# Patient Record
Sex: MALE | URBAN - METROPOLITAN AREA
[De-identification: names, ages, dates, MRNs, and addresses within clinical notes are randomized per-mention and may not be internally consistent; named-entity substitution may affect disease eponyms.]

---

## 2020-06-21 ENCOUNTER — APPOINTMENT (OUTPATIENT)
Dept: GENERAL RADIOLOGY | Age: 60
DRG: 871 | End: 2020-06-21
Payer: MEDICARE

## 2020-06-21 ENCOUNTER — HOSPITAL ENCOUNTER (INPATIENT)
Age: 60
LOS: 10 days | Discharge: NURSING, REHAB OR LONG-TERM CARE | DRG: 871 | End: 2020-07-01
Attending: EMERGENCY MEDICINE | Admitting: INTERNAL MEDICINE
Payer: MEDICARE

## 2020-06-21 PROBLEM — R65.20 SEVERE SEPSIS (HCC): Status: ACTIVE | Noted: 2020-06-21

## 2020-06-21 PROBLEM — A41.9 SEVERE SEPSIS (HCC): Status: ACTIVE | Noted: 2020-06-21

## 2020-06-21 LAB
A/G RATIO: 1.5 (ref 1.1–2.2)
ALBUMIN SERPL-MCNC: 3.5 G/DL (ref 3.4–5)
ALP BLD-CCNC: 73 U/L (ref 40–129)
ALT SERPL-CCNC: 44 U/L (ref 10–40)
AMORPHOUS: ABNORMAL /HPF
AMORPHOUS: ABNORMAL /HPF
ANION GAP SERPL CALCULATED.3IONS-SCNC: 12 MMOL/L (ref 3–16)
ANISOCYTOSIS: ABNORMAL
APTT: 41.4 SEC (ref 24.2–36.2)
AST SERPL-CCNC: 72 U/L (ref 15–37)
BACTERIA: ABNORMAL /HPF
BACTERIA: ABNORMAL /HPF
BANDED NEUTROPHILS RELATIVE PERCENT: 7 % (ref 0–7)
BASE EXCESS ARTERIAL: 0.2 MMOL/L (ref -3–3)
BASE EXCESS VENOUS: 2.2 MMOL/L (ref -2–3)
BASOPHILS ABSOLUTE: 0 K/UL (ref 0–0.2)
BASOPHILS RELATIVE PERCENT: 0 %
BILIRUB SERPL-MCNC: 1.7 MG/DL (ref 0–1)
BILIRUBIN URINE: ABNORMAL
BILIRUBIN URINE: NEGATIVE
BLOOD, URINE: ABNORMAL
BLOOD, URINE: ABNORMAL
BUN BLDV-MCNC: 32 MG/DL (ref 7–20)
CALCIUM SERPL-MCNC: 9.1 MG/DL (ref 8.3–10.6)
CARBOXYHEMOGLOBIN ARTERIAL: 4.1 % (ref 0–1.5)
CARBOXYHEMOGLOBIN: 4.9 % (ref 0–1.5)
CHLORIDE BLD-SCNC: 100 MMOL/L (ref 99–110)
CLARITY: ABNORMAL
CLARITY: CLEAR
CO2: 24 MMOL/L (ref 21–32)
COLOR: YELLOW
COLOR: YELLOW
CREAT SERPL-MCNC: 1.3 MG/DL (ref 0.9–1.3)
D DIMER: <200 NG/ML DDU (ref 0–229)
EOSINOPHILS ABSOLUTE: 0 K/UL (ref 0–0.6)
EOSINOPHILS RELATIVE PERCENT: 0 %
EPITHELIAL CELLS, UA: ABNORMAL /HPF (ref 0–5)
EPITHELIAL CELLS, UA: ABNORMAL /HPF (ref 0–5)
GFR AFRICAN AMERICAN: >60
GFR NON-AFRICAN AMERICAN: 57
GLOBULIN: 2.4 G/DL
GLUCOSE BLD-MCNC: 149 MG/DL (ref 70–99)
GLUCOSE URINE: NEGATIVE MG/DL
GLUCOSE URINE: NEGATIVE MG/DL
HCO3 ARTERIAL: 24 MMOL/L (ref 21–29)
HCO3 VENOUS: 25.6 MMOL/L (ref 24–28)
HCT VFR BLD CALC: 29.2 % (ref 40.5–52.5)
HEMOGLOBIN, ART, EXTENDED: 7.6 G/DL
HEMOGLOBIN, VEN, REDUCED: 4.8 %
HEMOGLOBIN: 10 G/DL (ref 13.5–17.5)
INR BLD: 3.44 (ref 0.86–1.14)
KETONES, URINE: NEGATIVE MG/DL
KETONES, URINE: NEGATIVE MG/DL
LACTIC ACID: 2.7 MMOL/L (ref 0.4–2)
LACTIC ACID: 4.3 MMOL/L (ref 0.4–2)
LEUKOCYTE ESTERASE, URINE: ABNORMAL
LEUKOCYTE ESTERASE, URINE: NEGATIVE
LYMPHOCYTES ABSOLUTE: 0.3 K/UL (ref 1–5.1)
LYMPHOCYTES RELATIVE PERCENT: 3 %
MCH RBC QN AUTO: 32.1 PG (ref 26–34)
MCHC RBC AUTO-ENTMCNC: 34.3 G/DL (ref 31–36)
MCV RBC AUTO: 93.4 FL (ref 80–100)
METAMYELOCYTES RELATIVE PERCENT: 6 %
METHEMOGLOBIN ARTERIAL: 0.5 % (ref 0–1.4)
METHEMOGLOBIN VENOUS: 0.2 % (ref 0–1.5)
MICROCYTES: ABNORMAL
MICROSCOPIC EXAMINATION: YES
MICROSCOPIC EXAMINATION: YES
MONOCYTES ABSOLUTE: 0.2 K/UL (ref 0–1.3)
MONOCYTES RELATIVE PERCENT: 2 %
MUCUS: ABNORMAL /LPF
NEUTROPHILS ABSOLUTE: 8.6 K/UL (ref 1.7–7.7)
NEUTROPHILS RELATIVE PERCENT: 82 %
NITRITE, URINE: NEGATIVE
NITRITE, URINE: NEGATIVE
O2 SAT, ARTERIAL: 95 % (ref 93–100)
O2 SAT, VEN: 95 %
PCO2 ARTERIAL: 35.5 MMHG (ref 35–45)
PCO2, VEN: 37.1 MMHG (ref 41–51)
PDW BLD-RTO: 21.7 % (ref 12.4–15.4)
PH ARTERIAL: 7.44 (ref 7.35–7.45)
PH UA: 6 (ref 5–8)
PH UA: 7 (ref 5–8)
PH VENOUS: 7.45 (ref 7.35–7.45)
PLATELET # BLD: 263 K/UL (ref 135–450)
PMV BLD AUTO: 7.7 FL (ref 5–10.5)
PO2 ARTERIAL: 69.6 MMHG (ref 75–108)
PO2, VEN: 68.7 MMHG (ref 25–40)
POLYCHROMASIA: ABNORMAL
POTASSIUM REFLEX MAGNESIUM: 4.1 MMOL/L (ref 3.5–5.1)
PRO-BNP: 4973 PG/ML (ref 0–124)
PROCALCITONIN: 35.11 NG/ML (ref 0–0.15)
PROTEIN UA: 100 MG/DL
PROTEIN UA: 30 MG/DL
PROTHROMBIN TIME: 40.4 SEC (ref 10–13.2)
RBC # BLD: 3.12 M/UL (ref 4.2–5.9)
RBC UA: ABNORMAL /HPF (ref 0–4)
RBC UA: ABNORMAL /HPF (ref 0–4)
SARS-COV-2, NAAT: NOT DETECTED
SODIUM BLD-SCNC: 136 MMOL/L (ref 136–145)
SPECIFIC GRAVITY UA: 1.01 (ref 1–1.03)
SPECIFIC GRAVITY UA: 1.02 (ref 1–1.03)
TCO2 ARTERIAL: 25 MMOL/L
TCO2 CALC VENOUS: 27 MMOL/L
TOTAL PROTEIN: 5.9 G/DL (ref 6.4–8.2)
TROPONIN: 0.05 NG/ML
TROPONIN: 0.17 NG/ML
URINE TYPE: ABNORMAL
URINE TYPE: ABNORMAL
UROBILINOGEN, URINE: 0.2 E.U./DL
UROBILINOGEN, URINE: 1 E.U./DL
WBC # BLD: 9.1 K/UL (ref 4–11)
WBC UA: ABNORMAL /HPF (ref 0–5)
WBC UA: ABNORMAL /HPF (ref 0–5)

## 2020-06-21 PROCEDURE — 80053 COMPREHEN METABOLIC PANEL: CPT

## 2020-06-21 PROCEDURE — 84443 ASSAY THYROID STIM HORMONE: CPT

## 2020-06-21 PROCEDURE — 99284 EMERGENCY DEPT VISIT MOD MDM: CPT

## 2020-06-21 PROCEDURE — 87040 BLOOD CULTURE FOR BACTERIA: CPT

## 2020-06-21 PROCEDURE — 93005 ELECTROCARDIOGRAM TRACING: CPT | Performed by: EMERGENCY MEDICINE

## 2020-06-21 PROCEDURE — 85730 THROMBOPLASTIN TIME PARTIAL: CPT

## 2020-06-21 PROCEDURE — 71045 X-RAY EXAM CHEST 1 VIEW: CPT

## 2020-06-21 PROCEDURE — U0003 INFECTIOUS AGENT DETECTION BY NUCLEIC ACID (DNA OR RNA); SEVERE ACUTE RESPIRATORY SYNDROME CORONAVIRUS 2 (SARS-COV-2) (CORONAVIRUS DISEASE [COVID-19]), AMPLIFIED PROBE TECHNIQUE, MAKING USE OF HIGH THROUGHPUT TECHNOLOGIES AS DESCRIBED BY CMS-2020-01-R: HCPCS

## 2020-06-21 PROCEDURE — 36415 COLL VENOUS BLD VENIPUNCTURE: CPT

## 2020-06-21 PROCEDURE — 84145 PROCALCITONIN (PCT): CPT

## 2020-06-21 PROCEDURE — 6370000000 HC RX 637 (ALT 250 FOR IP): Performed by: STUDENT IN AN ORGANIZED HEALTH CARE EDUCATION/TRAINING PROGRAM

## 2020-06-21 PROCEDURE — 96367 TX/PROPH/DG ADDL SEQ IV INF: CPT

## 2020-06-21 PROCEDURE — 02HV33Z INSERTION OF INFUSION DEVICE INTO SUPERIOR VENA CAVA, PERCUTANEOUS APPROACH: ICD-10-PCS | Performed by: RADIOLOGY

## 2020-06-21 PROCEDURE — 87086 URINE CULTURE/COLONY COUNT: CPT

## 2020-06-21 PROCEDURE — 2500000003 HC RX 250 WO HCPCS: Performed by: STUDENT IN AN ORGANIZED HEALTH CARE EDUCATION/TRAINING PROGRAM

## 2020-06-21 PROCEDURE — 2580000003 HC RX 258: Performed by: STUDENT IN AN ORGANIZED HEALTH CARE EDUCATION/TRAINING PROGRAM

## 2020-06-21 PROCEDURE — 85025 COMPLETE CBC W/AUTO DIFF WBC: CPT

## 2020-06-21 PROCEDURE — 82728 ASSAY OF FERRITIN: CPT

## 2020-06-21 PROCEDURE — 84484 ASSAY OF TROPONIN QUANT: CPT

## 2020-06-21 PROCEDURE — 87186 SC STD MICRODIL/AGAR DIL: CPT

## 2020-06-21 PROCEDURE — 85610 PROTHROMBIN TIME: CPT

## 2020-06-21 PROCEDURE — 87150 DNA/RNA AMPLIFIED PROBE: CPT

## 2020-06-21 PROCEDURE — 82803 BLOOD GASES ANY COMBINATION: CPT

## 2020-06-21 PROCEDURE — 83880 ASSAY OF NATRIURETIC PEPTIDE: CPT

## 2020-06-21 PROCEDURE — 6370000000 HC RX 637 (ALT 250 FOR IP): Performed by: EMERGENCY MEDICINE

## 2020-06-21 PROCEDURE — 6360000002 HC RX W HCPCS: Performed by: EMERGENCY MEDICINE

## 2020-06-21 PROCEDURE — 96375 TX/PRO/DX INJ NEW DRUG ADDON: CPT

## 2020-06-21 PROCEDURE — 94761 N-INVAS EAR/PLS OXIMETRY MLT: CPT

## 2020-06-21 PROCEDURE — 81001 URINALYSIS AUTO W/SCOPE: CPT

## 2020-06-21 PROCEDURE — 85379 FIBRIN DEGRADATION QUANT: CPT

## 2020-06-21 PROCEDURE — 2500000003 HC RX 250 WO HCPCS: Performed by: EMERGENCY MEDICINE

## 2020-06-21 PROCEDURE — 86140 C-REACTIVE PROTEIN: CPT

## 2020-06-21 PROCEDURE — 96365 THER/PROPH/DIAG IV INF INIT: CPT

## 2020-06-21 PROCEDURE — 2580000003 HC RX 258: Performed by: EMERGENCY MEDICINE

## 2020-06-21 PROCEDURE — 83615 LACTATE (LD) (LDH) ENZYME: CPT

## 2020-06-21 PROCEDURE — U0002 COVID-19 LAB TEST NON-CDC: HCPCS

## 2020-06-21 PROCEDURE — 2700000000 HC OXYGEN THERAPY PER DAY

## 2020-06-21 PROCEDURE — 83605 ASSAY OF LACTIC ACID: CPT

## 2020-06-21 PROCEDURE — 1200000000 HC SEMI PRIVATE

## 2020-06-21 RX ORDER — TAMSULOSIN HYDROCHLORIDE 0.4 MG/1
CAPSULE ORAL
COMMUNITY
Start: 2020-04-22

## 2020-06-21 RX ORDER — POLYETHYLENE GLYCOL 3350 17 G/17G
17 POWDER, FOR SOLUTION ORAL DAILY
Status: DISCONTINUED | OUTPATIENT
Start: 2020-06-22 | End: 2020-07-01 | Stop reason: HOSPADM

## 2020-06-21 RX ORDER — 0.9 % SODIUM CHLORIDE 0.9 %
500 INTRAVENOUS SOLUTION INTRAVENOUS ONCE
Status: COMPLETED | OUTPATIENT
Start: 2020-06-21 | End: 2020-06-22

## 2020-06-21 RX ORDER — ATORVASTATIN CALCIUM 40 MG/1
40 TABLET, FILM COATED ORAL DAILY
COMMUNITY
Start: 2019-02-14

## 2020-06-21 RX ORDER — SODIUM CHLORIDE 0.9 % (FLUSH) 0.9 %
10 SYRINGE (ML) INJECTION EVERY 12 HOURS SCHEDULED
Status: DISCONTINUED | OUTPATIENT
Start: 2020-06-21 | End: 2020-06-26 | Stop reason: SDUPTHER

## 2020-06-21 RX ORDER — TRAMADOL HYDROCHLORIDE 50 MG/1
50 TABLET ORAL EVERY 6 HOURS PRN
Status: ON HOLD | COMMUNITY
End: 2020-07-01 | Stop reason: HOSPADM

## 2020-06-21 RX ORDER — SODIUM CHLORIDE, SODIUM LACTATE, POTASSIUM CHLORIDE, CALCIUM CHLORIDE 600; 310; 30; 20 MG/100ML; MG/100ML; MG/100ML; MG/100ML
1000 INJECTION, SOLUTION INTRAVENOUS ONCE
Status: COMPLETED | OUTPATIENT
Start: 2020-06-21 | End: 2020-06-21

## 2020-06-21 RX ORDER — ONDANSETRON HYDROCHLORIDE 4 MG/5ML
8 SOLUTION ORAL EVERY 8 HOURS PRN
COMMUNITY
Start: 2020-06-03 | End: 2020-09-01

## 2020-06-21 RX ORDER — ASPIRIN 81 MG/1
81 TABLET ORAL DAILY
Status: DISCONTINUED | OUTPATIENT
Start: 2020-06-22 | End: 2020-07-01 | Stop reason: HOSPADM

## 2020-06-21 RX ORDER — ATORVASTATIN CALCIUM 40 MG/1
40 TABLET, FILM COATED ORAL DAILY
Status: DISCONTINUED | OUTPATIENT
Start: 2020-06-22 | End: 2020-07-01 | Stop reason: HOSPADM

## 2020-06-21 RX ORDER — TRAMADOL HYDROCHLORIDE 50 MG/1
50 TABLET ORAL EVERY 6 HOURS PRN
Status: DISCONTINUED | OUTPATIENT
Start: 2020-06-21 | End: 2020-07-01 | Stop reason: HOSPADM

## 2020-06-21 RX ORDER — 0.9 % SODIUM CHLORIDE 0.9 %
1000 INTRAVENOUS SOLUTION INTRAVENOUS ONCE
Status: COMPLETED | OUTPATIENT
Start: 2020-06-21 | End: 2020-06-21

## 2020-06-21 RX ORDER — ATENOLOL 25 MG/1
TABLET ORAL
COMMUNITY
Start: 2016-12-26

## 2020-06-21 RX ORDER — SENNA AND DOCUSATE SODIUM 50; 8.6 MG/1; MG/1
1 TABLET, FILM COATED ORAL 2 TIMES DAILY
Status: DISCONTINUED | OUTPATIENT
Start: 2020-06-21 | End: 2020-07-01 | Stop reason: HOSPADM

## 2020-06-21 RX ORDER — SODIUM CHLORIDE 0.9 % (FLUSH) 0.9 %
10 SYRINGE (ML) INJECTION PRN
Status: DISCONTINUED | OUTPATIENT
Start: 2020-06-21 | End: 2020-06-26 | Stop reason: SDUPTHER

## 2020-06-21 RX ORDER — POTASSIUM CHLORIDE 7.45 MG/ML
10 INJECTION INTRAVENOUS PRN
Status: DISCONTINUED | OUTPATIENT
Start: 2020-06-21 | End: 2020-06-24

## 2020-06-21 RX ORDER — WARFARIN SODIUM 1 MG/1
11 TABLET ORAL DAILY
COMMUNITY

## 2020-06-21 RX ORDER — ONDANSETRON 2 MG/ML
4 INJECTION INTRAMUSCULAR; INTRAVENOUS EVERY 6 HOURS PRN
Status: DISCONTINUED | OUTPATIENT
Start: 2020-06-21 | End: 2020-07-01 | Stop reason: HOSPADM

## 2020-06-21 RX ORDER — OXYBUTYNIN CHLORIDE 5 MG/1
10 TABLET, EXTENDED RELEASE ORAL DAILY
Status: DISCONTINUED | OUTPATIENT
Start: 2020-06-22 | End: 2020-06-23

## 2020-06-21 RX ORDER — SODIUM CHLORIDE 9 MG/ML
INJECTION, SOLUTION INTRAVENOUS CONTINUOUS
Status: DISCONTINUED | OUTPATIENT
Start: 2020-06-21 | End: 2020-06-25

## 2020-06-21 RX ORDER — ACETAMINOPHEN 325 MG/1
650 TABLET ORAL EVERY 6 HOURS PRN
Status: DISCONTINUED | OUTPATIENT
Start: 2020-06-21 | End: 2020-07-01 | Stop reason: HOSPADM

## 2020-06-21 RX ORDER — SERTRALINE HYDROCHLORIDE 100 MG/1
100 TABLET, FILM COATED ORAL DAILY
Status: DISCONTINUED | OUTPATIENT
Start: 2020-06-22 | End: 2020-07-01 | Stop reason: HOSPADM

## 2020-06-21 RX ORDER — MAGNESIUM SULFATE IN WATER 40 MG/ML
2 INJECTION, SOLUTION INTRAVENOUS PRN
Status: DISCONTINUED | OUTPATIENT
Start: 2020-06-21 | End: 2020-07-01 | Stop reason: HOSPADM

## 2020-06-21 RX ORDER — OXYBUTYNIN CHLORIDE 10 MG/1
10 TABLET, EXTENDED RELEASE ORAL DAILY
COMMUNITY
Start: 2019-03-06

## 2020-06-21 RX ORDER — LANOLIN ALCOHOL/MO/W.PET/CERES
3 CREAM (GRAM) TOPICAL NIGHTLY PRN
COMMUNITY

## 2020-06-21 RX ORDER — ACETAMINOPHEN 160 MG/5ML
650 SOLUTION ORAL ONCE
Status: DISCONTINUED | OUTPATIENT
Start: 2020-06-21 | End: 2020-06-21

## 2020-06-21 RX ORDER — PANTOPRAZOLE SODIUM 40 MG/1
40 TABLET, DELAYED RELEASE ORAL DAILY
COMMUNITY
Start: 2020-05-30

## 2020-06-21 RX ORDER — ACETAMINOPHEN 650 MG/1
650 SUPPOSITORY RECTAL EVERY 6 HOURS PRN
Status: DISCONTINUED | OUTPATIENT
Start: 2020-06-21 | End: 2020-07-01 | Stop reason: HOSPADM

## 2020-06-21 RX ORDER — WARFARIN SODIUM 5 MG/1
TABLET ORAL
Status: ON HOLD | COMMUNITY
Start: 2020-05-29 | End: 2020-06-22 | Stop reason: CLARIF

## 2020-06-21 RX ORDER — TAMSULOSIN HYDROCHLORIDE 0.4 MG/1
0.4 CAPSULE ORAL DAILY
Status: DISCONTINUED | OUTPATIENT
Start: 2020-06-22 | End: 2020-07-01 | Stop reason: HOSPADM

## 2020-06-21 RX ORDER — SERTRALINE HYDROCHLORIDE 100 MG/1
TABLET, FILM COATED ORAL
COMMUNITY
Start: 2019-02-08

## 2020-06-21 RX ORDER — ACETAMINOPHEN 160 MG/5ML
650 SOLUTION ORAL ONCE
Status: COMPLETED | OUTPATIENT
Start: 2020-06-21 | End: 2020-06-21

## 2020-06-21 RX ADMIN — SODIUM CHLORIDE 1000 ML: 9 INJECTION, SOLUTION INTRAVENOUS at 20:23

## 2020-06-21 RX ADMIN — SODIUM CHLORIDE, POTASSIUM CHLORIDE, SODIUM LACTATE AND CALCIUM CHLORIDE 1000 ML: 600; 310; 30; 20 INJECTION, SOLUTION INTRAVENOUS at 15:27

## 2020-06-21 RX ADMIN — Medication 10 ML: at 23:16

## 2020-06-21 RX ADMIN — ACETAMINOPHEN 650 MG: 325 TABLET ORAL at 23:17

## 2020-06-21 RX ADMIN — NOREPINEPHRINE BITARTRATE 5 MCG/MIN: 1 INJECTION, SOLUTION, CONCENTRATE INTRAVENOUS at 17:45

## 2020-06-21 RX ADMIN — DOCUSATE SODIUM 50 MG AND SENNOSIDES 8.6 MG 1 TABLET: 8.6; 5 TABLET, FILM COATED ORAL at 23:16

## 2020-06-21 RX ADMIN — ACETAMINOPHEN 650 MG: 650 SOLUTION ORAL at 15:27

## 2020-06-21 RX ADMIN — SODIUM CHLORIDE 500 ML: 9 INJECTION, SOLUTION INTRAVENOUS at 23:15

## 2020-06-21 RX ADMIN — SODIUM CHLORIDE, SODIUM LACTATE, POTASSIUM CHLORIDE, AND CALCIUM CHLORIDE 1000 ML: .6; .31; .03; .02 INJECTION, SOLUTION INTRAVENOUS at 17:16

## 2020-06-21 RX ADMIN — Medication 20 MG: at 23:16

## 2020-06-21 RX ADMIN — VANCOMYCIN HYDROCHLORIDE 1500 MG: 10 INJECTION, POWDER, LYOPHILIZED, FOR SOLUTION INTRAVENOUS at 17:17

## 2020-06-21 RX ADMIN — CEFEPIME 2 G: 2 INJECTION, POWDER, FOR SOLUTION INTRAMUSCULAR; INTRAVENOUS at 16:30

## 2020-06-21 ASSESSMENT — PAIN SCALES - GENERAL
PAINLEVEL_OUTOF10: 0
PAINLEVEL_OUTOF10: 0

## 2020-06-21 NOTE — ED NOTES
Bed: 1TR-01  Expected date:   Expected time:   Means of arrival:   Comments:  Sarah Vargas RN  06/21/20 [id number]

## 2020-06-21 NOTE — ED NOTES
Pt temp down to 102.9 Core. C/o of feeling cold. Ice bags removed, and dry linens placed.       Milton Linder RN  06/21/20 7144

## 2020-06-21 NOTE — ED NOTES
Pt brought to ED by EMS from [facility] for AMS and fever of 104. On arrival to ED, pt 88% on 5L NC. Placed on 10 L NC high flow and increased to 96%. Pt opening eyes, with no verbal response. Rectal temp 106. Pt placed on ice packs and stripped to gown only. Temp Alejandro placed. On cardiac monitor, EKG completed.       Vinny Laboy RN  06/21/20 7742

## 2020-06-21 NOTE — H&P
Internal Medicine HISTORY AND PHYSICAL       Hospital Day: 1  ICU Day:  1                                                        Code: FULL CODE  Admit Date: 6/21/2020  PCP: No primary care provider on file. CC: Hypotension, Fever    HISTORY OF PRESENT ILLNESS:    Mr. 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 He presents from his SNF ([facility]) with fever to 106, hypotension to MAP 50-55, mild hypoxia on room air. He is minimally responsive to sepsis fluid bolus. He is very hard of hearing, but can indicate that he feels his normal self and is unaware of any acute issues. He denies all problems except for 7 days of constipation. He prefers to be FULL CODE. In the ED, his CXR shows BL diffuse airspace disease. There is concern for COVID, however initial rapid test was negative. Given our high suspicion we will retest. He will be admitted to the  for pressors. Denies any fevers, chills, chest pain, palpitations, leg swelling, cough, shortness of breath, difficulty breathing, wheezing, abdominal pain, nausea, vomiting, diarrhea.     PAST HISTORY:     Past Medical History:   Diagnosis Date    Adult failure to thrive     Dysphagia, unspecified     Encounter for attention to gastrostomy ([facility] [address]     GERD without esophagitis     Hyperlipidemia     Hypertension     Legal blindness, as defined in [address] term (current) use of anticoagulants     Malignant neoplasm of oropharynx, unspecified (HCC)     Muscle weakness (generalized)     Overactive bladder     Presence of prosthetic heart valve     Secondary and unspecified malignant neoplasm of lymph nodes of head, face and neck (HCC)     Unspecified severe protein-calorie malnutrition (HCC)     Unsteadiness on feet No past surgical history on file. SocialHistory:   The patient lives at in a nursing home [address]. Alcohol:  has no history on file for alcohol. Illicit drugs: none. Tobacco:   has no history on file for tobacco.    Family History:  No family history on file. MEDICATIONS:   Prior To Admission:  Not in a hospital admission. No current facility-administered medications on file prior to encounter. Current Outpatient Medications on File Prior to Encounter   Medication Sig Dispense Refill    Aspirin Buf,CaCarb-MgCarb-MgO, 81 MG TABS Take 81 mg by mouth daily      atenolol (TENORMIN) 25 MG tablet TAKE 1 TABLET EVERY DAY      atorvastatin (LIPITOR) 40 MG tablet Take 40 mg by mouth daily      ondansetron (ZOFRAN) 4 MG/5ML solution Take 8 mg by mouth every 8 hours as needed      oxybutynin (DITROPAN-XL) 10 MG extended release tablet Take 10 mg by mouth daily      pantoprazole (PROTONIX) 40 MG tablet Take 40 mg by mouth daily      sertraline (ZOLOFT) 100 MG tablet TAKE 1 TABLET EVERY DAY      tamsulosin (FLOMAX) 0.4 MG capsule TAKE 1 CAPSULE BY MOUTH NIGHTLY AT BEDTIME.  traMADol (ULTRAM) 50 MG tablet Crush and place through PEG tube one tab every 6 hours as needed for pain.  warfarin (COUMADIN) 5 MG tablet Take 11mg daily (6mg+5mg tabs)      warfarin (COUMADIN) 6 MG tablet Take with 5mg to make 11mg daily           Scheduled Meds:   sodium chloride  1,000 mL Intravenous Once    [START ON 6/22/2020] cefepime  2 g Intravenous Q12H    [START ON 6/22/2020] vancomycin  1,000 mg Intravenous Q18H      Continuous Infusions:   norepinephrine 10 mcg/min (06/21/20 1806)     PRN Meds:    Allergies: No Known Allergies    REVIEW OF SYSTEMS:   History obtained from chart review and the patient    ROS  A 10-component Review of Systems was completed and negative except that which is stated in the Interval History.     PHYSICAL EXAM:   Vitals: BP (!) 86/45   Pulse 98   Temp 102 °F (38.9 °C) (Core)   Resp 22   Ht 5' 10\" (1.778 m)   Wt 138 lb (62.6 kg)   SpO2 99%   BMI 19.80 kg/m²     I/O:  No intake or output data in the 24 hours ending 06/21/20 1940  No intake/output data recorded. No intake/output data recorded. Physical Examination:   Physical Exam  Vitals signs and nursing note reviewed. Constitutional:       General: He is not in acute distress. Appearance: He is ill-appearing. Comments: Cachectic, emaciated, pale   HENT:      Head: Normocephalic. Ears:      Comments: Hearing aids BL     Mouth/Throat:      Mouth: Mucous membranes are dry. Comments: Xerostomia, halitosis, dentition caked with old debris. Scabbed cutaneous lesions about laryngeal skin from radiation. Eyes:      General: No scleral icterus. Pupils: Pupils are equal, round, and reactive to light. Comments: Unable to fully assess visual fields    Neck:      Musculoskeletal: Normal range of motion and neck supple. Comments: No JVD  Cardiovascular:      Rate and Rhythm: Regular rhythm. Tachycardia present. Pulses: Normal pulses. Heart sounds: Murmur present. Pulmonary:      Effort: Pulmonary effort is normal. No respiratory distress. Breath sounds: Wheezing present. Abdominal:      General: Abdomen is flat. There is no distension. Palpations: Abdomen is soft. Tenderness: There is no abdominal tenderness. There is no guarding. Comments: Scaphoid, PEG tube in place, nominal   Musculoskeletal:         General: No swelling. Right lower leg: No edema. Left lower leg: No edema. Skin:     General: Skin is warm and dry. Capillary Refill: Capillary refill takes 2 to 3 seconds. Coloration: Skin is pale. Findings: Bruising (belly around injection sites) present. No rash. Neurological:      Mental Status: He is alert and oriented to person, place, and time. Sensory: No sensory deficit. Motor: Weakness (left hemiparesis) present. Psychiatric:         Mood and Affect: Mood normal.       Access:   -Peripheral Access Day#:1  -Central Access Day #: PICC 2 lumen R Basilic PoA                                 -Arterial line Day#:                                  Alejandro Day#:1  NGT Day#:   PEG tube PoA                              ETT Day#:  Vent Settings:    / / /     No results for input(s): PHART, AQU4YIW, PO2ART in the last 72 hours. DATA:       Labs:  CBC:   Recent Labs     06/21/20  1530   WBC 9.1   HGB 10.0*   HCT 29.2*          BMP:   Recent Labs     06/21/20  1530      K 4.1      CO2 24   BUN 32*   CREATININE 1.3   GLUCOSE 149*     LFT's:   Recent Labs     06/21/20  1530   AST 72*   ALT 44*   BILITOT 1.7*   ALKPHOS 73     Troponin:   Recent Labs     06/21/20  1530   TROPONINI 0.05*     BNP:No results for input(s): BNP in the last 72 hours. ABGs: No results for input(s): PHART, CGE1YMR, PO2ART in the last 72 hours. INR:   Recent Labs     06/21/20  1529   INR 3.44*       U/A:  Recent Labs     06/21/20  1537   COLORU Yellow   PHUR 7.0   WBCUA 3-5   RBCUA 3-4   MUCUS 1+*   BACTERIA 4+*   CLARITYU Clear   SPECGRAV 1.015   LEUKOCYTESUR Negative   UROBILINOGEN 1.0   BILIRUBINUR SMALL*   BLOODU SMALL*   GLUCOSEU Negative   AMORPHOUS 1+       XR CHEST PORTABLE   Final Result      Bilateral airspace disease concerning for pneumonia; Viral pneumonia should be considered. EKG: pending    Echo: 1/2019  Left ventricle: The cavity size was normal. There was mild concentric hypertrophy. Systolic function was normal. The calculated ejection fraction was in the range of 60% to 65%. Wall motion was normal; there were no regional wall motion abnormalities. Doppler parameters are consistent with abnormal left ventricular relaxation (grade 1 diastolic dysfunction). Stroke volume: 104ml. Stroke volume/bsa: 56ml/m^2. - Aortic valve: A mechanical prosthesis was present. There was moderate regurgitation.  Mean gradient (S): 62mm PEG tube feeds  PPX:  Therapeutic heparin gtt  DISPO: ICU    This patient will be staffed and discussed with Stefani Stokes MD.   -----------------------------  Berlinda Homans, PGY-1  6/21/2020  7:40 PM          General Internal Medicine Attending    Chart and data reviewed. Patient seen and examined, case discussed with medical resident. Physical exam repeated. Labs and imaging studies reviewed. Agree with documentation, assessment and plan as outlined above. Severe sepsis with septic shock  Pneumonia, with overall picture highly suspicious for COVID  Acute hypoxic respiratory failure  Immunosuppression sec to recent chemo  Head and neck cancer: stage 4 SCC tongue  S/P mechanical AVR  CAD s/p stent  Acute on chronic CHF with preserved EF        Pxt is very ill with high risk for deterioration including need for mechanical ventilation. Palliative Care consult     Critical care consult. Repeat COVID testing. Rest as per resident's note.         Stefani Stokes MD

## 2020-06-21 NOTE — ED PROVIDER NOTES
[address] ENCOUNTER          ATTENDING PHYSICIAN NOTE       Date of evaluation: 6/21/2020    Chief Complaint     Fever      History of Present Illness     Josy Em is a 64 y.o. male who presents with fever and tachycardia. Patient coming from nursing facility with history of head and neck cancer, currently on chemo and radiation. Per nursing facility, was noted to have high fever and tachycardia today. EMS found patient to be hypoxic to 70s, placed on supplemental oxygen and transferred ported here. Patient's [facility] all through [facility], but he was transported closest hospital due to being unstable. Arrival to ED, patient tachycardic to 140s, hypoxic to mid 80s, tachypneic. Initially not speaking but alert and nodding yes/no to limited questions. Denied chest pain or other areas of pain. Per family, pt also has hx of aortic valve replacement on coumadin. Has PEG tube and R side PICC line place recently. Review of Systems     Review of Systems   Unable to perform ROS: Acuity of condition   Cardiovascular: Negative for chest pain. Past Medical, Surgical, Family, and Social History     He has a past medical history of Adult failure to thrive, Dysphagia, unspecified, Encounter for attention to gastrostomy [facility]), GERD without esophagitis, Hyperlipidemia, Hypertension, Legal blindness, as defined in [place], Long term (current) use of anticoagulants, Malignant neoplasm of oropharynx, unspecified ([address] Muscle weakness (generalized), Overactive bladder, Presence of prosthetic heart valve, Secondary and unspecified malignant neoplasm of lymph nodes of head, face and neck ([address] Unspecified severe protein-calorie malnutrition ([address] and Unsteadiness on feet. He has no past surgical history on file. His family history is not on file.   He     Medications     Previous Medications    ASPIRIN BUF,CACARB-MGCARB-MGO, 81 MG TABS    Take 81 mg by mouth daily    ATENOLOL (TENORMIN) 25 MG TABLET    TAKE 1 TABLET EVERY DAY    ATORVASTATIN (LIPITOR) 40 MG TABLET    Take 40 mg by mouth daily    ONDANSETRON (ZOFRAN) 4 MG/5ML SOLUTION    Take 8 mg by mouth every 8 hours as needed    OXYBUTYNIN (DITROPAN-XL) 10 MG EXTENDED RELEASE TABLET    Take 10 mg by mouth daily    PANTOPRAZOLE (PROTONIX) 40 MG TABLET    Take 40 mg by mouth daily    SERTRALINE (ZOLOFT) 100 MG TABLET    TAKE 1 TABLET EVERY DAY    TAMSULOSIN (FLOMAX) 0.4 MG CAPSULE    TAKE 1 CAPSULE BY MOUTH NIGHTLY AT BEDTIME. TRAMADOL (ULTRAM) 50 MG TABLET    Crush and place through PEG tube one tab every 6 hours as needed for pain. WARFARIN (COUMADIN) 5 MG TABLET    Take 11mg daily (6mg+5mg tabs)    WARFARIN (COUMADIN) 6 MG TABLET    Take with 5mg to make 11mg daily       Allergies     He has No Known Allergies. Physical Exam     INITIAL VITALS: BP: (!) 166/118, Temp: (!) 106 °F (41.1 °C), Pulse: 129, Resp: (!) 47, SpO2: 94 %   Physical Exam  Vitals signs and nursing note reviewed. Constitutional:       Appearance: He is well-developed. He is ill-appearing and diaphoretic. Comments: Thin, malnourished   HENT:      Head: Normocephalic and atraumatic. Right Ear: External ear normal.      Left Ear: External ear normal.      Nose: Nose normal.   Eyes:      Conjunctiva/sclera: Conjunctivae normal.      Pupils: Pupils are equal, round, and reactive to light. Neck:      Musculoskeletal: Normal range of motion and neck supple. Cardiovascular:      Rate and Rhythm: Regular rhythm. Tachycardia present. Heart sounds: Normal heart sounds. No murmur. No friction rub. No gallop. Pulmonary:      Effort: Pulmonary effort is normal. No respiratory distress. Breath sounds: Normal breath sounds. No stridor. No wheezing or rales. Comments: tachypneic  Chest:      Chest wall: No tenderness. Abdominal:      General: There is no distension. Palpations: Abdomen is soft. Tenderness:  There is no abdominal tenderness. There is no guarding. Musculoskeletal: Normal range of motion. General: No tenderness. Arms:    Skin:     General: Skin is warm. Capillary Refill: Capillary refill takes less than 2 seconds. Findings: No erythema or rash. Neurological:      Mental Status: He is alert. Comments: L arm contracture. Moving R arm normally. No new gross focal deficits. Diagnostic Results     EKG   Sinus tach, normal intervals, no ST or T changes to suggest ischemia or infarction      RADIOLOGY:  XR CHEST PORTABLE   Final Result      Bilateral airspace disease concerning for pneumonia; Viral pneumonia should be considered.           LABS:   Results for orders placed or performed during the hospital encounter of 06/21/20   CBC Auto Differential   Result Value Ref Range    WBC 9.1 4.0 - 11.0 K/uL    RBC 3.12 (L) 4.20 - 5.90 M/uL    Hemoglobin 10.0 (L) 13.5 - 17.5 g/dL    Hematocrit 29.2 (L) 40.5 - 52.5 %    MCV 93.4 80.0 - 100.0 fL    MCH 32.1 26.0 - 34.0 pg    MCHC 34.3 31.0 - 36.0 g/dL    RDW 21.7 (H) 12.4 - 15.4 %    Platelets 736 214 - 795 K/uL    MPV 7.7 5.0 - 10.5 fL    Neutrophils % 82.0 %    Lymphocytes % 3.0 %    Monocytes % 2.0 %    Eosinophils % 0.0 %    Basophils % 0.0 %    Neutrophils Absolute 8.6 (H) 1.7 - 7.7 K/uL    Lymphocytes Absolute 0.3 (L) 1.0 - 5.1 K/uL    Monocytes Absolute 0.2 0.0 - 1.3 K/uL    Eosinophils Absolute 0.0 0.0 - 0.6 K/uL    Basophils Absolute 0.0 0.0 - 0.2 K/uL    Bands Relative 7 0 - 7 %    Metamyelocytes Relative 6 (A) %    Anisocytosis 1+ (A)     Microcytes 1+ (A)     Polychromasia 1+ (A)    Comprehensive Metabolic Panel w/ Reflex to MG   Result Value Ref Range    Sodium 136 136 - 145 mmol/L    Potassium reflex Magnesium 4.1 3.5 - 5.1 mmol/L    Chloride 100 99 - 110 mmol/L    CO2 24 21 - 32 mmol/L    Anion Gap 12 3 - 16    Glucose 149 (H) 70 - 99 mg/dL    BUN 32 (H) 7 - 20 mg/dL    CREATININE 1.3 0.9 - 1.3 mg/dL    GFR Non-African American 57 (A) >60    GFR African American >60 >60    Calcium 9.1 8.3 - 10.6 mg/dL    Total Protein 5.9 (L) 6.4 - 8.2 g/dL    Alb 3.5 3.4 - 5.0 g/dL    Albumin/Globulin Ratio 1.5 1.1 - 2.2    Total Bilirubin 1.7 (H) 0.0 - 1.0 mg/dL    Alkaline Phosphatase 73 40 - 129 U/L    ALT 44 (H) 10 - 40 U/L    AST 72 (H) 15 - 37 U/L    Globulin 2.4 g/dL   Troponin   Result Value Ref Range    Troponin 0.05 (H) <0.01 ng/mL   Brain Natriuretic Peptide   Result Value Ref Range    Pro-BNP 4,973 (H) 0 - 124 pg/mL   Lactic Acid, Plasma   Result Value Ref Range    Lactic Acid 2.7 (H) 0.4 - 2.0 mmol/L   Urinalysis, reflex to microscopic   Result Value Ref Range    Color, UA Yellow Straw/Yellow    Clarity, UA Clear Clear    Glucose, Ur Negative Negative mg/dL    Bilirubin Urine SMALL (A) Negative    Ketones, Urine Negative Negative mg/dL    Specific Gravity, UA 1.015 1.005 - 1.030    Blood, Urine SMALL (A) Negative    pH, UA 7.0 5.0 - 8.0    Protein, UA 30 (A) Negative mg/dL    Urobilinogen, Urine 1.0 <2.0 E.U./dL    Nitrite, Urine Negative Negative    Leukocyte Esterase, Urine Negative Negative    Microscopic Examination YES     Urine Type Other    Blood Gas, Venous   Result Value Ref Range    pH, Jack 7.454 (H) 7.350 - 7.450    pCO2, Jack 37.1 (L) 41.0 - 51.0 mmHg    pO2, Jack 68.7 (H) 25.0 - 40.0 mmHg    HCO3, Venous 25.6 24.0 - 28.0 mmol/L    Base Excess, Jack 2.2 -2.0 - 3.0 mmol/L    O2 Sat, Jack 95 Not established %    Carboxyhemoglobin 4.9 (H) 0.0 - 1.5 %    MetHgb, Jack 0.2 0.0 - 1.5 %    TC02 (Calc), Jack 27 mmol/L    Hemoglobin, Jack, Reduced 4.80 %   COVID-19   Result Value Ref Range    SARS-CoV-2, NAAT Not Detected Not Detected   Microscopic Urinalysis   Result Value Ref Range    Mucus, UA 1+ (A) None Seen /LPF    WBC, UA 3-5 0 - 5 /HPF    RBC, UA 3-4 0 - 4 /HPF    Epithelial Cells, UA 0-1 0 - 5 /HPF    Bacteria, UA 4+ (A) None Seen /HPF    Amorphous, UA 1+ /HPF   APTT   Result Value Ref Range    aPTT 41.4 (H) 24.2 - 36.2 sec   Protime-INR Result Value Ref Range    Protime 40.4 (H) 10.0 - 13.2 sec    INR 3.44 (H) 0.86 - 1.14   Lactate, plasma   Result Value Ref Range    Lactic Acid 4.3 (HH) 0.4 - 2.0 mmol/L       ED BEDSIDE ULTRASOUND:  none    RECENT VITALS:  BP: (!) 89/55,Temp: 102 °F (38.9 °C), Pulse: 99, Resp: 30, SpO2: 93 %     Procedures     none    ED Course     Nursing Notes, Past Medical Hx, Past Surgical Hx, Social Hx,Allergies, and Family Hx were reviewed. patient was given the following medications:  Orders Placed This Encounter   Medications    lactated ringers infusion 1,000 mL    DISCONTD: acetaminophen (TYLENOL) 160 MG/5ML solution 650 mg    acetaminophen (TYLENOL) 160 MG/5ML solution 650 mg    cefepime (MAXIPIME) 2 g IVPB minibag    vancomycin (VANCOCIN) 1,500 mg in dextrose 5 % 250 mL IVPB    lactated ringers infusion 1,000 mL    norepinephrine (LEVOPHED) 16 mg in dextrose 5 % 250 mL infusion    DISCONTD: cefepime (MAXIPIME) 2 g IVPB minibag    0.9 % sodium chloride bolus    cefepime (MAXIPIME) 2 g IVPB minibag    vancomycin (VANCOCIN) 1,000 mg in dextrose 5 % 250 mL IVPB       CONSULTS:  IP CONSULT TO CRITICAL CARE  IP CONSULT TO HOSPITALIST  IP CONSULT TO PHARMACY  IP CONSULT TO DIETITIAN  IP CONSULT TO CRITICAL CARE  IP CONSULT TO CRITICAL CARE  IP CONSULT TO PALLIATIVE CARE  PHARMACY TO DOSE WARFARIN    MEDICAL DECISIONMAKING / ASSESSMENT / Arturo Bullock is a 64 y.o. male from NH with hypotension, fever, hypoxia. Hypotensive in ED, transiently improved with IVF. Tachycardic and tachypneic, both improved after IVF and antipyretics. Pt given IVF, tylenol via PEG tube, and ice packs placed. Rectal temp 106. Sats in normal range on nasal cannula. CXR with diffuse hazy opacities, consistent with possible covid. Covered with broad spectrum abx for HCAP as well. Initial rapid covid negative but difficult to obtain oral sample per RN, so was likely inadequate. Will repeat covid with nasopharyngeal swab.  Labs with slightly elevated lactate, trop and bnp, all likely due to sepsis. He does not appear to have fluid overload. After 2L IVF, still episodes of hypotension so levophed started through PICC line. Spoke with pt's daughter Laura Hudson via phone, who is listed POA, as well as pt's sister, Isaiah Love ([phone]) in person. Both say that pt is full code. They were informed of pt's status and admission to ICU. Critical Care:  Due to the immediate potential for life-threatening deterioration due to septic shock, I spent 34 minutes providing critical care. This time excludes time spent performing procedures but includes time spent on direct patient care, history retrieval, review of the chart, and discussions with patient, family, and consultant(s). Clinical Impression     1. Sepsis, due to unspecified organism, unspecified whether acute organ dysfunction present ([address]    2. Pneumonia due to organism        Disposition     PATIENT REFERRED TO:  No follow-up provider specified.     DISCHARGE MEDICATIONS:  New Prescriptions    No medications on file       DISPOSITION Admitted 06/21/2020 06:00:44 PM       Johan Rodarte MD  06/21/20 2027

## 2020-06-21 NOTE — CONSULTS
Clinical Pharmacy Consult Note    Admit date: 6/21/2020    Subjective/Objective:  64 yom with PMHx significant for h/o oropharynx cancer, prosthetic heart valve on warfarin presented from [facility] with fever (Tmax 102.9F in ED), tachycardia and hypotension. Admitted for treatment of sepsis with IV antibiotics. Pharmacy is consulted to dose Vancomycin per Dr. Hernesto Rivera    Pertinent Medications:  Cefepime 2g IV q12h -- day # 1  Vancomycin, pharmacy to dose -- day # 1   1.5g IV x1 (6/21)   1g IV q18h (to begin 6/22)    Recent Labs     06/21/20  1530      K 4.1      CO2 24   BUN 32*   CREATININE 1.3       Estimated Creatinine Clearance: 55 mL/min (based on SCr of 1.3 mg/dL). Recent Labs     06/21/20  1530   WBC 9.1   HGB 10.0*   HCT 29.2*   MCV 93.4          Height:  5' 10\" (177.8 cm)  Weight: 138 lb (62.6 kg)    Micro:  Date Site Micro Susceptibility   6/21 Urine sent    6/21 Blood x2 sent              Assessment/Plan:  1. Sepsis  :  Cefepime + vancomycin day #1  Vancomycin - pharmacy to dose  · Patient received 1.5g IV x1 in ED today. Will start 1g IV q18h to begin ~18h after ED dose administration. Provides ~ 15 mg/kg and is based on a half-life elimination of 14 hours. · Will order trough when appropriate. Target 15-20 mcg/mL. · Renal function will be monitored closely and dosing will be adjusted as appropriate. Cefepime  · 2g IV q24h ordered. Adjusted to 2g IV q12h based on indication/renal function. · Renal function will be monitored and dose adjusted as appropriate. Please call with any questions.   Lauren King, [place], BCPS  Main pharmacy: [id number]  6/21/2020 6:51 PM

## 2020-06-21 NOTE — CONSULTS
ICU HISTORY AND PHYSICAL       Hospital Day: 1  ICU Day:  1                                                        Code: FULL CODE  Admit Date: 6/21/2020  PCP: No primary care provider on file. CC: Hypotension, FEver    HISTORY OF PRESENT ILLNESS:   Mr. 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 He presents from his SNF ([facility]) with fever to 106, hypotension to MAP 50-55, mild hypoxia on room air. He is minimally responsive to sepsis fluid bolus. He is very hard of hearing, but can indicate that he feels his normal self and is unaware of any acute issues. He denies all problems except for 7 days of constipation. He prefers to be FULL CODE. In the ED, his CXR shows BL diffuse airspace disease. There is concern for COVID, however initial rapid test was negative. Given our high suspicion we will retest. He will be admitted to the  for pressors.      Denies any fevers, chills, chest pain, palpitations, leg swelling, cough, shortness of breath, difficulty breathing, wheezing, abdominal pain, nausea, vomiting, diarrhea.     PAST HISTORY:     Past Medical History:   Diagnosis Date    Adult failure to thrive     Dysphagia, unspecified     Encounter for attention to gastrostomy ([facility] [address]     GERD without esophagitis     Hyperlipidemia     Hypertension     Legal blindness, as defined in [address] term (current) use of anticoagulants     Malignant neoplasm of oropharynx, unspecified (HCC)     Muscle weakness (generalized)     Overactive bladder     Presence of prosthetic heart valve     Secondary and unspecified malignant neoplasm of lymph nodes of head, face and neck (HCC)     Unspecified severe protein-calorie malnutrition (HCC)     Unsteadiness on feet        No past surgical history on file. SocialHistory:   [facility]    Alcohol:  has no history on file for alcohol. Illicit drugs: denies  Tobacco:   has no history on file for tobacco.    Family History:  No family history on file. MEDICATIONS:   Prior To Admission:  Not in a hospital admission. No current facility-administered medications on file prior to encounter. Current Outpatient Medications on File Prior to Encounter   Medication Sig Dispense Refill    Aspirin Buf,CaCarb-MgCarb-MgO, 81 MG TABS Take 81 mg by mouth daily      atenolol (TENORMIN) 25 MG tablet TAKE 1 TABLET EVERY DAY      atorvastatin (LIPITOR) 40 MG tablet Take 40 mg by mouth daily      ondansetron (ZOFRAN) 4 MG/5ML solution Take 8 mg by mouth every 8 hours as needed      oxybutynin (DITROPAN-XL) 10 MG extended release tablet Take 10 mg by mouth daily      pantoprazole (PROTONIX) 40 MG tablet Take 40 mg by mouth daily      sertraline (ZOLOFT) 100 MG tablet TAKE 1 TABLET EVERY DAY      tamsulosin (FLOMAX) 0.4 MG capsule TAKE 1 CAPSULE BY MOUTH NIGHTLY AT BEDTIME.  traMADol (ULTRAM) 50 MG tablet Crush and place through PEG tube one tab every 6 hours as needed for pain.  warfarin (COUMADIN) 5 MG tablet Take 11mg daily (6mg+5mg tabs)      warfarin (COUMADIN) 6 MG tablet Take with 5mg to make 11mg daily           Scheduled Meds:   sodium chloride  1,000 mL Intravenous Once    [START ON 6/22/2020] cefepime  2 g Intravenous Q12H    [START ON 6/22/2020] vancomycin  1,000 mg Intravenous Q18H      Continuous Infusions:   norepinephrine 10 mcg/min (06/21/20 1806)     PRN Meds:    Allergies: No Known Allergies    REVIEW OF SYSTEMS:   History obtained from chart review and the patient    ROS  A 10-component Review of Systems was completed and negative except that which is stated in the Interval History.     PHYSICAL EXAM:   Vitals: BP (!) 86/45   Pulse 98   Temp 102 °F (38.9 °C) (Core)   Resp 22   Ht 5' 10\" (1.778 m)   Wt 138 lb (62.6 kg)   SpO2 99%   BMI 19.80 kg/m²     I/O:  No intake or output data in the 24 hours ending 06/21/20 1949  No intake/output data recorded. No intake/output data recorded. Physical Examination:   Physical Exam  Vitals signs and nursing note reviewed. Constitutional:       General: He is not in acute distress. Appearance: He is ill-appearing. Comments: Cachectic, emaciated, pale   HENT:      Head: Normocephalic. Ears:      Comments: Hearing aids BL     Mouth/Throat:      Mouth: Mucous membranes are dry. Comments: Xerostomia, halitosis, dentition caked with old debris. Scabbed cutaneous lesions about laryngeal skin from radiation. Eyes:      General: No scleral icterus. Pupils: Pupils are equal, round, and reactive to light. Comments: Unable to fully assess visual fields    Neck:      Musculoskeletal: Normal range of motion and neck supple. Comments: No JVD  Cardiovascular:      Rate and Rhythm: Regular rhythm. Tachycardia present. Pulses: Normal pulses. Heart sounds: Murmur present. Pulmonary:      Effort: Pulmonary effort is normal. No respiratory distress. Breath sounds: Wheezing present. Abdominal:      General: Abdomen is flat. There is no distension. Palpations: Abdomen is soft. Tenderness: There is no abdominal tenderness. There is no guarding. Comments: Scaphoid, PEG tube in place, nominal   Musculoskeletal:         General: No swelling. Right lower leg: No edema. Left lower leg: No edema. Skin:     General: Skin is warm and dry. Capillary Refill: Capillary refill takes 2 to 3 seconds. Coloration: Skin is pale. Findings: Bruising (belly around injection sites) present. No rash. Neurological:      Mental Status: He is alert and oriented to person, place, and time. Sensory: No sensory deficit. Motor: Weakness (left hemiparesis) present. Psychiatric:         Mood and Affect: Mood normal.         Access:   -Peripheral Access Day#:1  -Central Access Day #: PICC 2 lumen R Basilic PoA                                 -Arterial line Day#:                                  Alejandro Day#:1  NGT Day#:   PEG tube PoA                              ETT Day#:  Vent Settings:    / / /   No results for input(s): PHART, XWT6WTH, PO2ART in the last 72 hours. DATA:       Labs:  CBC:   Recent Labs     06/21/20  1530   WBC 9.1   HGB 10.0*   HCT 29.2*          BMP:   Recent Labs     06/21/20  1530      K 4.1      CO2 24   BUN 32*   CREATININE 1.3   GLUCOSE 149*     LFT's:   Recent Labs     06/21/20  1530   AST 72*   ALT 44*   BILITOT 1.7*   ALKPHOS 73     Troponin:   Recent Labs     06/21/20  1530   TROPONINI 0.05*     BNP:No results for input(s): BNP in the last 72 hours. ABGs: No results for input(s): PHART, CWK3CQA, PO2ART in the last 72 hours. INR:   Recent Labs     06/21/20  1529   INR 3.44*       U/A:  Recent Labs     06/21/20  1537   COLORU Yellow   PHUR 7.0   WBCUA 3-5   RBCUA 3-4   MUCUS 1+*   BACTERIA 4+*   CLARITYU Clear   SPECGRAV 1.015   LEUKOCYTESUR Negative   UROBILINOGEN 1.0   BILIRUBINUR SMALL*   BLOODU SMALL*   GLUCOSEU Negative   AMORPHOUS 1+       XR CHEST PORTABLE   Final Result      Bilateral airspace disease concerning for pneumonia; Viral pneumonia should be considered. EKG: pending     Echo: 1/2019  Left ventricle: The cavity size was normal. There was mild concentric hypertrophy. Systolic function was normal. The calculated ejection fraction was in the range of 60% to 65%. Wall motion was normal; there were no regional wall motion abnormalities. Doppler parameters are consistent with abnormal left ventricular relaxation (grade 1 diastolic dysfunction). Stroke volume: 104ml. Stroke volume/bsa: 56ml/m^2. - Aortic valve: A mechanical prosthesis was present. There was moderate regurgitation.  Mean gradient (S): 62mm consulted for PEG tube feeds  PPX:  Therapeutic heparin gtt  DISPO: ICU    This patient will be staffed and discussed with Fady Tomas MD.   -----------------------------  Janna Epperson, PGY-1  6/21/2020  7:49 PM    Patient seen, examined and discussed with the resident and I agree with the assessment and plan. Briefly, this is a 64 y.o. male with head and neck cancer, history of CVA with left sided weakness, presented with septic shock and fever. On levo for pressors  Cefepime and vancomycin. Pneumonia is most likely source of infection, but will culture the PICC he had in place from another hospital.  Drop in h/h today but without overt bleeding. Repeat COVID test is pending. If levo dose continues to come down we can start feeds. Critical care time spent reviewing labs/films, examining patient, collaborating with other physicians but excluding procedures for life threatening organ failure is 33 minutes.       Rober Hernadez MD

## 2020-06-22 ENCOUNTER — APPOINTMENT (OUTPATIENT)
Dept: CT IMAGING | Age: 60
DRG: 871 | End: 2020-06-22
Payer: MEDICARE

## 2020-06-22 LAB
ABO/RH: NORMAL
ALBUMIN SERPL-MCNC: 2.6 G/DL (ref 3.4–5)
ALP BLD-CCNC: 48 U/L (ref 40–129)
ALT SERPL-CCNC: 35 U/L (ref 10–40)
ANION GAP SERPL CALCULATED.3IONS-SCNC: 10 MMOL/L (ref 3–16)
ANTIBODY SCREEN: NORMAL
AST SERPL-CCNC: 52 U/L (ref 15–37)
BASOPHILS ABSOLUTE: 0 K/UL (ref 0–0.2)
BASOPHILS ABSOLUTE: 0 K/UL (ref 0–0.2)
BASOPHILS RELATIVE PERCENT: 0.2 %
BASOPHILS RELATIVE PERCENT: 0.4 %
BILIRUB SERPL-MCNC: 1.2 MG/DL (ref 0–1)
BILIRUBIN DIRECT: 0.4 MG/DL (ref 0–0.3)
BILIRUBIN, INDIRECT: 0.8 MG/DL (ref 0–1)
BLOOD BANK DISPENSE STATUS: NORMAL
BLOOD BANK PRODUCT CODE: NORMAL
BLOOD SMEAR REVIEW: NORMAL
BPU ID: NORMAL
BUN BLDV-MCNC: 35 MG/DL (ref 7–20)
C-REACTIVE PROTEIN: 231.1 MG/L (ref 0–5.1)
CALCIUM SERPL-MCNC: 7.8 MG/DL (ref 8.3–10.6)
CHLORIDE BLD-SCNC: 104 MMOL/L (ref 99–110)
CO2: 23 MMOL/L (ref 21–32)
CREAT SERPL-MCNC: 0.8 MG/DL (ref 0.9–1.3)
DESCRIPTION BLOOD BANK: NORMAL
EKG ATRIAL RATE: 127 BPM
EKG DIAGNOSIS: NORMAL
EKG P AXIS: -16 DEGREES
EKG P-R INTERVAL: 124 MS
EKG Q-T INTERVAL: 302 MS
EKG QRS DURATION: 92 MS
EKG QTC CALCULATION (BAZETT): 438 MS
EKG R AXIS: -71 DEGREES
EKG T AXIS: 62 DEGREES
EKG VENTRICULAR RATE: 127 BPM
EOSINOPHILS ABSOLUTE: 0 K/UL (ref 0–0.6)
EOSINOPHILS ABSOLUTE: 0 K/UL (ref 0–0.6)
EOSINOPHILS RELATIVE PERCENT: 0.1 %
EOSINOPHILS RELATIVE PERCENT: 0.1 %
FERRITIN: 482.3 NG/ML (ref 30–400)
FIBRINOGEN: 534 MG/DL (ref 200–397)
GFR AFRICAN AMERICAN: >60
GFR NON-AFRICAN AMERICAN: >60
GLUCOSE BLD-MCNC: 193 MG/DL (ref 70–99)
HAPTOGLOBIN: <10 MG/DL (ref 30–200)
HCT VFR BLD CALC: 18.9 % (ref 40.5–52.5)
HCT VFR BLD CALC: 19.5 % (ref 40.5–52.5)
HCT VFR BLD CALC: 20.1 % (ref 40.5–52.5)
HCT VFR BLD CALC: 20.1 % (ref 40.5–52.5)
HCT VFR BLD CALC: 22.5 % (ref 40.5–52.5)
HCT VFR BLD CALC: 22.6 % (ref 40.5–52.5)
HEMOGLOBIN: 6.5 G/DL (ref 13.5–17.5)
HEMOGLOBIN: 6.8 G/DL (ref 13.5–17.5)
HEMOGLOBIN: 6.9 G/DL (ref 13.5–17.5)
HEMOGLOBIN: 6.9 G/DL (ref 13.5–17.5)
HEMOGLOBIN: 7.7 G/DL (ref 13.5–17.5)
HEMOGLOBIN: 7.8 G/DL (ref 13.5–17.5)
INR BLD: 3.73 (ref 0.86–1.14)
LACTATE DEHYDROGENASE: 1153 U/L (ref 100–190)
LACTIC ACID: 0.8 MMOL/L (ref 0.4–2)
LACTIC ACID: 1 MMOL/L (ref 0.4–2)
LACTIC ACID: 2.1 MMOL/L (ref 0.4–2)
LYMPHOCYTES ABSOLUTE: 0 K/UL (ref 1–5.1)
LYMPHOCYTES ABSOLUTE: 0 K/UL (ref 1–5.1)
LYMPHOCYTES RELATIVE PERCENT: 0.4 %
LYMPHOCYTES RELATIVE PERCENT: 0.5 %
MCH RBC QN AUTO: 31.8 PG (ref 26–34)
MCH RBC QN AUTO: 32.4 PG (ref 26–34)
MCHC RBC AUTO-ENTMCNC: 34.2 G/DL (ref 31–36)
MCHC RBC AUTO-ENTMCNC: 35.2 G/DL (ref 31–36)
MCV RBC AUTO: 92 FL (ref 80–100)
MCV RBC AUTO: 93.1 FL (ref 80–100)
MONOCYTES ABSOLUTE: 0.2 K/UL (ref 0–1.3)
MONOCYTES ABSOLUTE: 0.4 K/UL (ref 0–1.3)
MONOCYTES RELATIVE PERCENT: 3.9 %
MONOCYTES RELATIVE PERCENT: 4.9 %
NEUTROPHILS ABSOLUTE: 6 K/UL (ref 1.7–7.7)
NEUTROPHILS ABSOLUTE: 7.9 K/UL (ref 1.7–7.7)
NEUTROPHILS RELATIVE PERCENT: 94.3 %
NEUTROPHILS RELATIVE PERCENT: 95.2 %
OCCULT BLOOD DIAGNOSTIC: ABNORMAL
PDW BLD-RTO: 21.1 % (ref 12.4–15.4)
PDW BLD-RTO: 21.5 % (ref 12.4–15.4)
PLATELET # BLD: 134 K/UL (ref 135–450)
PLATELET # BLD: 159 K/UL (ref 135–450)
PMV BLD AUTO: 7.3 FL (ref 5–10.5)
PMV BLD AUTO: 7.5 FL (ref 5–10.5)
POTASSIUM REFLEX MAGNESIUM: 4.2 MMOL/L (ref 3.5–5.1)
PROTHROMBIN TIME: 43.9 SEC (ref 10–13.2)
RBC # BLD: 2.03 M/UL (ref 4.2–5.9)
RBC # BLD: 2.12 M/UL (ref 4.2–5.9)
REPORT: NORMAL
SARS-COV-2, PCR: NOT DETECTED
SARS-COV-2, PCR: NOT DETECTED
SODIUM BLD-SCNC: 137 MMOL/L (ref 136–145)
TOTAL PROTEIN: 4.7 G/DL (ref 6.4–8.2)
TROPONIN: 0.22 NG/ML
TROPONIN: 0.23 NG/ML
TROPONIN: 0.24 NG/ML
TROPONIN: 0.25 NG/ML
TSH REFLEX: 1.78 UIU/ML (ref 0.27–4.2)
URINE CULTURE, ROUTINE: NORMAL
WBC # BLD: 6.4 K/UL (ref 4–11)
WBC # BLD: 8.4 K/UL (ref 4–11)

## 2020-06-22 PROCEDURE — 6360000002 HC RX W HCPCS: Performed by: STUDENT IN AN ORGANIZED HEALTH CARE EDUCATION/TRAINING PROGRAM

## 2020-06-22 PROCEDURE — 86901 BLOOD TYPING SEROLOGIC RH(D): CPT

## 2020-06-22 PROCEDURE — 80048 BASIC METABOLIC PNL TOTAL CA: CPT

## 2020-06-22 PROCEDURE — 86900 BLOOD TYPING SEROLOGIC ABO: CPT

## 2020-06-22 PROCEDURE — 83605 ASSAY OF LACTIC ACID: CPT

## 2020-06-22 PROCEDURE — 36620 INSERTION CATHETER ARTERY: CPT

## 2020-06-22 PROCEDURE — 86923 COMPATIBILITY TEST ELECTRIC: CPT

## 2020-06-22 PROCEDURE — 85014 HEMATOCRIT: CPT

## 2020-06-22 PROCEDURE — 85610 PROTHROMBIN TIME: CPT

## 2020-06-22 PROCEDURE — 86850 RBC ANTIBODY SCREEN: CPT

## 2020-06-22 PROCEDURE — 85025 COMPLETE CBC W/AUTO DIFF WBC: CPT

## 2020-06-22 PROCEDURE — 85018 HEMOGLOBIN: CPT

## 2020-06-22 PROCEDURE — 2580000003 HC RX 258: Performed by: STUDENT IN AN ORGANIZED HEALTH CARE EDUCATION/TRAINING PROGRAM

## 2020-06-22 PROCEDURE — 2500000003 HC RX 250 WO HCPCS: Performed by: STUDENT IN AN ORGANIZED HEALTH CARE EDUCATION/TRAINING PROGRAM

## 2020-06-22 PROCEDURE — 99222 1ST HOSP IP/OBS MODERATE 55: CPT | Performed by: NURSE PRACTITIONER

## 2020-06-22 PROCEDURE — 87040 BLOOD CULTURE FOR BACTERIA: CPT

## 2020-06-22 PROCEDURE — 36620 INSERTION CATHETER ARTERY: CPT | Performed by: INTERNAL MEDICINE

## 2020-06-22 PROCEDURE — 85384 FIBRINOGEN ACTIVITY: CPT

## 2020-06-22 PROCEDURE — 94761 N-INVAS EAR/PLS OXIMETRY MLT: CPT

## 2020-06-22 PROCEDURE — 6370000000 HC RX 637 (ALT 250 FOR IP): Performed by: STUDENT IN AN ORGANIZED HEALTH CARE EDUCATION/TRAINING PROGRAM

## 2020-06-22 PROCEDURE — 2700000000 HC OXYGEN THERAPY PER DAY

## 2020-06-22 PROCEDURE — 84484 ASSAY OF TROPONIN QUANT: CPT

## 2020-06-22 PROCEDURE — 36592 COLLECT BLOOD FROM PICC: CPT

## 2020-06-22 PROCEDURE — 36430 TRANSFUSION BLD/BLD COMPNT: CPT

## 2020-06-22 PROCEDURE — 99291 CRITICAL CARE FIRST HOUR: CPT | Performed by: INTERNAL MEDICINE

## 2020-06-22 PROCEDURE — 80076 HEPATIC FUNCTION PANEL: CPT

## 2020-06-22 PROCEDURE — 83010 ASSAY OF HAPTOGLOBIN QUANT: CPT

## 2020-06-22 PROCEDURE — 36415 COLL VENOUS BLD VENIPUNCTURE: CPT

## 2020-06-22 PROCEDURE — 71250 CT THORAX DX C-: CPT

## 2020-06-22 PROCEDURE — G0328 FECAL BLOOD SCRN IMMUNOASSAY: HCPCS

## 2020-06-22 PROCEDURE — P9016 RBC LEUKOCYTES REDUCED: HCPCS

## 2020-06-22 PROCEDURE — 2000000000 HC ICU R&B

## 2020-06-22 RX ORDER — UREA 10 %
6 LOTION (ML) TOPICAL NIGHTLY PRN
Status: DISCONTINUED | OUTPATIENT
Start: 2020-06-22 | End: 2020-07-01 | Stop reason: HOSPADM

## 2020-06-22 RX ORDER — 0.9 % SODIUM CHLORIDE 0.9 %
250 INTRAVENOUS SOLUTION INTRAVENOUS ONCE
Status: DISCONTINUED | OUTPATIENT
Start: 2020-06-22 | End: 2020-07-01 | Stop reason: HOSPADM

## 2020-06-22 RX ORDER — CARBOXYMETHYLCELLULOSE SODIUM 5 MG/ML
2 SOLUTION/ DROPS OPHTHALMIC EVERY 6 HOURS PRN
COMMUNITY

## 2020-06-22 RX ORDER — 0.9 % SODIUM CHLORIDE 0.9 %
250 INTRAVENOUS SOLUTION INTRAVENOUS ONCE
Status: COMPLETED | OUTPATIENT
Start: 2020-06-22 | End: 2020-06-22

## 2020-06-22 RX ORDER — SODIUM CHLORIDE 9 MG/ML
INJECTION, SOLUTION INTRAVENOUS
Status: DISCONTINUED
Start: 2020-06-22 | End: 2020-06-22

## 2020-06-22 RX ORDER — 0.9 % SODIUM CHLORIDE 0.9 %
500 INTRAVENOUS SOLUTION INTRAVENOUS ONCE
Status: COMPLETED | OUTPATIENT
Start: 2020-06-22 | End: 2020-06-22

## 2020-06-22 RX ADMIN — SODIUM CHLORIDE: 9 INJECTION, SOLUTION INTRAVENOUS at 00:51

## 2020-06-22 RX ADMIN — Medication 20 MG: at 08:55

## 2020-06-22 RX ADMIN — VANCOMYCIN HYDROCHLORIDE 1000 MG: 10 INJECTION, POWDER, LYOPHILIZED, FOR SOLUTION INTRAVENOUS at 10:31

## 2020-06-22 RX ADMIN — ASPIRIN 81 MG: 81 TABLET, COATED ORAL at 09:26

## 2020-06-22 RX ADMIN — Medication 10 ML: at 08:55

## 2020-06-22 RX ADMIN — ACETAMINOPHEN 650 MG: 325 TABLET ORAL at 16:16

## 2020-06-22 RX ADMIN — SERTRALINE HYDROCHLORIDE 100 MG: 100 TABLET ORAL at 08:55

## 2020-06-22 RX ADMIN — ONDANSETRON 4 MG: 2 INJECTION INTRAMUSCULAR; INTRAVENOUS at 16:23

## 2020-06-22 RX ADMIN — DOCUSATE SODIUM 50 MG AND SENNOSIDES 8.6 MG 1 TABLET: 8.6; 5 TABLET, FILM COATED ORAL at 08:55

## 2020-06-22 RX ADMIN — SODIUM CHLORIDE: 9 INJECTION, SOLUTION INTRAVENOUS at 21:05

## 2020-06-22 RX ADMIN — VASOPRESSIN 0.04 UNITS/MIN: 20 INJECTION INTRAVENOUS at 00:01

## 2020-06-22 RX ADMIN — POLYETHYLENE GLYCOL (3350) 17 G: 17 POWDER, FOR SOLUTION ORAL at 08:55

## 2020-06-22 RX ADMIN — CEFEPIME HYDROCHLORIDE 2 G: 2 INJECTION, POWDER, FOR SOLUTION INTRAVENOUS at 04:28

## 2020-06-22 RX ADMIN — SODIUM CHLORIDE 250 ML: 9 INJECTION, SOLUTION INTRAVENOUS at 07:13

## 2020-06-22 RX ADMIN — SODIUM CHLORIDE 250 ML: 9 INJECTION, SOLUTION INTRAVENOUS at 11:30

## 2020-06-22 RX ADMIN — Medication 10 ML: at 22:12

## 2020-06-22 RX ADMIN — SODIUM CHLORIDE 500 ML: 9 INJECTION, SOLUTION INTRAVENOUS at 02:21

## 2020-06-22 RX ADMIN — VANCOMYCIN HYDROCHLORIDE 1000 MG: 10 INJECTION, POWDER, LYOPHILIZED, FOR SOLUTION INTRAVENOUS at 21:55

## 2020-06-22 RX ADMIN — DOCUSATE SODIUM 50 MG AND SENNOSIDES 8.6 MG 1 TABLET: 8.6; 5 TABLET, FILM COATED ORAL at 21:55

## 2020-06-22 RX ADMIN — ONDANSETRON 4 MG: 2 INJECTION INTRAMUSCULAR; INTRAVENOUS at 21:55

## 2020-06-22 RX ADMIN — ATORVASTATIN CALCIUM 40 MG: 40 TABLET, FILM COATED ORAL at 08:55

## 2020-06-22 RX ADMIN — ONDANSETRON 4 MG: 2 INJECTION INTRAMUSCULAR; INTRAVENOUS at 01:59

## 2020-06-22 RX ADMIN — ACETAMINOPHEN 650 MG: 325 TABLET ORAL at 08:56

## 2020-06-22 RX ADMIN — TAMSULOSIN HYDROCHLORIDE 0.4 MG: 0.4 CAPSULE ORAL at 08:55

## 2020-06-22 RX ADMIN — Medication 20 MG: at 21:55

## 2020-06-22 RX ADMIN — CEFEPIME 2 G: 2 INJECTION, POWDER, FOR SOLUTION INTRAVENOUS at 13:27

## 2020-06-22 RX ADMIN — CEFEPIME 2 G: 2 INJECTION, POWDER, FOR SOLUTION INTRAVENOUS at 21:06

## 2020-06-22 ASSESSMENT — PAIN SCALES - GENERAL
PAINLEVEL_OUTOF10: 0
PAINLEVEL_OUTOF10: 3
PAINLEVEL_OUTOF10: 0

## 2020-06-22 ASSESSMENT — PAIN DESCRIPTION - PAIN TYPE: TYPE: CHRONIC PAIN

## 2020-06-22 ASSESSMENT — PAIN DESCRIPTION - LOCATION: LOCATION: THROAT

## 2020-06-22 NOTE — PLAN OF CARE
Problem: Falls - Risk of:  Goal: Will remain free from falls  Description: Will remain free from falls  Outcome: Ongoing     Problem: Safety:  Goal: Free from accidental physical injury  Description: Free from accidental physical injury  Outcome: Ongoing     Problem: Infection:  Goal: Will remain free from infection  Description: Will remain free from infection  Outcome: Ongoing     Problem: Pain:  Goal: Patient's pain/discomfort is manageable  Description: Patient's pain/discomfort is manageable  Outcome: Ongoing

## 2020-06-22 NOTE — CONSULTS
Warfarin has been discontinued by Dr. Bryan Mcgowan for concern of bleeding - pharmacy will sign off on this consult - please re-consult pharmacy to dose when appropriate. Please call pharmacy with any questions! Schuyler Bro, [address] [facility]  [facility]         Clinical Pharmacy Consult Note    Admit date: 6/21/2020    Subjective/Objective:  63 yo male with PMHx that includes \"Stage 4 SCC of tongue base s/p cisplatin and XRT last dose 6/12/20, CVA 1999 w left hemiparesis, CAD s/p stent 1997, Mechanial Aortic Valve Replacement 2000 on warfarin, HTN, HLD, BPH, GERD, and depression\" who is admitted with \"from his SNF ([facility]) with fever to 106, hypotension to MAP 50-55, mild hypoxia on room air\"    Pharmacy is consulted to dose warfarin per Dr. Jose Grover anticoagulation regimen:  Warfarin 11 mg PO daily      Date INR Warfarin Dose   06/21/2020 3.44  Unsure if taken at nursing home/home                      Recent Labs     06/21/20  1530      K 4.1      CO2 24   BUN 32*   CREATININE 1.3   GLUCOSE 149*       Estimated Creatinine Clearance: 54 mL/min (based on SCr of 1.3 mg/dL). Lab Results   Component Value Date    WBC 9.1 06/21/2020    HGB 10.0 (L) 06/21/2020    HCT 29.2 (L) 06/21/2020    MCV 93.4 06/21/2020     06/21/2020       Lab Results   Component Value Date    PROTIME 40.4 (H) 06/21/2020    INR 3.44 (H) 06/21/2020       Height:  5' 10\" (177.8 cm)  Weight:  136 lb 11 oz (62 kg)        Assessment/Plan:  1. Anticoagulation:  Mechanial Aortic Valve Replacement in 2000   · INR goal 2.5 - 3.5   · Warfarin dose 11 mg daily - confirmed by nursing home paperwork arrived with patient. · INR at admission 3.44 - therapeutic - will continue home dose as above   · Medication profile reviewed for potential drug interactions. No significant drug interactions with warfarin noted. · Daily INR will be monitored and dose adjustments made as needed.

## 2020-06-22 NOTE — PLAN OF CARE
Problem: Nutrition  Goal: Optimal nutrition therapy  Outcome: Ongoing   Nutrition Problem: Inadequate oral intake  Intervention: Food and/or Nutrient Delivery: Continue NPO, Start Tube Feeding  Nutritional Goals: Pt will tolerate the most appropriate nutrition support regimen to meet >75% of needs once initiated.

## 2020-06-22 NOTE — CARE COORDINATION
Case Management Assessment           Daily Note                 Date/ Time of Note: 6/22/2020 4:14 PM         Note completed by: [place] Ernst Day    Patient Name: Ha Garcia  YOB: 1960    Diagnosis:Severe sepsis ([address] [A41.9, R65.20]  Severe sepsis ([facility] [address] [A41.9, R65.20]  Patient Admission Status: Inpatient    Date of Admission:6/21/2020  3:01 PM Length of Stay: 1 GLOS:        Current Plan of Care: COVID negative. HFNC, Pressor support, Prbc transfusion,   ________________________________________________________________________________________  PT AM-PAC:   / 24 per last evaluation on: Pending     OT AM-PAC:   / 24 per last evaluation on: Pending     DME Needs for discharge: Defer  ________________________________________________________________________________________  Discharge Plan: Other: From [facility], previously at [facility], [facility]. Tentative discharge date: TBD    Current barriers to discharge: Medical Clearance, verify HCPOA paperwork. Referrals completed: Not Applicable    Resources/ information provided: Not indicated at this time  ________________________________________________________________________________________  Case Management Notes: SW rounded this AM, however patient was in COVID-19 rule out at that time. Patient is from [facility]. Per Rn had large hgb drop today. SW to assist in locating HCPOA paperwork with Palliative. SW to complete full assessment tomorrow. Kristy Rocha and his family were provided with choice of provider; he and his family are in agreement with the discharge plan.     Care Transition Patient: No    Janel Muller, MSW  The [address] [place] [address] ICU  Case Management Department  Ph: [phone]

## 2020-06-22 NOTE — CONSULTS
NUTRITION ASSESSMENT  Admission Date: 6/21/2020     Type and Reason for Visit: Consult, Initial    ENTERAL NUTRITION RECOMMENDATIONS:   1. Recommend order \"Diet: Tube feed continuous/ NPO\". Initiate Jevity 1.2 (Standard with fiber formula) at 25 mL/hr and as tolerated, increase by 25 mL/hr q 4 hours until goal of 50 mL/hr is met via PEG. 2. Recommend maintenance water flush of 30 ml q 4 hours. If IV discontinued, recommend 100 mL H20 q 4 hours to meet 100% hydration needs. 3. Recommend 1 Bottle Proteinex P2Go daily via syringe. Flush with 30 mL H20 before and after. Proteinex P2Go should not be mixed directly with the tube feeding formula. 4. Ensure head of bed is 30 - 45 degrees during continuous or bolus gastric feeding and for one hour after bolus. Turn off the feeding if head of bed is lowered less than 30 degrees. 5. Monitor for tolerance (bowel habits, N/V, cramping). NUTRITION ASSESSMENT:  RD consult for TF management as pt w/ PEG for EN. Noted PMHx of Stage 4 SCC of tongue base , CVA, CAD, HTN, HLD, BPH, GERD and depression. Pt admitted for hypotension and fever; currently on pressors but decreasing. Pt at nutritional compromise d/t NPO status in the context of catabolic illness. RD recommends starting EN and pt will be closely monitored. Due to current CDC guidelines recommending 6-ft distancing for social isolation for COVID19 prevention, in lieu of NFPE this dietitian was unable to visibly assess signs and symptoms of malnutrition, as indicated below    MALNUTRITION ASSESSMENT  Context: Acute illness or injury   Malnutrition Status:  At risk for malnutrition  Findings of the 6 clinical characteristics of malnutrition (Minimum of 2 out of 6 clinical characteristics is required to make the diagnosis of moderate or severe Protein Calorie Malnutrition based on AND/ASPEN Guidelines):  Energy Intake %: Less than or equal to 50% of estimated energy requirement  Energy Intake Time: Unable to assess  Interpretation of Weight Loss %: Unable to assess(no wt Hx per EMR)  Interpretation of Weight Loss Time: in 1 year  Body Fat Status: Unable to assess(Isolation for covid r/o)     Muscle Mass Status: Unable to assess(Isolation for covid r/o)     Fluid Accumulation Status: No significant fluid accumulation(per EMR)     Reduced  Strength: Not measured    NUTRITION DIAGNOSIS   Problem: Inadequate Oral Intake  Etiology: Acute or chronic injury or trauma  Signs & Symptoms: NPO status due to medical condition and Nutrition [address] and/or Nutrient Delivery:Continue NPO or Start Tube Feeding   Nutrition education/counseling/coordination of care: Continue Inpatient Monitoring     NUTRITION MONITORING & EVALUATION:  Evaluation:Goals set   Goals: Pt will tolerate the most appropriate nutrition support regimen to meet >75% of needs once initiated. Monitoring: Pertinent Labs , TF Intake  or TF Tolerance      OBJECTIVE DATA:  · Nutrition-Focused Physical Findings: Cachectic appearance per MD. LBM 6/16. No edema. On pressors.    · Wounds None      Past Medical History:   Diagnosis Date    Adult failure to thrive     Dysphagia, unspecified     Encounter for [facility] to gastrostomy ([facility] [address]     GERD without esophagitis     Hyperlipidemia     Hypertension     Legal blindness, as defined in [address] term (current) use of anticoagulants     Malignant neoplasm of oropharynx, unspecified (HCC)     Muscle weakness (generalized)     Overactive bladder     Presence of prosthetic heart valve     Secondary and unspecified malignant neoplasm of lymph nodes of head, face and neck (HCC)     Unspecified severe protein-calorie malnutrition (HCC)     Unsteadiness on feet         ANTHROPOMETRICS  Current Height: 5' 10\" (177.8 cm)  Current Weight: 136 lb 11 oz (62 kg)    Admission weight: 138 lb (62.6 kg)  Ideal Bodyweight 166 lb (75.4 kg)   Usual Bodyweight BRYAN   Adjusted Bodyweight n/a  Weight Changes BRYAN       BMI BMI (Calculated): 19.7    Wt Readings from Last 50 Encounters:   06/21/20 136 lb 11 oz (62 kg)       COMPARATIVE STANDARDS  Estimated Total Kcals/Day : 25-30  Current Bodyweight (62 kg) 9927-4586 kcal    Estimated Total Protein (g/day) : 1.3-1.5 Current Bodyweight (62 kg) 80-93 g/day  Estimated Daily Total Fluid (ml/day): 0774-7459 mL per day     Food / Nutrition-Related History  Pre-Admission / Home Diet:  Pre-Admission/Home Diet: Enteral Nutrition   Home Supplements / Herbals:    none noted  Food Restrictions / Cultural Requests:    none noted    Diet Orders / Intake / Nutrition Support  Current diet/supplement order: Diet NPO Effective Now     NSG Recorded PO:   PO Fluids    PO Meals     PO Intake: NPO  PO Supplement: NPO   PO Supplement Intake: NPO  IVF: 125 ml/hr     Tube Feeding Goal: Jevity 1.2 (Standard with Fiber formula) @ 50 mL/hr provides 1200 mL TV, 1440 kcal, 66 grams protein, 968 mL free water. Maintenance water flush of 30 ml every 4 hours. Provide water bolus 100 mL every 4 hrs when no IVF. Additional Calories: Proteinex 2go provides and additional 104 kcal and 26 g of protein.       NUTRITION RISK LEVEL: Risk Level: High    Ara Ramesh, [address] [place] [address],   [place]:  [phone]  Office:  [phone]

## 2020-06-22 NOTE — CONSULTS
for Consult:         __x___ Goals of Care  __x___ Code Status Discussion/Advanced Care Planning   __x___ Psychosocial/Family Support  _____ Symptom Management  _____ Other Scarlet Syed)    Requesting Physician: Dr. Meredith Medicus: Hypotension, fever    History Obtained From:  family member - daughter, electronic medical record    History of Present Illness:         Mr. 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 He was admitted with severe sepsis with suspected pneumonia and suspected covid-19. Subjective:                     Past Medical History:        Diagnosis Date    Adult failure to thrive     Dysphagia, unspecified     Encounter for attention to gastrostomy ([facility] [address]     GERD without esophagitis     Hyperlipidemia     Hypertension     Legal blindness, as defined in [address] term (current) use of anticoagulants     Malignant neoplasm of oropharynx, unspecified (HCC)     Muscle weakness (generalized)     Overactive bladder     Presence of prosthetic heart valve     Secondary and unspecified malignant neoplasm of lymph nodes of head, face and neck (HCC)     Unspecified severe protein-calorie malnutrition (HCC)     Unsteadiness on feet        Past Surgical History:    No past surgical history on file.     Current Medications:    Current Facility-Administered Medications: 0.9 % sodium chloride bolus, 250 mL, Intravenous, Once  norepinephrine (LEVOPHED) 16 mg in dextrose 5 % 250 mL infusion, 5 mcg/min, Intravenous, Continuous  aspirin EC tablet 81 mg, 81 mg, Oral, Daily  atorvastatin (LIPITOR) tablet 40 mg, 40 mg, Oral, Daily  oxybutynin (DITROPAN-XL) extended release tablet 10 mg, 10 mg, Oral, Daily  sertraline (ZOLOFT) tablet 100 mg, 100 mg, Oral, Daily  tamsulosin (FLOMAX) capsule 0.4 mg, 0.4 mg, Oral, Daily  traMADol (ULTRAM) tablet 50 mg, 50 mg, Oral, Q6H PRN  sodium chloride flush 0.9 % injection 10 mL, 10 mL, Intravenous, 2 times per day  sodium chloride flush 0.9 % injection 10 mL, 10 mL, Intravenous, PRN  acetaminophen (TYLENOL) tablet 650 mg, 650 mg, Oral, Q6H PRN **OR** acetaminophen (TYLENOL) suppository 650 mg, 650 mg, Rectal, Q6H PRN  potassium chloride 10 mEq/100 mL IVPB (Peripheral Line), 10 mEq, Intravenous, PRN  magnesium sulfate 2 g in 50 mL IVPB premix, 2 g, Intravenous, PRN  polyethylene glycol (GLYCOLAX) packet 17 g, 17 g, Oral, Daily  sennosides-docusate sodium (SENOKOT-S) 8.6-50 MG tablet 1 tablet, 1 tablet, Oral, BID  ondansetron (ZOFRAN) injection 4 mg, 4 mg, Intravenous, Q6H PRN  famotidine (PEPCID) injection 20 mg, 20 mg, Intravenous, BID  cefepime (MAXIPIME) 2 g IVPB minibag, 2 g, Intravenous, Q12H  vancomycin (VANCOCIN) 1,000 mg in dextrose 5 % 250 mL IVPB, 1,000 mg, Intravenous, Q18H  vasopressin 20 Units in dextrose 5 % 100 mL infusion, 0.04 Units/min, Intravenous, Continuous  [COMPLETED] 0.9 % sodium chloride bolus, 500 mL, Intravenous, Once **FOLLOWED BY** 0.9 % sodium chloride infusion, , Intravenous, Continuous    Allergies:  Patient has no known allergies.     Social History:    MARITAL STATUS:    Patient currently lives in a nursing home    Review of Systems -   General ROS: negative  Psychological ROS: negative  ENT ROS: negative  Hematological and Lymphatic ROS: positive for - fatigue  Respiratory ROS: no cough, shortness of breath, or wheezing  Cardiovascular ROS: no chest pain or dyspnea on exertion  Gastrointestinal ROS: positive for - constipation and swallowing difficulty/pain  Genito-Urinary ROS: no dysuria, trouble voiding, or hematuria  Musculoskeletal ROS: positive for - neck pain, left sided hemiparesis  Neurological ROS: positive for - CVA in 1999    Objective:        PHYSICAL EXAM:    General appearance: alert, appears stated age and cooperative  Lungs: diminished breath sounds bilaterally  Heart: regular rate and rhythm and positive murmur  Abdomen: round, soft, PEG in place  Extremities: no edema, LUE contracted  Skin: pale, warm, dry  Neurologic: Mental status: answers questions appropriately via written communication    Palliative Performance Scale:  60% ? Ambulation reduced; Significant disease; Can't do hobbies/housework; intake normal   or reduced; occasional assist; LOC full/confusion  50% ? Mainly sit/lie; Extensive disease; Can't do any work; Considerable assist; intake normal  Or reduced; LOC full/confusion  40% ? Mainly in bed; Extensive disease; Mainly assist; intake normal or reduced; occasional assist; LOC full/confusion  30% ? Bed Bound; Extensive disease; Total care; intake reduced; LOC full/confusion  20% ? Bed Bound; Extensive disease; Total care; intake minimal; Drowsy/coma  10% ? Bed Bound; Extensive disease; Total care; Mouth care only; Drowsy/coma  0 ?  Death    PPS: 30%    Vitals:    BP (!) 132/50   Pulse 79   Temp 99.9 °F (37.7 °C) (Core)   Resp 17   Ht 5' 10\" (1.778 m)   Wt 136 lb 11 oz (62 kg)   SpO2 94%   BMI 19.61 kg/m²     DATA:    CBC with Differential:    Lab Results   Component Value Date    WBC 6.4 06/22/2020    RBC 2.03 06/22/2020    HGB 6.5 06/22/2020    HCT 18.9 06/22/2020     06/22/2020    MCV 93.1 06/22/2020    MCH 31.8 06/22/2020    MCHC 34.2 06/22/2020    RDW 21.1 06/22/2020    BANDSPCT 7 06/21/2020    METASPCT 6 06/21/2020    LYMPHOPCT 0.4 06/22/2020    MONOPCT 3.9 06/22/2020    BASOPCT 0.4 06/22/2020    MONOSABS 0.2 06/22/2020    LYMPHSABS 0.0 06/22/2020    EOSABS 0.0 06/22/2020    BASOSABS 0.0 06/22/2020     BMP:    Lab Results   Component Value Date     06/22/2020    K 4.2 06/22/2020     06/22/2020    CO2 23 06/22/2020    BUN 35 06/22/2020    LABALBU 2.6 06/22/2020    CREATININE 0.8 06/22/2020    CALCIUM 7.8 06/22/2020    GFRAA >60 06/22/2020    LABGLOM >60 06/22/2020 GLUCOSE 193 06/22/2020     Albumin:    Lab Results   Component Value Date    LABALBU 2.6 06/22/2020         Conclusion/Time spent:         Recommendations see above    Pt 6719-4374, [id number]  Time spent with patient and/or family: 39  Time reviewing records: 10  Time communicating with staff: 5    A total of 60 minutes spent with the patient and family on unit greater than 50% in counseling regarding palliative care and in goals of care for the patient. Thank you to Dr. Philipp Lundborg for this consultation. We will continue to follow Mr. Boogie Kenji mario as needed.       Nolberto Jha NP  [address]

## 2020-06-22 NOTE — PROGRESS NOTES
A-line waveform flat on monitor. Attempted to salvage by changing dressing however signs of infiltration was noted. ICU team aware. A-line removed per verbal order. Patient remains off pressors at this time with goal systolic >433. Will monitor.

## 2020-06-22 NOTE — ED NOTES
Report called to ICU RN will be down shortly to take patient to ICU     Carlota Holbrook RN  06/21/20 3102

## 2020-06-22 NOTE — PROGRESS NOTES
Clinical Pharmacy Consult Note    Admit date: 6/21/2020    Interval Update:  Warfarin stopped; transfusing 1 unit PRBC 2/2 hgb drop. COVID rapid test negative. Subjective/Objective:  64 yom with PMHx significant for h/o Stage 4 SCC of tongue base on chemo s/p PEG tube, CVA with L hemiparesis, CAD s/p stents, HLP, HTN, mechanical Aortic valve replacement (2000) on warfarin, BPH, GERD, and depression. Presented from [facility] with fever (Tmax 102.9F in ED), tachycardia, and hypotension. Admitted for treatment of sepsis with IV antibiotics, as well as concern for COVID based on CXR. Pharmacy is consulted to dose Vancomycin per Dr. Carlos Robins. Pertinent Medications:  Cefepime 2g IV q12h -- day #2  Vancomycin, pharmacy to dose -- day #2   1.5g IV x1 (6/21)   1g IV q18h (6/22)   1g IV q12h (6/22-current)    Home anticoagulation regimen:   · Patient with Mech AoV on warfarin. Target INR range 2.5-3.5  · Resides at [facility]. Awaiting faxed copy of Med list from RN. · INR upon admission therapeutic = 3.44    Date INR Warfarin Dose   6/21 3.44  --   6/22  3.73 --                       Recent Labs     06/21/20  1530 06/22/20  0408    137   K 4.1 4.2    104   CO2 24 23   BUN 32* 35*   CREATININE 1.3 0.8*     Estimated Creatinine Clearance: 88 mL/min (A) (based on SCr of 0.8 mg/dL (L)). Recent Labs     06/22/20  0408 06/22/20  0557 06/22/20  1040   WBC 8.4 6.4  --    HGB 6.9* 6.5* 6.9*   HCT 19.5* 18.9* 20.1*   MCV 92.0 93.1  --     134*  --        Height:  5' 10\" (177.8 cm)  Weight: 136 lb 11 oz (62 kg)    Micro:  Date Site Micro Susceptibility   6/21 Urine In process    6/21 Blood x2 In process    6/21  COVID-19 Not detected    6/21 COVID-19 In process        Assessment/Plan:  1. Sepsis:  Empiric PNA; COVID rule out. Still requiring pressors although down-trending rates.   Cefepime + vancomycin day #2  Vancomycin - pharmacy to dose  · Currently on 1g IV q18h after receiving a 1. 5g IV load in the ED. · Scr has improved overnight, from 1.3 to 0.8. Potentially near baseline now. Will adjust dose to reflect improved function; will start 1g IV q12h. · Plan to obtain a trough level once at steady state. Target 15-20 mcg/mL for PNA. · Renal function will be monitored closely and dosing will be adjusted as appropriate. Cefepime  · Currently on 2g IV q12h. · Renal function improved overnight; estCrCL now ~88 mL/min. · Will adjusted to 2g IV q8h based on indication/renal function per [address] Renal Dose adjustment policy. · Will continue to follow. Please call with any questions.   Sree GranadoD., BCPS   6/22/2020 11:36 AM  Wireless: [phone]

## 2020-06-22 NOTE — CONSULTS
Consult received; patient anemic and labs consistent with hemolysis in setting of sepsis. No overt bleeding. FOBT was positive in setting of PEG tube and proctitis on imaging with stool present.  Full consult to follow

## 2020-06-22 NOTE — PROGRESS NOTES
ICU Progress Note    Admit Date: 6/21/2020  Day: 2  Vent Day: None  IV Access:Peripheral  IV Fluids:None  Vasopressors:None                Antibiotics: None  Diet: Diet NPO Effective Now    CC: Hypotension, Fever     Interval history: Pt seen and examined this morning. Resting comfortably with no new complaints. Arterial line placed overnight, was correlating with BP cuff for the short time it was able to remain seated in artery. Vasopressin added overnight. Additional fluids added overnight. Remains on high-flow nasal cannula. Denies any fevers, chills, chest pain, palpitations, leg swelling, cough, shortness of breath, difficulty breathing, wheezing, abdominal pain, nausea, vomiting, diarrhea. HPI: Mr. Mandi Rodriguez is an unfortunate 65yo male PMH Stage 4 SCC of tongue base s/p cisplatin and XRT last dose 6/12/20, CVA 1999 w left hemiparesis, CAD s/p stent 1997, Mechanial Aortic Valve Replacement 2000 on warfarin, HTN, HLD, BPH, GERD, and depression.               He presents from his SNF ([facility]) with fever to 106, hypotension to MAP 50-55, mild hypoxia on room air. He is minimally responsive to sepsis fluid bolus. He is very hard of hearing, but can indicate that he feels his normal self and is unaware of any acute issues. He denies all problems except for 7 days of constipation. He prefers to be FULL CODE.                KRYSTA the ED, his CXR shows BL diffuse airspace disease. There is concern for COVID, however initial rapid test was negative. Given our high suspicion we will retest. He will be admitted to the  for pressors.      Medications:     Scheduled Meds:   sodium chloride  250 mL Intravenous Once    aspirin  81 mg Oral Daily    atorvastatin  40 mg Oral Daily    oxybutynin  10 mg Oral Daily    sertraline  100 mg Oral Daily    tamsulosin  0.4 mg Oral Daily    sodium chloride flush  10 mL Intravenous 2 times per day    polyethylene glycol  17 g Oral Daily    sennosides-docusate sodium  1 tablet Oral BID    famotidine (PEPCID) injection  20 mg Intravenous BID    cefepime  2 g Intravenous Q12H    vancomycin  1,000 mg Intravenous Q18H     Continuous Infusions:   norepinephrine 12 mcg/min (06/22/20 0610)    vasopressin (Septic Shock) infusion Stopped (06/22/20 0530)    sodium chloride 125 mL/hr at 06/22/20 0051     PRN Meds:traMADol, sodium chloride flush, acetaminophen **OR** acetaminophen, potassium chloride, magnesium sulfate, ondansetron    Objective:   Vitals:   T-max:  Patient Vitals for the past 8 hrs:   BP Temp Temp src Pulse Resp SpO2   06/22/20 0710 -- (P) 99.9 °F (37.7 °C) (P) Bladder -- -- --   06/22/20 0657 (!) 117/49 99.9 °F (37.7 °C) Bladder 71 28 100 %   06/22/20 0645 (!) 117/49 -- -- 71 30 100 %   06/22/20 0630 (!) 102/50 -- -- 73 30 --   06/22/20 0615 (!) 119/58 -- -- 84 30 --   06/22/20 0600 (!) 110/43 -- -- 86 22 --   06/22/20 0545 (!) 128/45 -- -- 79 17 --   06/22/20 0530 (!) 116/51 -- -- 82 24 --   06/22/20 0515 (!) 140/111 -- -- 77 20 --   06/22/20 0500 (!) 118/55 -- -- 73 (!) 36 --   06/22/20 0445 124/69 -- -- 76 30 --   06/22/20 0430 (!) 136/45 -- -- 68 (!) 35 --   06/22/20 0415 100/66 -- -- 73 (!) 35 --   06/22/20 0400 109/71 100.4 °F (38 °C) Bladder 72 (!) 31 100 %   06/22/20 0345 (!) 89/52 -- -- 77 14 --   06/22/20 0242 -- -- -- -- (!) 35 96 %   06/22/20 0230 (!) 73/57 -- -- 71 25 --   06/22/20 0215 (!) 88/53 -- -- 76 (!) 31 --   06/22/20 0200 (!) 74/49 -- -- 74 (!) 36 --   06/22/20 0115 94/78 -- -- 82 (!) 41 --   06/22/20 0100 (!) 98/59 -- -- 74 28 --   06/22/20 0045 (!) 109/51 -- -- 76 (!) 31 --   06/22/20 0030 104/82 -- -- 79 (!) 32 --   06/22/20 0015 (!) 114/102 -- -- 84 24 --   06/22/20 0000 (!) 103/55 102.2 °F (39 °C) Bladder 85 (!) 39 91 %   06/21/20 2345 (!) 98/55 -- -- 90 (!) 37 --   06/21/20 2330 89/70 -- -- 89 (!) 37 --       Intake/Output Summary (Last 24 hours) at 6/22/2020 0716  Last data filed at 6/22/2020 0600  Gross per 24 hour   Intake 3034 ml   Output 425 ml Net 2609 ml       Access:   -Peripheral Access Day#:2  -Central Access Day #: PICC 2 lumen R Basilic PoA                                                    -Arterial line Day#:                                  Alejandro Day#:   NGT Day#: PEG tube PoA                                            ETT Day#:  Vent Settings:    / / /       Physical Exam  Vitals signs and nursing note reviewed. Constitutional:       Appearance: He is ill-appearing. He is not toxic-appearing. Comments: Cachectic, pale, hearing aids in place   HENT:      Nose: Nose normal.      Comments: Nasal cannula in place     Mouth/Throat:      Mouth: Mucous membranes are dry. Comments: Caked with food waste, biofilm. halitosis  Eyes:      General: No scleral icterus. Conjunctiva/sclera: Conjunctivae normal.      Pupils: Pupils are equal, round, and reactive to light. Neck:      Musculoskeletal: Normal range of motion. Cardiovascular:      Rate and Rhythm: Normal rate and regular rhythm. Pulses: Normal pulses. Heart sounds: Murmur (4/6 pansystolic loudest at left sternal border) present. Pulmonary:      Effort: Pulmonary effort is normal. No respiratory distress. Breath sounds: Wheezing (diffuse BL) present. Abdominal:      General: Abdomen is flat. There is no distension. Palpations: Abdomen is soft. Tenderness: There is no abdominal tenderness. There is no guarding. Comments: Scaphoid, PEG tube in place   Musculoskeletal: Normal range of motion. General: No swelling. Right lower leg: No edema. Left lower leg: No edema. Comments: L arm flexion contracture   Skin:     Capillary Refill: Capillary refill takes less than 2 seconds. Neurological:      General: No focal deficit present. Mental Status: He is alert and oriented to person, place, and time. Mental status is at baseline. Cranial Nerves: No cranial nerve deficit. Sensory: No sensory deficit.       Motor: Weakness (left hemiplegia) present. Psychiatric:         Mood and Affect: Mood normal.         LABS:    CBC:   Recent Labs     06/21/20  1530 06/22/20  0408 06/22/20  0557   WBC 9.1 8.4 6.4   HGB 10.0* 6.9* 6.5*   HCT 29.2* 19.5* 18.9*    159 134*   MCV 93.4 92.0 93.1     Renal:    Recent Labs     06/21/20  1530 06/22/20  0408    137   K 4.1 4.2    104   CO2 24 23   BUN 32* 35*   CREATININE 1.3 0.8*   GLUCOSE 149* 193*   CALCIUM 9.1 7.8*   ANIONGAP 12 10     Hepatic:   Recent Labs     06/21/20  1530 06/22/20  0625   AST 72* 52*   ALT 44* 35   BILITOT 1.7* 1.2*   BILIDIR  --  0.4*   PROT 5.9* 4.7*   LABALBU 3.5 2.6*   ALKPHOS 73 48     Troponin:   Recent Labs     06/21/20  1530 06/21/20  2332 06/22/20  0230   TROPONINI 0.05* 0.17* 0.22*     BNP: No results for input(s): BNP in the last 72 hours. Lipids: No results for input(s): CHOL, HDL in the last 72 hours. Invalid input(s): LDLCALCU, TRIGLYCERIDE  ABGs:    Recent Labs     06/21/20  2331   PHART 7.440   UQE0AGJ 35.5   PO2ART 69.6*   RYI3LFQ 24   BEART 0.2   [id number] 95   IEM5MVN 25       INR:   Recent Labs     06/21/20  1529 06/22/20  0408   INR 3.44* 3.73*     Lactate: No results for input(s): LACTATE in the last 72 hours. Cultures:  -----------------------------------------------------------------  RAD:   XR CHEST PORTABLE   Final Result      Bilateral airspace disease concerning for pneumonia; Viral pneumonia should be considered. EKG: ST, LVH     Echo: 1/2019  Left ventricle: The cavity size was normal. There was mild concentric hypertrophy. Systolic function was normal. The calculated ejection fraction was in the range of 60% to 65%. Wall motion was normal; there were no regional wall motion abnormalities. Doppler parameters are consistent with abnormal left ventricular relaxation (grade 1 diastolic dysfunction). Stroke volume: 104ml. Stroke volume/bsa: 56ml/m^2. - Aortic valve: A mechanical prosthesis was present.  There was moderate regurgitation. Mean gradient (S): 62mm Hg. Peak gradient (S): 107mm Hg. Valve area (VTI): 0.75cm^2. Indexed valve area (VTI): 0.4cm^2/m^2. - Mitral valve: There was mild regurgitation.  - Left atrium: The atrium was moderately dilated. - Right ventricle: The cavity size was mildly dilated. Wall thickness was normal.  - Right atrium: The atrium was moderately dilated. - Pulmonic valve: Mean gradient (S): 22mm Hg. Peak gradient (S): 40mm Hg. - Inferior vena cava: The vessel was normal in size; the respirophasic diameter changes were in the normal range (>= 50%); findings are consistent with normal central venous pressure. - Pulmonary arteries: PA peak pressure: 36mm Hg (S).    Micro:  Blood and Urine cultures pending     Assessment/Plan:     Severe Sepsis suspect 2/2 Pneumonia, COVID ruleout  - BL airspace dz on CXR, hypoxia requiring supp O2 up to 6L NC  - significant hypotension to MAP 55 after 2L IVF bolus, febrile to 106  - s/p 3.5L saline  - levophed titrate to MAP 60  - Vasopressin added overnight  - Cefepime 2g daily  - Pharmacy to dose Vanc  - droplet isolation, COVID pending  - Lactate 2.7-> 4.3-> 2.1     Acute on Chronic Diastolic Heart Failure  - Pro-BNP 5000, no reference lab prev.  - S/p sepsis fluid bolus  - Repeat Echo  - pressors as above    Acute on Chronic Anemia; Thrombocytopenia  - Hb 10.0-> 6.5  - transfusing 1U PRBCs, will f/u repeat H&H  - Platelets 117-> 494  - will f/u peripheral smear, haptoglobin   - fibrinogen 530, LD 1150, BUN 35 but stable. - CBC Now Q6h    NSTEMI, suspect Type 2  - Trop 0.05-> 0.17-> 0.22->  suspect 2/2 demand ischemia.  - s/p fluids and pressors    Constipation  - no BM in 7 days  - scheduled colace and senna daily     Hx Stage 4 SCC of tongue base  - on cisplatin  - completed 36/35 radiation treatments.    - Palliative Care consulted   - aggressive oral care for xerostomia     Hx Mechanical Aortic Valve in 2000  - Home warfarin to be dosed by pharmacy  - INR goal 2.5-3.5     Hx CAD s/p stent 1997  - cont home ASA     Hx CVA with residual Left hemiparesis, dysarthria, visual deficit  - cont home ASA     Hx GERD  - protonix IV daily     Hx BPH  - cont home flomax 0.4mg daily  - Alejandro      Hx Overactive bladder  - Alejandro   - Oxybutynin 10mg daily     Hx HLD  - lipitor 40mg     Code Status:Full Code  FEN: Diet NPO Effective Now  PPX:  Therapeutic heparin gtt  DISPO: ICU    Kulwinder Aguilera DO, PGY-1  06/22/20  7:16 AM    This patient has been staffed and discussed with Marah Gomez MD.

## 2020-06-22 NOTE — PROGRESS NOTES
4 Eyes Admission Assessment     I agree as the admission nurse that 2 RN's have performed a thorough Head to Toe Skin Assessment on the patient. ALL assessment sites listed below have been assessed on admission. Areas assessed by both nurses: MB & SK  [x]   Head, Face, and Ears    Scabbing around neck from chemo/radiation treatements  [x]   Shoulders, Back, and Chest  Bruising on abdomen, from previous lovenox injections  [x]   Arms, Elbows, and Hands   [x]   Coccyx, Sacrum, and Ischum  [x]   Legs, Feet, and Heels        Does the Patient have Skin Breakdown?   No         Lewis Prevention initiated:  Yes   Wound Care Orders initiated:  No      WOC nurse consulted for Pressure Injury (Stage 3,4, Unstageable, DTI, NWPT, and Complex wounds):  NA      Nurse 1 eSignature: Electronically signed by Rupa Brown RN on 6/22/20 at 4:21 AM EDT    **SHARE this note so that the co-signing nurse is able to place an eSignature**    Nurse 2 eSignature: Electronically signed by Eden Pelayo RN on 6/22/20 at 4:21 AM EDT

## 2020-06-22 NOTE — PROGRESS NOTES
Blood cultures drawn from red port on PICC, line that was present on admission, using sterile technique.

## 2020-06-23 LAB
ANION GAP SERPL CALCULATED.3IONS-SCNC: 8 MMOL/L (ref 3–16)
BASOPHILS ABSOLUTE: 0 K/UL (ref 0–0.2)
BASOPHILS RELATIVE PERCENT: 0.3 %
BLOOD BANK DISPENSE STATUS: NORMAL
BLOOD BANK PRODUCT CODE: NORMAL
BPU ID: NORMAL
BUN BLDV-MCNC: 21 MG/DL (ref 7–20)
CALCIUM SERPL-MCNC: 7.9 MG/DL (ref 8.3–10.6)
CHLORIDE BLD-SCNC: 105 MMOL/L (ref 99–110)
CO2: 23 MMOL/L (ref 21–32)
CREAT SERPL-MCNC: 0.6 MG/DL (ref 0.9–1.3)
DESCRIPTION BLOOD BANK: NORMAL
EOSINOPHILS ABSOLUTE: 0.1 K/UL (ref 0–0.6)
EOSINOPHILS RELATIVE PERCENT: 2.3 %
GFR AFRICAN AMERICAN: >60
GFR NON-AFRICAN AMERICAN: >60
GLUCOSE BLD-MCNC: 100 MG/DL (ref 70–99)
GLUCOSE BLD-MCNC: 117 MG/DL (ref 70–99)
HCT VFR BLD CALC: 21.6 % (ref 40.5–52.5)
HCT VFR BLD CALC: 23.1 % (ref 40.5–52.5)
HCT VFR BLD CALC: 23.8 % (ref 40.5–52.5)
HCT VFR BLD CALC: 25.3 % (ref 40.5–52.5)
HEMOGLOBIN: 7.3 G/DL (ref 13.5–17.5)
HEMOGLOBIN: 8.1 G/DL (ref 13.5–17.5)
HEMOGLOBIN: 8.3 G/DL (ref 13.5–17.5)
HEMOGLOBIN: 8.6 G/DL (ref 13.5–17.5)
INR BLD: 2.87 (ref 0.86–1.14)
LACTIC ACID: 0.8 MMOL/L (ref 0.4–2)
LYMPHOCYTES ABSOLUTE: 0.1 K/UL (ref 1–5.1)
LYMPHOCYTES RELATIVE PERCENT: 1.4 %
MCH RBC QN AUTO: 31.2 PG (ref 26–34)
MCHC RBC AUTO-ENTMCNC: 35.1 G/DL (ref 31–36)
MCV RBC AUTO: 88.9 FL (ref 80–100)
MONOCYTES ABSOLUTE: 0.2 K/UL (ref 0–1.3)
MONOCYTES RELATIVE PERCENT: 5.4 %
NEUTROPHILS ABSOLUTE: 3.6 K/UL (ref 1.7–7.7)
NEUTROPHILS RELATIVE PERCENT: 90.6 %
PDW BLD-RTO: 18.7 % (ref 12.4–15.4)
PERFORMED ON: ABNORMAL
PLATELET # BLD: 94 K/UL (ref 135–450)
PLATELET SLIDE REVIEW: ABNORMAL
PMV BLD AUTO: 7.6 FL (ref 5–10.5)
POTASSIUM REFLEX MAGNESIUM: 3.6 MMOL/L (ref 3.5–5.1)
PROTHROMBIN TIME: 33.7 SEC (ref 10–13.2)
RBC # BLD: 2.59 M/UL (ref 4.2–5.9)
SODIUM BLD-SCNC: 136 MMOL/L (ref 136–145)
TROPONIN: 0.24 NG/ML
TROPONIN: 0.26 NG/ML
VANCOMYCIN TROUGH: 10.3 UG/ML (ref 10–20)
WBC # BLD: 3.9 K/UL (ref 4–11)

## 2020-06-23 PROCEDURE — 99232 SBSQ HOSP IP/OBS MODERATE 35: CPT | Performed by: INTERNAL MEDICINE

## 2020-06-23 PROCEDURE — 2580000003 HC RX 258: Performed by: STUDENT IN AN ORGANIZED HEALTH CARE EDUCATION/TRAINING PROGRAM

## 2020-06-23 PROCEDURE — 2700000000 HC OXYGEN THERAPY PER DAY

## 2020-06-23 PROCEDURE — 85014 HEMATOCRIT: CPT

## 2020-06-23 PROCEDURE — 6370000000 HC RX 637 (ALT 250 FOR IP): Performed by: STUDENT IN AN ORGANIZED HEALTH CARE EDUCATION/TRAINING PROGRAM

## 2020-06-23 PROCEDURE — 80048 BASIC METABOLIC PNL TOTAL CA: CPT

## 2020-06-23 PROCEDURE — 85610 PROTHROMBIN TIME: CPT

## 2020-06-23 PROCEDURE — 6360000002 HC RX W HCPCS: Performed by: STUDENT IN AN ORGANIZED HEALTH CARE EDUCATION/TRAINING PROGRAM

## 2020-06-23 PROCEDURE — 80202 ASSAY OF VANCOMYCIN: CPT

## 2020-06-23 PROCEDURE — 93306 TTE W/DOPPLER COMPLETE: CPT

## 2020-06-23 PROCEDURE — 1200000000 HC SEMI PRIVATE

## 2020-06-23 PROCEDURE — 36415 COLL VENOUS BLD VENIPUNCTURE: CPT

## 2020-06-23 PROCEDURE — 2500000003 HC RX 250 WO HCPCS: Performed by: STUDENT IN AN ORGANIZED HEALTH CARE EDUCATION/TRAINING PROGRAM

## 2020-06-23 PROCEDURE — 85025 COMPLETE CBC W/AUTO DIFF WBC: CPT

## 2020-06-23 PROCEDURE — 83605 ASSAY OF LACTIC ACID: CPT

## 2020-06-23 PROCEDURE — 85018 HEMOGLOBIN: CPT

## 2020-06-23 PROCEDURE — 94761 N-INVAS EAR/PLS OXIMETRY MLT: CPT

## 2020-06-23 PROCEDURE — 84484 ASSAY OF TROPONIN QUANT: CPT

## 2020-06-23 RX ORDER — M-VIT,TX,IRON,MINS/CALC/FOLIC 27MG-0.4MG
1 TABLET ORAL DAILY
COMMUNITY

## 2020-06-23 RX ORDER — BISACODYL 10 MG
10 SUPPOSITORY, RECTAL RECTAL DAILY PRN
Status: DISCONTINUED | OUTPATIENT
Start: 2020-06-23 | End: 2020-07-01 | Stop reason: HOSPADM

## 2020-06-23 RX ORDER — SENNA PLUS 8.6 MG/1
2 TABLET ORAL EVERY EVENING
Status: ON HOLD | COMMUNITY
End: 2020-06-27 | Stop reason: HOSPADM

## 2020-06-23 RX ORDER — FLUORIDE TOOTHPASTE
15 TOOTHPASTE DENTAL 3 TIMES DAILY PRN
COMMUNITY

## 2020-06-23 RX ORDER — SENNA PLUS 8.6 MG/1
2 TABLET ORAL PRN
COMMUNITY

## 2020-06-23 RX ORDER — OXYBUTYNIN CHLORIDE 5 MG/1
5 TABLET ORAL 2 TIMES DAILY
Status: DISCONTINUED | OUTPATIENT
Start: 2020-06-23 | End: 2020-07-01 | Stop reason: HOSPADM

## 2020-06-23 RX ORDER — TETRAHYDROZOLINE HCL 0.05 %
1 DROPS OPHTHALMIC (EYE) 3 TIMES DAILY PRN
Status: DISCONTINUED | OUTPATIENT
Start: 2020-06-23 | End: 2020-06-23

## 2020-06-23 RX ORDER — 0.9 % SODIUM CHLORIDE 0.9 %
20 INTRAVENOUS SOLUTION INTRAVENOUS ONCE
Status: DISCONTINUED | OUTPATIENT
Start: 2020-06-23 | End: 2020-07-01 | Stop reason: HOSPADM

## 2020-06-23 RX ORDER — ACETAMINOPHEN 325 MG/1
650 TABLET ORAL EVERY 6 HOURS PRN
COMMUNITY

## 2020-06-23 RX ORDER — ASPIRIN 81 MG/1
81 TABLET, CHEWABLE ORAL DAILY
COMMUNITY

## 2020-06-23 RX ADMIN — Medication 20 MG: at 08:05

## 2020-06-23 RX ADMIN — BISACODYL 10 MG: 10 SUPPOSITORY RECTAL at 05:32

## 2020-06-23 RX ADMIN — POLYETHYLENE GLYCOL (3350) 17 G: 17 POWDER, FOR SOLUTION ORAL at 08:06

## 2020-06-23 RX ADMIN — VANCOMYCIN HYDROCHLORIDE 1000 MG: 10 INJECTION, POWDER, LYOPHILIZED, FOR SOLUTION INTRAVENOUS at 23:03

## 2020-06-23 RX ADMIN — CEFEPIME 2 G: 2 INJECTION, POWDER, FOR SOLUTION INTRAVENOUS at 03:49

## 2020-06-23 RX ADMIN — OXYBUTYNIN CHLORIDE 5 MG: 5 TABLET ORAL at 08:05

## 2020-06-23 RX ADMIN — Medication 6 MG: at 00:50

## 2020-06-23 RX ADMIN — DOCUSATE SODIUM 50 MG AND SENNOSIDES 8.6 MG 1 TABLET: 8.6; 5 TABLET, FILM COATED ORAL at 08:05

## 2020-06-23 RX ADMIN — DOCUSATE SODIUM 50 MG AND SENNOSIDES 8.6 MG 1 TABLET: 8.6; 5 TABLET, FILM COATED ORAL at 21:59

## 2020-06-23 RX ADMIN — Medication 20 MG: at 22:00

## 2020-06-23 RX ADMIN — Medication 6 MG: at 22:01

## 2020-06-23 RX ADMIN — TAMSULOSIN HYDROCHLORIDE 0.4 MG: 0.4 CAPSULE ORAL at 08:05

## 2020-06-23 RX ADMIN — Medication 10 ML: at 21:59

## 2020-06-23 RX ADMIN — SODIUM CHLORIDE: 9 INJECTION, SOLUTION INTRAVENOUS at 21:43

## 2020-06-23 RX ADMIN — OXYBUTYNIN CHLORIDE 5 MG: 5 TABLET ORAL at 21:59

## 2020-06-23 RX ADMIN — Medication 5 ML: at 12:34

## 2020-06-23 RX ADMIN — ATORVASTATIN CALCIUM 40 MG: 40 TABLET, FILM COATED ORAL at 08:05

## 2020-06-23 RX ADMIN — CEFEPIME 2 G: 2 INJECTION, POWDER, FOR SOLUTION INTRAVENOUS at 12:34

## 2020-06-23 RX ADMIN — SERTRALINE HYDROCHLORIDE 100 MG: 100 TABLET ORAL at 08:05

## 2020-06-23 RX ADMIN — VANCOMYCIN HYDROCHLORIDE 1000 MG: 10 INJECTION, POWDER, LYOPHILIZED, FOR SOLUTION INTRAVENOUS at 10:37

## 2020-06-23 RX ADMIN — CEFEPIME 2 G: 2 INJECTION, POWDER, FOR SOLUTION INTRAVENOUS at 22:00

## 2020-06-23 RX ADMIN — ASPIRIN 81 MG: 81 TABLET, COATED ORAL at 08:05

## 2020-06-23 RX ADMIN — OXYBUTYNIN CHLORIDE 5 MG: 5 TABLET ORAL at 03:41

## 2020-06-23 RX ADMIN — SODIUM CHLORIDE: 9 INJECTION, SOLUTION INTRAVENOUS at 08:21

## 2020-06-23 RX ADMIN — DEXTRAN 70, AND HYPROMELLOSE 2910 1 DROP: 1; 3 SOLUTION/ DROPS OPHTHALMIC at 05:32

## 2020-06-23 RX ADMIN — Medication 5 ML: at 19:58

## 2020-06-23 RX ADMIN — ACETAMINOPHEN 650 MG: 325 TABLET ORAL at 08:05

## 2020-06-23 ASSESSMENT — PAIN SCALES - GENERAL
PAINLEVEL_OUTOF10: 0
PAINLEVEL_OUTOF10: 5
PAINLEVEL_OUTOF10: 0
PAINLEVEL_OUTOF10: 0

## 2020-06-23 NOTE — PLAN OF CARE
Problem: Falls - Risk of:  Goal: Will remain free from falls  Description: Will remain free from falls  Outcome: Ongoing  Goal: Absence of physical injury  Description: Absence of physical injury  Outcome: Ongoing     Problem: Infection:  Goal: Will remain free from infection  Description: Will remain free from infection  Outcome: Ongoing     Problem: Safety:  Goal: Free from accidental physical injury  Description: Free from accidental physical injury  Outcome: Ongoing  Goal: Free from intentional harm  Description: Free from intentional harm  Outcome: Ongoing     Problem: Pain:  Goal: Patient's pain/discomfort is manageable  Description: Patient's pain/discomfort is manageable  Outcome: Ongoing  Goal: Pain level will decrease  Description: Pain level will decrease  Outcome: Ongoing  Goal: Control of acute pain  Description: Control of acute pain  Outcome: Ongoing  Goal: Control of chronic pain  Description: Control of chronic pain  Outcome: Ongoing     Problem: Discharge Planning:  Goal: Patients continuum of care needs are met  Description: Patients continuum of care needs are met  Outcome: Ongoing     Problem: Nutrition  Goal: Optimal nutrition therapy  Outcome: Ongoing     Problem: Infection - Central Venous Catheter-Associated Bloodstream Infection:  Goal: Will show no infection signs and symptoms  Description: Will show no infection signs and symptoms  Outcome: Ongoing

## 2020-06-23 NOTE — PROGRESS NOTES
Oral Daily    tamsulosin  0.4 mg Oral Daily    sodium chloride flush  10 mL Intravenous 2 times per day    polyethylene glycol  17 g Oral Daily    sennosides-docusate sodium  1 tablet Oral BID    famotidine (PEPCID) injection  20 mg Intravenous BID     Continuous Infusions:   norepinephrine Stopped (06/22/20 1601)    vasopressin (Septic Shock) infusion Stopped (06/22/20 0530)    sodium chloride 125 mL/hr at 06/22/20 2105     PRN Meds:bisacodyl, dextran 70-hypromellose, magic (miracle) mouthwash, melatonin, traMADol, sodium chloride flush, acetaminophen **OR** acetaminophen, potassium chloride, magnesium sulfate, ondansetron    Objective:   Vitals:   T-max:  Patient Vitals for the past 8 hrs:   BP Temp Temp src Pulse Resp SpO2 Weight   06/23/20 0749 -- 98.5 °F (36.9 °C) Oral -- -- -- --   06/23/20 0728 -- -- -- -- (!) 32 96 % --   06/23/20 0600 (!) 115/51 99.9 °F (37.7 °C) -- 91 21 -- --   06/23/20 0500 -- -- -- 84 -- 97 % 142 lb 10.2 oz (64.7 kg)   06/23/20 0400 (!) 107/48 100.2 °F (37.9 °C) Bladder 83 25 96 % --   06/23/20 0300 (!) 124/44 -- -- 84 (!) 34 96 % --   06/23/20 0200 (!) 109/40 -- -- 91 19 95 % --   06/23/20 0115 (!) 112/50 -- -- 86 (!) 31 99 % --   06/23/20 0100 (!) 129/44 -- -- 81 (!) 36 93 % --   06/23/20 0045 -- -- -- -- -- 96 % --   06/23/20 0000 (!) 113/47 100.2 °F (37.9 °C) Bladder 79 (!) 33 95 % --       Intake/Output Summary (Last 24 hours) at 6/23/2020 0756  Last data filed at 6/23/2020 0600  Gross per 24 hour   Intake 4438 ml   Output 1585 ml   Net 2853 ml       Access:   -Peripheral Access Day#:2  -Central Access Day #: PICC 2 lumen R Basilic PoA                                                    -Arterial line Day#:                                  Alejandro Day#:   NGT Day#: PEG tube PoA                                            ETT Day#:  Vent Settings:    / / /       Physical Exam  Vitals signs and nursing note reviewed. Constitutional:       Appearance: He is ill-appearing.  He is not toxic-appearing. Comments: Cachectic, pale, hearing aids in place   HENT:      Nose: Nose normal.      Comments: Nasal cannula in place     Mouth/Throat:      Mouth: Mucous membranes are dry. Comments: Caked with food waste, biofilm. halitosis  Eyes:      General: No scleral icterus. Conjunctiva/sclera: Conjunctivae normal.      Pupils: Pupils are equal, round, and reactive to light. Neck:      Musculoskeletal: Normal range of motion. Cardiovascular:      Rate and Rhythm: Normal rate and regular rhythm. Pulses: Normal pulses. Heart sounds: Murmur (4/6 pansystolic loudest at left sternal border) present. Pulmonary:      Effort: Pulmonary effort is normal. No respiratory distress. Breath sounds: Wheezing (diffuse BL) present. Abdominal:      General: Abdomen is flat. There is no distension. Palpations: Abdomen is soft. Tenderness: There is no abdominal tenderness. There is no guarding. Comments: Scaphoid, PEG tube in place   Musculoskeletal: Normal range of motion. General: No swelling. Right lower leg: No edema. Left lower leg: No edema. Comments: L arm flexion contracture   Skin:     Capillary Refill: Capillary refill takes less than 2 seconds. Neurological:      General: No focal deficit present. Mental Status: He is alert and oriented to person, place, and time. Mental status is at baseline. Cranial Nerves: No cranial nerve deficit. Sensory: No sensory deficit. Motor: Weakness (left hemiplegia) present. Psychiatric:         Mood and Affect: Mood normal.       LABS:    CBC:   Recent Labs     06/22/20  0408 06/22/20  0557  06/22/20  2045 06/23/20  0003 06/23/20  0401   WBC 8.4 6.4  --   --   --  3.9*   HGB 6.9* 6.5*   < > 7.7* 8.3* 8.1*   HCT 19.5* 18.9*   < > 22.5* 23.8* 23.1*    134*  --   --   --  94*   MCV 92.0 93.1  --   --   --  88.9    < > = values in this interval not displayed.      Renal:    Recent there were no regional wall motion abnormalities. Doppler parameters are consistent with abnormal left ventricular relaxation (grade 1 diastolic dysfunction). Stroke volume: 104ml. Stroke volume/bsa: 56ml/m^2. - Aortic valve: A mechanical prosthesis was present. There was moderate regurgitation. Mean gradient (S): 62mm Hg. Peak gradient (S): 107mm Hg. Valve area (VTI): 0.75cm^2. Indexed valve area (VTI): 0.4cm^2/m^2. - Mitral valve: There was mild regurgitation.  - Left atrium: The atrium was moderately dilated. - Right ventricle: The cavity size was mildly dilated. Wall thickness was normal.  - Right atrium: The atrium was moderately dilated. - Pulmonic valve: Mean gradient (S): 22mm Hg. Peak gradient (S): 40mm Hg. - Inferior vena cava: The vessel was normal in size; the respirophasic diameter changes were in the normal range (>= 50%); findings are consistent with normal central venous pressure. - Pulmonary arteries: PA peak pressure: 36mm Hg (S).    Micro:  Blood and Urine cultures pending     Assessment/Plan:     Severe Sepsis suspect 2/2 Pneumonia, COVID negative (IMPROVING)  - BL airspace dz on CXR, hypoxia requiring supp O2 up to 6L NC  - significant hypotension to MAP 55 after 2L IVF bolus, febrile to 106  - s/p 3.5L saline  - Cefepime 2g daily  - Pharmacy to dose Vanc  - off pressors  - droplet isolation, COVID pending  - Lactate 2.7-> 4.3-> 2.1-> 1  - blood Cx 2/2 positive for Staph Aureus  - repeat Blood Cx from PICC line     Acute on Chronic Diastolic Heart Failure  - Pro-BNP 5000, no reference lab prev.  - S/p sepsis fluid bolus  - Repeat Echo  - pressors as above    Acute on Chronic Anemia; Thrombocytopenia  - Hb 10.0-> 6.5  - transfusing 1U PRBCs, will f/u repeat H&H  - Platelets 610-> 318  - will f/u peripheral smear, haptoglobin   - fibrinogen 530, LD 1150, BUN 35 but stable. - CBC Now Q6h    NSTEMI, suspect Type 2  - Trop 0.05-> 0.17-> 0.22-> 0.25-> stable; suspect 2/2 demand ischemia.   - s/p fluids and pressors    Constipation  - no BM in 7 days  - scheduled colace and senna daily     Hx Stage 4 SCC of tongue base  - on cisplatin  - completed 36/35 radiation treatments. - Palliative Care consulted   - aggressive oral care for xerostomia     Hx Mechanical Aortic Valve in 2000  - Home warfarin to be dosed by pharmacy  - INR goal 2.5-3.5     Hx CAD s/p stent 1997  - cont home ASA     Hx CVA with residual Left hemiparesis, dysarthria, visual deficit  - cont home ASA     Hx GERD  - protonix IV daily     Hx BPH  - cont home flomax 0.4mg daily  - Alejandro      Hx Overactive bladder  - Alejandro   - Oxybutynin 10mg daily     Hx HLD  - lipitor 40mg     Code Status:Full Code  FEN: DIET TUBE FEED CONTINUOUS/CYCLIC NPO; STANDARD WITH FIBER (jevity 1.2); Gastrostomy; Continuous; 25; 50; 24  Dietary Nutrition Supplements: Protein Modular  PPX:  Therapeutic heparin gtt  DISPO: ICU    Breezy Monique DO, PGY-1  06/23/20  7:56 AM    This patient has been staffed and discussed with Rubina Valenzuela MD.     Patient seen, examined and discussed with the resident and I agree with the assessment and plan.     Weaned off pressors yesterday morning. Much more alert today. Grew Staph in his blood culture. On appropriate antibiotics vancomycin and cefepime. Will narrow as we get further culture and sensitivity data  Covid negative yesterday. H/h continues to trend down, without overt bleeding. May be secondary to resuscitative fluids or 2 consumption from his sepsis. Needs continued work-up  Clinically much improved today and will transfer to a telemetry floor today  Will restart feeds via his PEG.      Annalisa Wilson MD

## 2020-06-23 NOTE — CONSULTS
Consult Note      Jose Mcclelland  1960    Consultant: Lisbeth Lamas  Reason for Consult:  Anemia  Requesting Physician:  Terry Ortega    CHIEF COMPLAINT:  fever    History Obtained From:  patient, electronic medical record    HISTORY OF PRESENT ILLNESS:                The patient is a 64 y.o. male with significant past medical history of SCC on chemo who presents with fever and hypotension at SNF. Found to have PNA and sepsis and anemia. No melena. No hematochezia. No abdominal pain. Complain of constipation. Past Medical History:        Diagnosis Date    Adult failure to thrive     Dysphagia, unspecified     Encounter for attention to gastrostomy ([facility] [address]     GERD without esophagitis     Hyperlipidemia     Hypertension     Legal blindness, as defined in [address] term (current) use of anticoagulants     Malignant neoplasm of oropharynx, unspecified (HCC)     Muscle weakness (generalized)     Overactive bladder     Presence of prosthetic heart valve     Secondary and unspecified malignant neoplasm of lymph nodes of head, face and neck (HCC)     Unspecified severe protein-calorie malnutrition (HCC)     Unsteadiness on feet      Past Surgical History:    No past surgical history on file.   Medications at Home:  Medications Prior to Admission: carboxymethylcellulose (REFRESH TEARS) 0.5 % SOLN ophthalmic solution, 2 drops 4 times daily  Aspirin Buf,CaCarb-MgCarb-MgO, 81 MG TABS, Take 81 mg by mouth daily  atenolol (TENORMIN) 25 MG tablet, TAKE 1 TABLET EVERY DAY  atorvastatin (LIPITOR) 40 MG tablet, Take 40 mg by mouth daily  ondansetron (ZOFRAN) 4 MG/5ML solution, Take 8 mg by mouth every 8 hours as needed  oxybutynin (DITROPAN-XL) 10 MG extended release tablet, Take 10 mg by mouth daily  pantoprazole (PROTONIX) 40 MG tablet, Take 40 mg by mouth daily  sertraline (ZOLOFT) 100 MG tablet, TAKE 1 TABLET EVERY DAY  tamsulosin (FLOMAX) 0.4 MG capsule, TAKE 1 CAPSULE BY MOUTH NIGHTLY AT BEDTIME.  traMADol dextrose 5 % 100 mL infusion, 0.04 Units/min, Intravenous, Continuous  [COMPLETED] 0.9 % sodium chloride bolus, 500 mL, Intravenous, Once **FOLLOWED BY** 0.9 % sodium chloride infusion, , Intravenous, Continuous  Allergies:  Patient has no known allergies. Social History:    TOBACCO:   reports that he has never smoked. He has never used smokeless tobacco.  ETOH:   has no history on file for alcohol. DRUGS:   has no history on file for drug. Family History:   No family history on file. REVIEW OF SYSTEMS:    A comprehensive 12 point review of systems is negative except as documented above. PHYSICAL EXAM:      Vitals:    BP (!) 115/51   Pulse 91   Temp 98.5 °F (36.9 °C) (Oral)   Resp (!) 32   Ht 5' 10\" (1.778 m)   Wt 142 lb 10.2 oz (64.7 kg)   SpO2 96%   BMI 20.47 kg/m²     General: No acute distress  HEENT: PERRL, EOMI, oral mucosa moist and intact, no sclericterus; Neck: no thyromegaly; has neck scars  Lymphatic:no obvious lymphadenopathy  Heart: no m/r/g; +s1/s2 rrr  Lungs: rales bilaterally  Abdomen: soft, nt, nd +BS +PEG c/d/i  Extremities: 2+pulses, + edema  Skin: no rashes or lesions  Neuro: a&o x 3; difficult speaking  DATA:    Recent Labs     06/22/20  0408 06/22/20  0557  06/22/20  2045 06/23/20  0003 06/23/20  0401   WBC 8.4 6.4  --   --   --  3.9*   HGB 6.9* 6.5*   < > 7.7* 8.3* 8.1*   HCT 19.5* 18.9*   < > 22.5* 23.8* 23.1*   MCV 92.0 93.1  --   --   --  88.9    134*  --   --   --  94*    < > = values in this interval not displayed. Recent Labs     06/21/20  1530 06/22/20  0408 06/23/20  0546    137 136   K 4.1 4.2 3.6    104 105   CO2 24 23 23   BUN 32* 35* 21*   CREATININE 1.3 0.8* 0.6*     Recent Labs     06/21/20  1530 06/22/20  0625   AST 72* 52*   ALT 44* 35   BILIDIR  --  0.4*   BILITOT 1.7* 1.2*   ALKPHOS 73 48     No results for input(s): LIPASE, AMYLASE in the last 72 hours.   Recent Labs     06/21/20  1529 06/21/20  1530 06/22/20  0408 06/22/20  4616 06/23/20  0546   PROT  --  5.9*  --  4.7*  --    INR 3.44*  --  3.73*  --  2.87*     No results for input(s): PTT in the last 72 hours. No results for input(s): OCCULTBLD in the last 72 hours. Imaging: CT    IMPRESSION/RECOMMENDATIONS:      PNA  Sepsis  Anemia  FOBT +    Patient has no overt bleeding. Would be quite rare to drop 3-4 units of blood without overt bleeding. He does have stool in rectum with proctitis. This could certainly cause FOBT +. Would recommend cathartics. Anemia multifactorial but most consistent with a hemolytic/consumptive process given the low haptoglobin, increase LDH, increased AST, elevated bilirubin and dropping platelets  Discussed with patient and primary team. Will hold off any scopes at this point. Please call back with change in clinical status or other questions/concerns. Thank you for allowing me to participate in [facility]. Camila Otero.  Wanda Oliver MD

## 2020-06-23 NOTE — CARE COORDINATION
Case Management Assessment           Initial Evaluation                Date / Time of Evaluation: 6/23/2020 2:21 PM                 Assessment Completed by: Amita Muller    Patient Name: Elizabeth Marquis     YOB: 1960  Diagnosis: Severe sepsis ([facility] [address] [A41.9, R65.20]  Severe sepsis ([facility] [address] [A41.9, R65.20]     Date / Time: 6/21/2020  3:01 PM    Patient Admission Status: Inpatient    If patient is discharged prior to next notation, then this note serves as note for discharge by case management. Current PCP: No primary care provider on file. Clinic Patient: No    Chart Reviewed: Yes  Patient/ Family Interviewed: Yes    Initial assessment completed at bedside with: Patient     Hospitalization in the last 30 days: No    Emergency Contacts:  Extended Emergency Contact Information  Primary Emergency Contact: Jennifer Campbell  Mobile Phone: [phone]  Relation: Child   needed? No  Secondary Emergency Contact: Mira Meraz, [address] [facility] Phone: [phone]  Mobile Phone: [phone]  Relation: Brother/Sister    Advance Directives:   Code Status: Full [address]: No     Financial:  Payor: Sin Colbert / Plan: [employer] PLUS HMO / Product Type: *No Product type* /     Pre-cert required for SNF: No    Pharmacy:    [facility] [address] [place] [id number]  Phone: [phone] Fax: [phone] Karlos Castaneda, [address] [place] [address] [phone] [facility] [phone]  [address] [place] [address]  Phone: [phone] Fax: [phone]      Potential assistance Purchasing Medications: Potential Assistance Purchasing Medications: No  Does Patient want to participate in local refill/ meds to beds program?: No    Meds To Beds General Rules:  1.  Can ONLY be done pneumonia. COVID test came back negative. Weaning 02. Patient communicated through white board. Patient is a resident of [facility], but was at [facility] for rehab and care. He reported he was scheduled to discharge from [facility], prior to health issues brining home to [facility]. His goal is to return to [facility]. He reports being at baseline and that his goal was to learn how to feed himself and he feels confident with that at this time. SW stated he would reach out to [facility] to discuss this option and follow up with patient. KEDAR placed call to [facility] [phone]. KEDAR LVM and requested a return call to discuss patient's ability to return at 2:28 pm.    SW awaiting response. The Plan for Transition of Care is related to the following treatment goals Severe sepsis ([facility] [address] [A41.9, R65.20]  Severe sepsis ([facility] [address] [A41.9, R65.20]      The Patient and/or patient representative Patient  was provided with a choice of provider and agrees with the discharge plan Yes    Freedom of choice list was provided with basic dialogue that supports the patient's individualized plan of care/goals and shares the quality data associated with the providers.  Yes    Care Transition patient: No    STACEY Almanzar  The [address] [place] [address] ICU   Case Management Department  Ph: [phone]

## 2020-06-23 NOTE — H&P
Chart reviewed, pt adm to ICU, sepsis, pneumonia. Rapid COVID test negative, repeat test pending. In order to preseve PPE pt not seen directly, d/w ICU service, agree with management, will continue to follow.      Lawerence Modest

## 2020-06-23 NOTE — PROGRESS NOTES
Clinical Pharmacy Progress Note  Medication History     Admit Date: 6/21/2020    List of of current medications patient is taking is complete. Home Medication list in EPIC updated to reflect changes noted below. Source of information: medication list from [facility]    Patient's home pharmacy: n/a     Changes made to medication list:   Medications removed: (include reason, ex: therapy completed, inactive medication)   Aspirin calcium, magnesium   Medications added:    Acetaminophen (Tylenol)   Biotene Artificial saliva    Cholecalciferol (Vitamin D)    Docusate (Colace)   Enoxaparin (Lovenox) 100 mg: Directions were to inject 100 mg BID for prophylaxis until INR reaches 2.0, then d/c order.  Promethazine   Senna    Multivitamin   Medication doses adjusted:      Other notes:    Aspirin: changed to chewable aspirin 81 mg       Please call with any questions.   Theora Lanes, PharmD  PGY1 Pharmacy Resident   Wireless: [phone]  6/23/2020 4:17 PM

## 2020-06-23 NOTE — PLAN OF CARE
Problem: Fluid Volume - Imbalance:  Goal: Absence of imbalanced fluid volume signs and symptoms  Description: Absence of imbalanced fluid volume signs and symptoms  Note: VSS. Hemoglobin dropped. Pt now receiving blood transfusion. Continuing with maintanence IVF. No rectal bleeding noted. Will cont to monitor.

## 2020-06-23 NOTE — PROGRESS NOTES
Mouth: Mucous membranes are dry. Comments: Caked with food waste, biofilm. halitosis  Eyes:      General: No scleral icterus. Conjunctiva/sclera: Conjunctivae normal.      Pupils: Pupils are equal, round, and reactive to light. Neck:      Musculoskeletal: Normal range of motion. Cardiovascular:      Rate and Rhythm: Normal rate and regular rhythm. Pulses: Normal pulses. Heart sounds: Murmur (4/6 pansystolic loudest at left sternal border) present. Pulmonary:      Effort: Pulmonary effort is normal. No respiratory distress. Breath sounds: Wheezing (diffuse BL) present. Abdominal:      General: Abdomen is flat. There is no distension. Palpations: Abdomen is soft. Tenderness: There is no abdominal tenderness. There is no guarding. Comments: Scaphoid, PEG tube in place   Musculoskeletal: Normal range of motion. General: No swelling. Right lower leg: No edema. Left lower leg: No edema. Comments: L arm flexion contracture   Skin:     Capillary Refill: Capillary refill takes less than 2 seconds. Neurological:      General: No focal deficit present. Mental Status: He is alert and oriented to person, place, and time. Mental status is at baseline. Cranial Nerves: No cranial nerve deficit. Sensory: No sensory deficit. Motor: Weakness (left hemiplegia) present. Psychiatric:         Mood and Affect: Mood normal.       LABS:  Recent Labs     06/22/20  0408 06/22/20  0557  06/22/20  2045 06/23/20  0003 06/23/20  0401   WBC 8.4 6.4  --   --   --  3.9*   HGB 6.9* 6.5*   < > 7.7* 8.3* 8.1*   HCT 19.5* 18.9*   < > 22.5* 23.8* 23.1*    134*  --   --   --  94*    < > = values in this interval not displayed.                                                                     Recent Labs     06/21/20  1530 06/22/20  0408 06/23/20  0546    137 136   K 4.1 4.2 3.6    104 105   CO2 24 23 23   BUN 32* 35* 21*   CREATININE 1.3 0.8*

## 2020-06-23 NOTE — PLAN OF CARE
Problem: Falls - Risk of:  Goal: Will remain free from falls  Description: Will remain free from falls  6/23/2020 1005 by Jani Alexander RN  Note: No attempts to get out of bed without assistance. Nonskid socks on. Bed locked and in lowest position. Side rails up x3. Exit alarm in use. Call light in reach. Remains free from injury. Will cont to monitor safety.

## 2020-06-23 NOTE — PLAN OF CARE
Problem: Pain:  Goal: Patient's pain/discomfort is manageable  Description: Patient's pain/discomfort is manageable  6/23/2020 1648 by Mirella Hoskins RN  Note: No complaints of pain t/o shift. Instructed to call for discomfort. Will cont to monitor comfort level.

## 2020-06-23 NOTE — PROGRESS NOTES
Pt A/Ox4, VSS, afebrile. Pt communicating with board. C/o pain in neck from radiation, tylenol given PRN. Pt constipated, scheduled miralax and senna given. Order to restart TF, jevity at 25/hr. IVF infusing at 125/hr. Alejandro removed per nt RN, waiting for pt to void. Will cont to monitor  status.

## 2020-06-24 LAB
ANION GAP SERPL CALCULATED.3IONS-SCNC: 5 MMOL/L (ref 3–16)
ANTI-XA UNFRAC HEPARIN: 0.11 IU/ML (ref 0.3–0.7)
APTT: 33.3 SEC (ref 24.2–36.2)
BASOPHILS ABSOLUTE: 0 K/UL (ref 0–0.2)
BASOPHILS RELATIVE PERCENT: 0.2 %
BLOOD CULTURE, ROUTINE: ABNORMAL
BLOOD CULTURE, ROUTINE: ABNORMAL
BUN BLDV-MCNC: 14 MG/DL (ref 7–20)
CALCIUM SERPL-MCNC: 7.9 MG/DL (ref 8.3–10.6)
CHLORIDE BLD-SCNC: 106 MMOL/L (ref 99–110)
CO2: 25 MMOL/L (ref 21–32)
CREAT SERPL-MCNC: 0.5 MG/DL (ref 0.9–1.3)
CULTURE, BLOOD 2: ABNORMAL
EOSINOPHILS ABSOLUTE: 0.1 K/UL (ref 0–0.6)
EOSINOPHILS RELATIVE PERCENT: 3.2 %
GFR AFRICAN AMERICAN: >60
GFR NON-AFRICAN AMERICAN: >60
GLUCOSE BLD-MCNC: 131 MG/DL (ref 70–99)
HCT VFR BLD CALC: 25.1 % (ref 40.5–52.5)
HCT VFR BLD CALC: 25.4 % (ref 40.5–52.5)
HEMOGLOBIN: 8.6 G/DL (ref 13.5–17.5)
HEMOGLOBIN: 8.7 G/DL (ref 13.5–17.5)
INR BLD: 2.02 (ref 0.86–1.14)
LYMPHOCYTES ABSOLUTE: 0.1 K/UL (ref 1–5.1)
LYMPHOCYTES RELATIVE PERCENT: 2.2 %
MAGNESIUM: 1.7 MG/DL (ref 1.8–2.4)
MCH RBC QN AUTO: 30.3 PG (ref 26–34)
MCHC RBC AUTO-ENTMCNC: 34.2 G/DL (ref 31–36)
MCV RBC AUTO: 88.3 FL (ref 80–100)
MONOCYTES ABSOLUTE: 0.3 K/UL (ref 0–1.3)
MONOCYTES RELATIVE PERCENT: 8.1 %
NEUTROPHILS ABSOLUTE: 3.4 K/UL (ref 1.7–7.7)
NEUTROPHILS RELATIVE PERCENT: 86.3 %
ORGANISM: ABNORMAL
PDW BLD-RTO: 18.5 % (ref 12.4–15.4)
PLATELET # BLD: 83 K/UL (ref 135–450)
PMV BLD AUTO: 7.7 FL (ref 5–10.5)
POTASSIUM REFLEX MAGNESIUM: 3.4 MMOL/L (ref 3.5–5.1)
PROTHROMBIN TIME: 23.6 SEC (ref 10–13.2)
RBC # BLD: 2.85 M/UL (ref 4.2–5.9)
SODIUM BLD-SCNC: 136 MMOL/L (ref 136–145)
WBC # BLD: 3.9 K/UL (ref 4–11)

## 2020-06-24 PROCEDURE — 2580000003 HC RX 258: Performed by: STUDENT IN AN ORGANIZED HEALTH CARE EDUCATION/TRAINING PROGRAM

## 2020-06-24 PROCEDURE — 85025 COMPLETE CBC W/AUTO DIFF WBC: CPT

## 2020-06-24 PROCEDURE — 6370000000 HC RX 637 (ALT 250 FOR IP): Performed by: STUDENT IN AN ORGANIZED HEALTH CARE EDUCATION/TRAINING PROGRAM

## 2020-06-24 PROCEDURE — 6360000002 HC RX W HCPCS: Performed by: INTERNAL MEDICINE

## 2020-06-24 PROCEDURE — 85018 HEMOGLOBIN: CPT

## 2020-06-24 PROCEDURE — 1200000000 HC SEMI PRIVATE

## 2020-06-24 PROCEDURE — 2500000003 HC RX 250 WO HCPCS: Performed by: STUDENT IN AN ORGANIZED HEALTH CARE EDUCATION/TRAINING PROGRAM

## 2020-06-24 PROCEDURE — 85610 PROTHROMBIN TIME: CPT

## 2020-06-24 PROCEDURE — 6360000002 HC RX W HCPCS: Performed by: STUDENT IN AN ORGANIZED HEALTH CARE EDUCATION/TRAINING PROGRAM

## 2020-06-24 PROCEDURE — 99232 SBSQ HOSP IP/OBS MODERATE 35: CPT | Performed by: NURSE PRACTITIONER

## 2020-06-24 PROCEDURE — 83735 ASSAY OF MAGNESIUM: CPT

## 2020-06-24 PROCEDURE — 2700000000 HC OXYGEN THERAPY PER DAY

## 2020-06-24 PROCEDURE — 36415 COLL VENOUS BLD VENIPUNCTURE: CPT

## 2020-06-24 PROCEDURE — 94761 N-INVAS EAR/PLS OXIMETRY MLT: CPT

## 2020-06-24 PROCEDURE — 85520 HEPARIN ASSAY: CPT

## 2020-06-24 PROCEDURE — 80048 BASIC METABOLIC PNL TOTAL CA: CPT

## 2020-06-24 PROCEDURE — 85730 THROMBOPLASTIN TIME PARTIAL: CPT

## 2020-06-24 PROCEDURE — 85014 HEMATOCRIT: CPT

## 2020-06-24 RX ORDER — HEPARIN SODIUM 5000 [USP'U]/ML
80 INJECTION, SOLUTION INTRAVENOUS; SUBCUTANEOUS PRN
Status: DISCONTINUED | OUTPATIENT
Start: 2020-06-24 | End: 2020-07-01 | Stop reason: HOSPADM

## 2020-06-24 RX ORDER — FAMOTIDINE 20 MG/1
20 TABLET, FILM COATED ORAL 2 TIMES DAILY
Status: DISCONTINUED | OUTPATIENT
Start: 2020-06-24 | End: 2020-07-01 | Stop reason: HOSPADM

## 2020-06-24 RX ORDER — HEPARIN SODIUM 5000 [USP'U]/ML
40 INJECTION, SOLUTION INTRAVENOUS; SUBCUTANEOUS PRN
Status: DISCONTINUED | OUTPATIENT
Start: 2020-06-24 | End: 2020-07-01 | Stop reason: HOSPADM

## 2020-06-24 RX ORDER — POTASSIUM CHLORIDE 29.8 MG/ML
20 INJECTION INTRAVENOUS PRN
Status: DISCONTINUED | OUTPATIENT
Start: 2020-06-24 | End: 2020-07-01 | Stop reason: HOSPADM

## 2020-06-24 RX ORDER — HEPARIN SODIUM 10000 [USP'U]/100ML
26 INJECTION, SOLUTION INTRAVENOUS CONTINUOUS
Status: DISCONTINUED | OUTPATIENT
Start: 2020-06-24 | End: 2020-07-01 | Stop reason: HOSPADM

## 2020-06-24 RX ORDER — HEPARIN SODIUM 5000 [USP'U]/ML
80 INJECTION, SOLUTION INTRAVENOUS; SUBCUTANEOUS ONCE
Status: COMPLETED | OUTPATIENT
Start: 2020-06-24 | End: 2020-06-24

## 2020-06-24 RX ADMIN — HEPARIN SODIUM 18 UNITS/KG/HR: 10000 INJECTION, SOLUTION INTRAVENOUS at 14:11

## 2020-06-24 RX ADMIN — CEFEPIME 2 G: 2 INJECTION, POWDER, FOR SOLUTION INTRAVENOUS at 20:00

## 2020-06-24 RX ADMIN — CEFEPIME 2 G: 2 INJECTION, POWDER, FOR SOLUTION INTRAVENOUS at 11:36

## 2020-06-24 RX ADMIN — Medication 20 MG: at 08:19

## 2020-06-24 RX ADMIN — VANCOMYCIN HYDROCHLORIDE 1000 MG: 10 INJECTION, POWDER, LYOPHILIZED, FOR SOLUTION INTRAVENOUS at 07:56

## 2020-06-24 RX ADMIN — DOCUSATE SODIUM 50 MG AND SENNOSIDES 8.6 MG 1 TABLET: 8.6; 5 TABLET, FILM COATED ORAL at 20:01

## 2020-06-24 RX ADMIN — ATORVASTATIN CALCIUM 40 MG: 40 TABLET, FILM COATED ORAL at 08:19

## 2020-06-24 RX ADMIN — OXYBUTYNIN CHLORIDE 5 MG: 5 TABLET ORAL at 08:19

## 2020-06-24 RX ADMIN — Medication 10 ML: at 20:00

## 2020-06-24 RX ADMIN — HEPARIN SODIUM 2600 UNITS: 5000 INJECTION INTRAVENOUS; SUBCUTANEOUS at 21:50

## 2020-06-24 RX ADMIN — ASPIRIN 81 MG: 81 TABLET, COATED ORAL at 08:22

## 2020-06-24 RX ADMIN — SERTRALINE HYDROCHLORIDE 100 MG: 100 TABLET ORAL at 08:19

## 2020-06-24 RX ADMIN — FAMOTIDINE 20 MG: 20 TABLET ORAL at 20:01

## 2020-06-24 RX ADMIN — CEFEPIME 2 G: 2 INJECTION, POWDER, FOR SOLUTION INTRAVENOUS at 03:50

## 2020-06-24 RX ADMIN — OXYBUTYNIN CHLORIDE 5 MG: 5 TABLET ORAL at 20:01

## 2020-06-24 RX ADMIN — POTASSIUM CHLORIDE 20 MEQ: 29.8 INJECTION, SOLUTION INTRAVENOUS at 09:35

## 2020-06-24 RX ADMIN — POTASSIUM CHLORIDE 20 MEQ: 29.8 INJECTION, SOLUTION INTRAVENOUS at 10:36

## 2020-06-24 RX ADMIN — HEPARIN SODIUM 5200 UNITS: 5000 INJECTION INTRAVENOUS; SUBCUTANEOUS at 14:11

## 2020-06-24 RX ADMIN — SODIUM CHLORIDE: 9 INJECTION, SOLUTION INTRAVENOUS at 08:27

## 2020-06-24 RX ADMIN — Medication 10 ML: at 08:20

## 2020-06-24 RX ADMIN — TRAMADOL HYDROCHLORIDE 50 MG: 50 TABLET, FILM COATED ORAL at 16:47

## 2020-06-24 RX ADMIN — TAMSULOSIN HYDROCHLORIDE 0.4 MG: 0.4 CAPSULE ORAL at 08:19

## 2020-06-24 RX ADMIN — SODIUM CHLORIDE: 9 INJECTION, SOLUTION INTRAVENOUS at 20:02

## 2020-06-24 ASSESSMENT — PAIN SCALES - GENERAL
PAINLEVEL_OUTOF10: 5
PAINLEVEL_OUTOF10: 0

## 2020-06-24 ASSESSMENT — PAIN DESCRIPTION - FREQUENCY: FREQUENCY: INTERMITTENT

## 2020-06-24 ASSESSMENT — PAIN DESCRIPTION - DESCRIPTORS: DESCRIPTORS: ACHING

## 2020-06-24 ASSESSMENT — PAIN DESCRIPTION - PAIN TYPE: TYPE: CHRONIC PAIN

## 2020-06-24 ASSESSMENT — PAIN DESCRIPTION - LOCATION: LOCATION: THROAT

## 2020-06-24 ASSESSMENT — PAIN - FUNCTIONAL ASSESSMENT: PAIN_FUNCTIONAL_ASSESSMENT: ACTIVITIES ARE NOT PREVENTED

## 2020-06-24 ASSESSMENT — PAIN DESCRIPTION - ONSET: ONSET: AWAKENED FROM SLEEP

## 2020-06-24 NOTE — PROGRESS NOTES
Hospitalist Progress Note      PCP: No primary care provider on file. Date of Admission: 6/21/2020    Chief Complaint: hypotension, fever    Subjective:  Patient seen and examined at the bedside. T/f to floor yesterday although still physically in ICU. Nocomplaints at this time . PFHS: reviewed as documented 6/21/2020, no changes      Medications:  Reviewed    Infusion Medications    sodium chloride 125 mL/hr at 06/24/20 0827     Scheduled Medications    vancomycin  1,000 mg Intravenous Q8H    oxybutynin  5 mg Oral BID    sodium chloride  20 mL Intravenous Once    cefepime  2 g Intravenous Q8H    sodium chloride  250 mL Intravenous Once    aspirin  81 mg Oral Daily    atorvastatin  40 mg Oral Daily    sertraline  100 mg Oral Daily    tamsulosin  0.4 mg Oral Daily    sodium chloride flush  10 mL Intravenous 2 times per day    polyethylene glycol  17 g Oral Daily    sennosides-docusate sodium  1 tablet Oral BID    famotidine (PEPCID) injection  20 mg Intravenous BID     PRN Meds: bisacodyl, dextran 70-hypromellose, magic (miracle) mouthwash, melatonin, traMADol, sodium chloride flush, acetaminophen **OR** acetaminophen, potassium chloride, magnesium sulfate, ondansetron      Intake/Output Summary (Last 24 hours) at 6/24/2020 0847  Last data filed at 6/24/2020 2131  Gross per 24 hour   Intake 3562 ml   Output 2275 ml   Net 1287 ml         Physical Exam Performed:    /72   Pulse 82   Temp 99 °F (37.2 °C) (Oral)   Resp 18   Ht 5' 10\" (1.778 m)   Wt 142 lb 10.2 oz (64.7 kg)   SpO2 97%   BMI 20.47 kg/m²     General appearance:  No apparent distress, appears stated age  Eyes: Pupils equal, round, reactive to light, conjunctiva/corneas clear  Ears/Nose/Mouth/Throat: No external lesions or scars, hearing intact to voice, nasal cannula  Neck: Trachea midline, no masses noted  Respiratory:  Normal respiratory effort.  Diffuse wheezes  Cardiovascular: Regular rate and rhythm, nl S1/S2, w/o splenomegaly. 5.  Right nephrolithiasis. XR CHEST PORTABLE   Final Result      Bilateral airspace disease concerning for pneumonia; Viral pneumonia should be considered. Assessment/Plan:    Active Hospital Problems    Diagnosis Date Noted    Septic shock ([facility] [address] [A41.9, R65.21]     Severe sepsis ([facility] [address] [A41.9, R65.20] 06/21/2020       Plan:    # Severe sepsis due to multifocal PNA, likely gram positive  -COVID negative  -Repeat cultures growing staph aureus  -Off pressors  -Continues on vancomycin, cefepime    # AOC diastolic HF  -Elevated proBNP  -s/p sepsis fluid bolus  -Echo pending    # AOC anemia, thrombocytopenia  -s/p transfusion of 1u pRBC  -appreciate GI input, no evidence of blood loss, likely hemolytic/sonsumtive process related to acute infection    # NSTEMI  -Likely type 2  -s/p fluids, pressors    # Constipation  -scheduled colace and senna    # h/o stage 4 SCC tongue base  -Cisplatin  -s/p radiation  -Palliative care consulted    # h/o Mechanical AV in 2000  -Warfarin dosed by pharmacy, goal 2.5-3.5    # CAD s/p Stent 1997  # CVA w/ residual L hemiparesis, dysarthria, visual deficit   -Continue ASA    # GERD  -IV protonix    # BPH  -Continue flomax    # Overactive bladder  oxybutynin    # HL  -Continue Lipitor    DVT Prophylaxis: warfarin for AV  Diet: DIET TUBE FEED CONTINUOUS/CYCLIC NPO; STANDARD WITH FIBER (jevity 1.2);  Gastrostomy; Continuous; 25; 50; 24  Dietary Nutrition Supplements: Protein Modular  Code Status: Full Code    PT/OT Eval Status: ongoing    Dispo - Jorge Dasilva MD

## 2020-06-24 NOTE — PROGRESS NOTES
Dr. Bucky Gordillo updated on patient condition, patient assessment & patient lab results this morning. New orders received for Potassium Chloride Replacement order set, see MAR. New orders received for daily H&H checks for normalized hemoglobin results, see chart review. New orders received to have patient be seen by PT/OT for evaluation & strict bedrest order removed. Patient still waiting on bed placement out of ICU. Patient assessment unchanged from previous assessments done, patient resting in bed watching television with no c/o pain or discomfort. VSS with 2L oxygenation via nasal cannula, see flowsheets. Patient bed alarm set, call light within reach & bed placed in lowest position.

## 2020-06-24 NOTE — PROGRESS NOTES
Pharmacy Progress Note    Famotidine (Pecid) 20 mg IV BID ordered for GI prophylaxis. As patient is tolerating other oral medications and Hg/Hct is stable, will change to famotidine (Pepcid) 20 mg PO BID per IV to PO policy. Patient Selection for IV to PO Conversion   A. Able to tolerate oral / NG medications, diet, or tube feeding   B. Exhibits signs of clinical improvement specific to the drug therapy to be switched   C. Professional judgement of the pharmacist indicates that PO medications are appropriate for the patient   D. Exclusion Criteria  · NPO  · Not tolerating feeding (e.g., nausea, vomiting, diarrhea, large residuals on tube feeding)  · Continuous NG suctioning  · Active GI bleed       Please call pharmacy with any questions.   Torres Gonsalez PharmD  PGY1 Pharmacy Resident   Wireless: [phone]  6/24/2020 1:43 PM

## 2020-06-24 NOTE — CARE COORDINATION
Case Management Assessment           Daily Note                 Date/ Time of Note: 6/24/2020 1:38 PM         Note completed by: Jennifer Skiff Day    Patient Name: Zulema Leong  YOB: 1960    Diagnosis:Severe sepsis ([facility] [address] [A41.9, R65.20]  Severe sepsis ([facility] [address] [A41.9, R65.20]  Patient Admission Status: Inpatient    Date of Admission:6/21/2020  3:01 PM Length of Stay: 3 GLOS:        Current Plan of Care: Floor Status   ________________________________________________________________________________________  PT AM-PAC:   / 24 per last evaluation on: Pending     OT AM-PAC:   / 24 per last evaluation on: Pending     DME Needs for discharge: defer  ________________________________________________________________________________________  Discharge Plan: Other: Return to Assisted Living at [facility]     Tentative discharge date: 6/26    Current barriers to discharge: Medical Clearance, [place] willing to re-accept. Referrals completed: Other: [facility]    Resources/ information provided: Not indicated at this time  ________________________________________________________________________________________  Case Management Notes: SW rounded on this date. Plan remains for patient to return to [facility] [place] if possible. Patient reported no concerns this AM. Communicates via writing board. SW received a response from Darshan Meraz Liaison [phone]. She updated her on patient's progress and status and goal to return to [place]. Eber at [facility] was asking for update. Nida stated she would give him the response and follow up in ability to return to [place]. SW continues to follow. Oliva Diaz and his family were provided with choice of provider; he and his family are in agreement with the discharge plan.     Care Transition Patient: No Helaine Skiff Day, MSW  The [address] [place] [address] ICU  Case Management Department  Ph: [phone]

## 2020-06-24 NOTE — PLAN OF CARE
Problem: Falls - Risk of:  Goal: Will remain free from falls  Description: Will remain free from falls  6/24/2020 0715 by Layla Alexander RN  Outcome: Ongoing  6/23/2020 2336 by Summer House RN  Outcome: Ongoing  Goal: Absence of physical injury  Description: Absence of physical injury  6/24/2020 0715 by Layla Alexander RN  Outcome: Ongoing  6/23/2020 2336 by Summer House RN  Outcome: Ongoing     Problem: Infection:  Goal: Will remain free from infection  Description: Will remain free from infection  6/24/2020 0715 by Layla Alexander RN  Outcome: Ongoing  6/23/2020 2336 by Summer House RN  Outcome: Ongoing     Problem: Safety:  Goal: Free from accidental physical injury  Description: Free from accidental physical injury  6/24/2020 0715 by Layla Alexander RN  Outcome: Ongoing  6/23/2020 2336 by Summer House RN  Outcome: Ongoing  Goal: Free from intentional harm  Description: Free from intentional harm  6/24/2020 0715 by Layla Alexander RN  Outcome: Ongoing  6/23/2020 2336 by Summer House RN  Outcome: Ongoing     Problem: Pain:  Goal: Patient's pain/discomfort is manageable  Description: Patient's pain/discomfort is manageable  6/24/2020 0715 by Layla Alexander RN  Outcome: Ongoing  6/23/2020 2336 by Summer House RN  Outcome: Met This Shift  Goal: Pain level will decrease  Description: Pain level will decrease  6/24/2020 0715 by Layla Alexander RN  Outcome: Ongoing  6/23/2020 2336 by Summer House RN  Outcome: Met This Shift  Goal: Control of acute pain  Description: Control of acute pain  6/24/2020 0715 by Layla Alexander RN  Outcome: Ongoing  6/23/2020 2336 by Summer House RN  Outcome: Met This Shift  Goal: Control of chronic pain  Description: Control of chronic pain  6/24/2020 0715 by Layla Alexander RN  Outcome: Ongoing  6/23/2020 2336 by Summer House RN  Outcome: Met This Shift     Problem: Discharge Planning:  Goal: Patients continuum of care needs are met  Description: Patients continuum of care needs are met  6/24/2020 0715 by Lizeth Boucher RN  Outcome: Ongoing  6/23/2020 2336 by Mario Sandy RN  Outcome: Ongoing     Problem: Nutrition  Goal: Optimal nutrition therapy  6/24/2020 0715 by Lizeth Boucher RN  Outcome: Ongoing  6/23/2020 2336 by Mario Sandy RN  Outcome: Ongoing     Problem: Infection - Central Venous Catheter-Associated Bloodstream Infection:  Goal: Will show no infection signs and symptoms  Description: Will show no infection signs and symptoms  6/24/2020 0715 by Lizeth Boucher RN  Outcome: Ongoing  6/23/2020 2336 by Mario Sandy RN  Outcome: Ongoing     Problem: Fluid Volume - Imbalance:  Goal: Absence of imbalanced fluid volume signs and symptoms  Description: Absence of imbalanced fluid volume signs and symptoms  6/24/2020 0715 by Lizeth Boucher RN  Outcome: Ongoing  6/23/2020 2336 by Mario Sandy RN  Outcome: Ongoing

## 2020-06-24 NOTE — PROGRESS NOTES
Result Notes   6/23 at 22:07 10.3 mcg/mL 10.3 mcg/mL Drawn appropriately on 1 g IV q12h       Assessment/Plan:  1. Sepsis:  Empiric PNA; COVID rule out. Cefepime + vancomycin day #4  Vancomycin - pharmacy to dose  · Trough from last night resulted at 10.3 mcg/mL on 1g IV q12h after dose was adjusted due to improvement in renal function. · Goal trough for pneumonia is 15-20 mcg/mL. · Adjusted dose to 1 g IV q8h to target a higher trough. · Renal function will be monitored closely and dosing will be adjusted as appropriate. · Since mecA gene was not detected in Staphylococcus aureus culture, would recommend discontinuing vancomycin as cefepime will cover MSSA. Cefepime  · Dose of 2 g IV q8h appropriate for CrCl of 147 mL/min. · Will continue to follow. Please call with any questions.   Joni Rivas, PharmD  PGY1 Pharmacy Resident   Wireless: [phone]  6/24/2020 7:41 AM

## 2020-06-24 NOTE — PROGRESS NOTES
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 Recommendations:     Pt says he is feeling better, neck pain is improved and constipation is resolved. He hopes to go to [facility] at discharge and then resume radiation and chemotherapy. We discussed whether the benefits of treatment outweigh the potential burdens/risks, and he says he wants to talk with his oncologist and rad onc about whether the treatment is effective so far. Offered outpatient palliative care and he was agreeable. With pt's permission, I called his sister Mark Tapia and gave her an update. She says pt had completed 32/35 radiation treatments and hopes he will be able to talk again after having time to heal. She will bring up his feeding device to make sure he can feed himself before going to [facility]. 1. Goals of Care/Advanced Care planning/Code status: Full Code. He hopes to recover and return to his nursing facility and resume treatment for his cancer. 2. Pain: pt reports his neck pain is improved, no pain at this time. 3. SOB: improved, titrated down to 3 L oxygen. He's here with sepsis secondary to pneumonia. Covid test negative. 4. Constipation: resolved. Pt reports having BMs, feels more comfortable now.   5. Disposition: d/c to [facility] when medically ready    Subjective:     Scheduled Meds:   famotidine  20 mg Oral BID    oxybutynin  5 mg Oral BID    sodium chloride  20 mL Intravenous Once    cefepime  2 g Intravenous Q8H    sodium chloride  250 mL Intravenous Once    aspirin  81 mg Oral Daily    atorvastatin  40 mg Oral Daily    sertraline  100 mg Oral Daily    tamsulosin  0.4 mg Oral Daily    sodium chloride flush  10 mL Intravenous 2 times per day    polyethylene glycol  17 g Oral Daily    sennosides-docusate sodium  1 tablet Oral BID       Continuous Infusions:   heparin (porcine) 18 Units/kg/hr (06/24/20 1411)    sodium chloride 125 mL/hr at 06/24/20 0827       PRN Meds:potassium chloride, heparin (porcine), heparin (porcine), bisacodyl, dextran 70-hypromellose, magic (miracle) mouthwash, melatonin, traMADol, sodium chloride flush, acetaminophen **OR** acetaminophen, magnesium sulfate, ondansetron    Review of Systems -   General ROS: negative  Psychological ROS: negative  ENT ROS: negative  Hematological and Lymphatic ROS: positive for - fatigue  Respiratory ROS: no cough, shortness of breath, or wheezing  Cardiovascular ROS: no chest pain or dyspnea on exertion  Gastrointestinal ROS: positive for - swallowing difficulty/pain  Genito-Urinary ROS: no dysuria, trouble voiding, or hematuria  Musculoskeletal ROS: positive for - neck pain, left sided hemiparesis  Neurological ROS: positive for - CVA in 1999    Performance status 30%    Objective:     Patient Vitals for the past 8 hrs:   BP Temp Temp src Pulse Resp SpO2   06/24/20 1600 (!) 141/55 98.8 °F (37.1 °C) Oral 84 22 95 %   06/24/20 1500 122/61 -- -- 78 24 96 %   06/24/20 1400 125/62 -- -- 76 22 96 %   06/24/20 1300 127/66 -- -- 85 17 95 %   06/24/20 1200 136/61 98.4 °F (36.9 °C) Oral 90 23 94 %   06/24/20 1100 (!) 132/50 -- -- 86 29 94 %   06/24/20 1000 (!) 125/59 -- -- 73 27 95 %   06/24/20 0900 (!) 130/51 -- -- 72 22 93 %     I/O last 3 completed shifts: In: 5237.8 [I.V.:3120.8; NG/GT:1367; IV Piggyback:750]  Out: 2424 [Urine:2575]  No intake/output data recorded.     General appearance: alert, appears stated age and cooperative  Lungs: wheezes bilaterally  Heart: regular rate and rhythm  Abdomen: soft, nontender, PEG in place  Extremities: no edema, LUE contracted  Skin: Skin color, texture, turgor normal. No rashes or lesions  Neurologic: Mental status: Alert, oriented, thought content

## 2020-06-25 PROBLEM — C02.9 TONGUE CANCER (HCC): Status: ACTIVE | Noted: 2020-06-25

## 2020-06-25 PROBLEM — Z95.2 H/O MECHANICAL AORTIC VALVE REPLACEMENT: Status: ACTIVE | Noted: 2020-06-25

## 2020-06-25 PROBLEM — B95.61 STAPHYLOCOCCUS AUREUS BACTEREMIA: Status: ACTIVE | Noted: 2020-06-25

## 2020-06-25 PROBLEM — R78.81 STAPHYLOCOCCUS AUREUS BACTEREMIA: Status: ACTIVE | Noted: 2020-06-25

## 2020-06-25 LAB
ANION GAP SERPL CALCULATED.3IONS-SCNC: 4 MMOL/L (ref 3–16)
ANISOCYTOSIS: ABNORMAL
ANTI-XA UNFRAC HEPARIN: 0.13 IU/ML (ref 0.3–0.7)
ANTI-XA UNFRAC HEPARIN: 0.21 IU/ML (ref 0.3–0.7)
ANTI-XA UNFRAC HEPARIN: 0.24 IU/ML (ref 0.3–0.7)
ANTI-XA UNFRAC HEPARIN: 0.31 IU/ML (ref 0.3–0.7)
BASOPHILS ABSOLUTE: 0 K/UL (ref 0–0.2)
BASOPHILS RELATIVE PERCENT: 0 %
BUN BLDV-MCNC: 12 MG/DL (ref 7–20)
CALCIUM SERPL-MCNC: 7.6 MG/DL (ref 8.3–10.6)
CHLORIDE BLD-SCNC: 108 MMOL/L (ref 99–110)
CO2: 27 MMOL/L (ref 21–32)
CREAT SERPL-MCNC: <0.5 MG/DL (ref 0.9–1.3)
EOSINOPHILS ABSOLUTE: 0.1 K/UL (ref 0–0.6)
EOSINOPHILS RELATIVE PERCENT: 4 %
GFR AFRICAN AMERICAN: >60
GFR NON-AFRICAN AMERICAN: >60
GLUCOSE BLD-MCNC: 103 MG/DL (ref 70–99)
HCT VFR BLD CALC: 24.1 % (ref 40.5–52.5)
HEMOGLOBIN: 8.2 G/DL (ref 13.5–17.5)
INR BLD: 1.26 (ref 0.86–1.14)
LYMPHOCYTES ABSOLUTE: 0.4 K/UL (ref 1–5.1)
LYMPHOCYTES RELATIVE PERCENT: 11 %
MCH RBC QN AUTO: 30.2 PG (ref 26–34)
MCHC RBC AUTO-ENTMCNC: 34.1 G/DL (ref 31–36)
MCV RBC AUTO: 88.3 FL (ref 80–100)
MONOCYTES ABSOLUTE: 0.4 K/UL (ref 0–1.3)
MONOCYTES RELATIVE PERCENT: 10 %
NEUTROPHILS ABSOLUTE: 2.7 K/UL (ref 1.7–7.7)
NEUTROPHILS RELATIVE PERCENT: 75 %
PDW BLD-RTO: 18.2 % (ref 12.4–15.4)
PLATELET # BLD: 77 K/UL (ref 135–450)
PLATELET SLIDE REVIEW: ABNORMAL
PMV BLD AUTO: 7.7 FL (ref 5–10.5)
POTASSIUM REFLEX MAGNESIUM: 3.6 MMOL/L (ref 3.5–5.1)
PROTHROMBIN TIME: 14.6 SEC (ref 10–13.2)
RBC # BLD: 2.73 M/UL (ref 4.2–5.9)
SCHISTOCYTES: ABNORMAL
SODIUM BLD-SCNC: 139 MMOL/L (ref 136–145)
TEAR DROP CELLS: ABNORMAL
WBC # BLD: 3.6 K/UL (ref 4–11)

## 2020-06-25 PROCEDURE — 99223 1ST HOSP IP/OBS HIGH 75: CPT | Performed by: INTERNAL MEDICINE

## 2020-06-25 PROCEDURE — 6360000002 HC RX W HCPCS: Performed by: STUDENT IN AN ORGANIZED HEALTH CARE EDUCATION/TRAINING PROGRAM

## 2020-06-25 PROCEDURE — 2580000003 HC RX 258: Performed by: STUDENT IN AN ORGANIZED HEALTH CARE EDUCATION/TRAINING PROGRAM

## 2020-06-25 PROCEDURE — 97530 THERAPEUTIC ACTIVITIES: CPT

## 2020-06-25 PROCEDURE — 80048 BASIC METABOLIC PNL TOTAL CA: CPT

## 2020-06-25 PROCEDURE — 97162 PT EVAL MOD COMPLEX 30 MIN: CPT

## 2020-06-25 PROCEDURE — 6370000000 HC RX 637 (ALT 250 FOR IP): Performed by: STUDENT IN AN ORGANIZED HEALTH CARE EDUCATION/TRAINING PROGRAM

## 2020-06-25 PROCEDURE — 36415 COLL VENOUS BLD VENIPUNCTURE: CPT

## 2020-06-25 PROCEDURE — 85025 COMPLETE CBC W/AUTO DIFF WBC: CPT

## 2020-06-25 PROCEDURE — 6360000002 HC RX W HCPCS: Performed by: INTERNAL MEDICINE

## 2020-06-25 PROCEDURE — 2700000000 HC OXYGEN THERAPY PER DAY

## 2020-06-25 PROCEDURE — 97166 OT EVAL MOD COMPLEX 45 MIN: CPT

## 2020-06-25 PROCEDURE — 94761 N-INVAS EAR/PLS OXIMETRY MLT: CPT

## 2020-06-25 PROCEDURE — 1200000000 HC SEMI PRIVATE

## 2020-06-25 PROCEDURE — 97535 SELF CARE MNGMENT TRAINING: CPT

## 2020-06-25 PROCEDURE — 85610 PROTHROMBIN TIME: CPT

## 2020-06-25 PROCEDURE — 2580000003 HC RX 258: Performed by: INTERNAL MEDICINE

## 2020-06-25 PROCEDURE — 85520 HEPARIN ASSAY: CPT

## 2020-06-25 PROCEDURE — 6370000000 HC RX 637 (ALT 250 FOR IP): Performed by: INTERNAL MEDICINE

## 2020-06-25 RX ADMIN — SERTRALINE HYDROCHLORIDE 100 MG: 100 TABLET ORAL at 08:45

## 2020-06-25 RX ADMIN — ATORVASTATIN CALCIUM 40 MG: 40 TABLET, FILM COATED ORAL at 08:45

## 2020-06-25 RX ADMIN — HEPARIN SODIUM 2600 UNITS: 5000 INJECTION INTRAVENOUS; SUBCUTANEOUS at 15:04

## 2020-06-25 RX ADMIN — HEPARIN SODIUM 22 UNITS/KG/HR: 10000 INJECTION, SOLUTION INTRAVENOUS at 11:43

## 2020-06-25 RX ADMIN — OXYBUTYNIN CHLORIDE 5 MG: 5 TABLET ORAL at 22:00

## 2020-06-25 RX ADMIN — ACETAMINOPHEN 650 MG: 325 TABLET ORAL at 09:26

## 2020-06-25 RX ADMIN — Medication 6 MG: at 22:01

## 2020-06-25 RX ADMIN — WARFARIN SODIUM 11 MG: 5 TABLET ORAL at 11:42

## 2020-06-25 RX ADMIN — OXYBUTYNIN CHLORIDE 5 MG: 5 TABLET ORAL at 08:45

## 2020-06-25 RX ADMIN — ASPIRIN 81 MG: 81 TABLET, COATED ORAL at 08:45

## 2020-06-25 RX ADMIN — Medication 10 ML: at 22:14

## 2020-06-25 RX ADMIN — CEFEPIME 2 G: 2 INJECTION, POWDER, FOR SOLUTION INTRAVENOUS at 04:17

## 2020-06-25 RX ADMIN — Medication 10 ML: at 08:45

## 2020-06-25 RX ADMIN — Medication 5 ML: at 17:01

## 2020-06-25 RX ADMIN — ONDANSETRON 4 MG: 2 INJECTION INTRAMUSCULAR; INTRAVENOUS at 17:01

## 2020-06-25 RX ADMIN — CEFEPIME 2 G: 2 INJECTION, POWDER, FOR SOLUTION INTRAVENOUS at 11:42

## 2020-06-25 RX ADMIN — FAMOTIDINE 20 MG: 20 TABLET ORAL at 08:45

## 2020-06-25 RX ADMIN — HEPARIN SODIUM 2600 UNITS: 5000 INJECTION INTRAVENOUS; SUBCUTANEOUS at 02:55

## 2020-06-25 RX ADMIN — SODIUM CHLORIDE: 9 INJECTION, SOLUTION INTRAVENOUS at 04:17

## 2020-06-25 RX ADMIN — FAMOTIDINE 20 MG: 20 TABLET ORAL at 22:00

## 2020-06-25 RX ADMIN — TAMSULOSIN HYDROCHLORIDE 0.4 MG: 0.4 CAPSULE ORAL at 08:45

## 2020-06-25 RX ADMIN — ACETAMINOPHEN 650 MG: 325 TABLET ORAL at 22:06

## 2020-06-25 RX ADMIN — CEFAZOLIN SODIUM 2 G: 1 POWDER, FOR SOLUTION INTRAMUSCULAR; INTRAVENOUS at 21:58

## 2020-06-25 ASSESSMENT — PAIN DESCRIPTION - ONSET
ONSET: PROGRESSIVE
ONSET: AWAKENED FROM SLEEP

## 2020-06-25 ASSESSMENT — PAIN DESCRIPTION - PAIN TYPE
TYPE: ACUTE PAIN
TYPE: CHRONIC PAIN

## 2020-06-25 ASSESSMENT — PAIN DESCRIPTION - LOCATION
LOCATION: THROAT
LOCATION: THROAT

## 2020-06-25 ASSESSMENT — PAIN SCALES - GENERAL
PAINLEVEL_OUTOF10: 0
PAINLEVEL_OUTOF10: 3
PAINLEVEL_OUTOF10: 0
PAINLEVEL_OUTOF10: 6
PAINLEVEL_OUTOF10: 0

## 2020-06-25 ASSESSMENT — PAIN DESCRIPTION - DESCRIPTORS
DESCRIPTORS: ACHING
DESCRIPTORS: CONSTANT

## 2020-06-25 ASSESSMENT — PAIN DESCRIPTION - PROGRESSION: CLINICAL_PROGRESSION: GRADUALLY WORSENING

## 2020-06-25 ASSESSMENT — PAIN - FUNCTIONAL ASSESSMENT
PAIN_FUNCTIONAL_ASSESSMENT: PREVENTS OR INTERFERES SOME ACTIVE ACTIVITIES AND ADLS
PAIN_FUNCTIONAL_ASSESSMENT: ACTIVITIES ARE NOT PREVENTED

## 2020-06-25 ASSESSMENT — PAIN DESCRIPTION - FREQUENCY
FREQUENCY: INTERMITTENT
FREQUENCY: CONTINUOUS

## 2020-06-25 NOTE — PROGRESS NOTES
Palliative Care Chart Review  and Check in Note:     NAME:  Liya Ball  Admit Date: 6/21/2020  Hospital Day:  Hospital Day: 5   Current Code status: Full Code    Palliative care is continuing to following Mr. Kelsea Coronel for symptom management,  and goals of care discussion as needed. Patient's chart reviewed today 6/25/20. Received phone call from pt's daughter Joe Webb this morning, she reports concerns that pt was having trouble using his own feeding tube at [facility] as it is an [place] facility, does not provide assistance. They had considered using care connections to assist with this in the past. D/w Deangelo Walton who will follow for discharge planning     The following are the currently established goals/code status, and Symptom management.      Goals of care: Pt hopeful he will recover and return to [place], resume treatment for cancer      Code status: Full Code      Discharge plan: Return to [facility] when medically ready    Kendra Gonsalves [facility]  Inpatient Palliative Care  [phone]

## 2020-06-25 NOTE — CONSULTS
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 He presented from [facility] with fever (Tmax 102.9F in ED), tachycardia, and hypotension. He was admitted for the treatment of sepsis with IV antibiotics. Warfarin was held for low Hgb and concern for bleeding. FOBT+ and GI was consulted but was not concerned for active bleeding. Patient is now being restarted on warfarin. Pharmacy is consulted to dose warfarin per Dr. Randa Romberg anticoagulation regimen:  Warfarin 11 mg daily per medication list from [facility]  · Per chart review, patient's warfarin is managed by [facility]. His most recent INR check was 1.4 on 6/16, but unable to see instructions. He has been on and off enoxaparin (Lovenox). · Per note on 6/10, there has been difficulty keeping patient's INR therapeutic. RN noted that patient had PEG tube placed in the hospital and that tube feeds may have had vitamin K in it which could have caused INR to decrease. Date INR Warfarin Dose   6/25 1.26                       Recent Labs     06/24/20  0300 06/25/20  0710    139   K 3.4* 3.6    108   CO2 25 27   BUN 14 12   CREATININE 0.5* <0.5*   GLUCOSE 131* 103*       CrCl cannot be calculated (This lab value cannot be used to calculate CrCl because it is not a number: <0.5). Lab Results   Component Value Date    WBC 3.6 (L) 06/25/2020    HGB 8.2 (L) 06/25/2020    HCT 24.1 (L) 06/25/2020    MCV 88.3 06/25/2020    PLT 77 (L) 06/25/2020       Lab Results   Component Value Date    PROTIME 14.6 (H) 06/25/2020    INR 1.26 (H) 06/25/2020       Height:  5' 10\" (177.8 cm)  Weight:  142 lb 10.2 oz (64.7 kg)        Assessment/Plan:  1. Anticoagulation:  Mechanical Aortic Valve replacement (Goal INR: 2.5-3.5)   · Patient presented with a theraeputic INR of 3.44. · Per chart review, patient's INR has been labile recently and difficult to keep therapeutic. There was speculation that tube feeds recently started may have had vitamin K which caused INR to decrease. · His most recent INR check per chart review was on 6/16 where INR was 1.26  · INR today = 1.26 (subtherapeutic)  · Patient is currently on a heparin gtt for bridging. Recommend to continue this until INR is 2.5 and stable. · Will continue warfarin 11 mg daily for now. · Medication profile reviewed for potential drug interactions. No significant drug interactions with warfarin noted. · Daily INR will be monitored and dose adjustments made as needed. Please call with any questions. Thanks for consulting pharmacy!   Elida Cruz, PharmD  PGY1 Pharmacy Resident   Wireless: [phone]  6/25/2020 1:05 PM

## 2020-06-25 NOTE — CONSULTS
medical condition and Nutrition Support-EN    NUTRITION INTERVENTION  Food and/or Nutrient Delivery:Continue NPO or Modify Current Tube Feeding   Nutrition education/counseling/coordination of care: Continue Inpatient Monitoring     NUTRITION MONITORING & EVALUATION:  Evaluation:Progressing towards goal   Goals: Pt will tolerate the most appropriate nutrition support regimen to meet >75% of needs once initiated.    Monitoring: Pertinent Labs , TF Intake  or TF Tolerance      OBJECTIVE DATA:  · Nutrition-Focused Physical Findings:  LBM 6/24  · Wounds None      Past Medical History:   Diagnosis Date    Adult failure to thrive     Dysphagia, unspecified     Encounter for attention to gastrostomy ([facility] [address]     GERD without esophagitis     Hyperlipidemia     Hypertension     Legal blindness, as defined in [address] term (current) use of anticoagulants     Malignant neoplasm of oropharynx, unspecified (HCC)     Muscle weakness (generalized)     Overactive bladder     Presence of prosthetic heart valve     Secondary and unspecified malignant neoplasm of lymph nodes of head, face and neck (HCC)     Unspecified severe protein-calorie malnutrition (HCC)     Unsteadiness on feet         ANTHROPOMETRICS  Current Height: 5' 10\" (177.8 cm)  Current Weight: 142 lb 10.2 oz (64.7 kg)    Admission weight: 138 lb (62.6 kg)  Ideal Bodyweight 166 lb (75.4 kg)   Usual Bodyweight BRYAN   Adjusted Bodyweight n/a  Weight Changes BRYAN       BMI BMI (Calculated): 20.5    Wt Readings from Last 50 Encounters:   06/23/20 142 lb 10.2 oz (64.7 kg)       COMPARATIVE STANDARDS  Estimated Total Kcals/Day : 25-30  Current Bodyweight (62 kg) 8007-6599 kcal    Estimated Total Protein (g/day) : 1.3-1.5 Current Bodyweight (62 kg) 80-93 g/day  Estimated Daily Total Fluid (ml/day): 2065-1853 mL per day     Food / Nutrition-Related History  Pre-Admission / Home Diet:  Pre-Admission/Home Diet: Enteral Nutrition   Home Supplements / Herbals:    none

## 2020-06-25 NOTE — PROGRESS NOTES
6/25/2020  2:39 PM - Thomas Peters Incoming Lab Results From Soft (Epic Adt)     Component Value Ref Range & Units Status Collected Lab   Anti-XA Unfrac Heparin 0.13Low   0.30 - 0.70 IU/mL Final 06/25/2020  2:19  [facility]   Reference range for unfractionated heparin weight          based. Acute Coronary Syndrome, AMI. Dr. Jackeline Rojo updated on current Heparin Lab Result for PRN Heparin Bolus dose & Heparin Gtt clarification due to noticeable decrease in lab value from previous results. Orders received via secure messaging system from Dr. Jackeline Rojo to continue using Heparin Order Set for dosing. Patient PRN Heparin Half-Bolus Given & Rate Change per Protocol Order Set, see MAR. Patient updated on plan of care with all questions answered.

## 2020-06-25 NOTE — CARE COORDINATION
Case Management Assessment           Daily Note                 Date/ Time of Note: 6/25/2020 3:55 PM         Note completed by: Milda Meckel Day    Patient Name: David Cristobal  YOB: 1960    Diagnosis:Severe sepsis ([address] [A41.9, R65.20]  Severe sepsis ([facility] [address] [A41.9, R65.20]  Patient Admission Status: Inpatient    Date of Admission:6/21/2020  3:01 PM Length of Stay: 4 GLOS:        Current Plan of Care: Floor Status Switch to bolus tube feeds. Move to bridge to Warfarin.   ________________________________________________________________________________________  PT AM-PAC: 14 / 24 per last evaluation on: 6/25     OT AM-PAC: 17 / 24 per last evaluation on: 6/25    DME Needs for discharge: defer  ________________________________________________________________________________________  Discharge Plan: Other: Return to Assisted Living at [facility] for home care. Will need to reach out to [facility] to get pump device moved to [facility]. Tentative discharge date: 6/26    Current barriers to discharge: Medical Clearance, [place] willing to re-accept. Referrals completed: Other: [facility] Arizmendi    Resources/ information provided: Not indicated at this time  ________________________________________________________________________________________  Case Management Notes: SW rounded on this date. Working with Dietary to move to bolus feeds. Bridge to Warfarin. Assist with getting pump sent back to [facility]. Patient would like [facility] for skilled care support. HE reported he only wants communication through him and his daughter, not his sister. Marianne Chauhan and his family were provided with choice of provider; he and his family are in agreement with the discharge plan.     Care Transition Patient: No    Milda Meckel Day, MSW  The [address] [place] [address] ICU  Case Management Department  Ph: [phone]

## 2020-06-25 NOTE — CONSULTS
Infectious Diseases Inpatient Consult Note    Reason for Consult:   S aureus bacteremia   Requesting Physician:   Dr Bucky Gordillo  Primary Care Physician:  No primary care provider on file. 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 Admit 6/21 to ICU   Started on levofed, vancomycin, cefepime  Elevated BNP, troponin  COVID-19 neg  BC + MSSA    Today 6/25 - afebrile, 3L  Conversant. Feeling good    Past Medical History:    Past Medical History:   Diagnosis Date    Adult failure to thrive     Dysphagia, unspecified     Encounter for attention to gastrostomy ([facility] [address]     GERD without esophagitis     Hyperlipidemia     Hypertension     Legal blindness, as defined in [address] term (current) use of anticoagulants     Malignant neoplasm of oropharynx, unspecified (HCC)     Muscle weakness (generalized)     Overactive bladder     Presence of prosthetic heart valve     Secondary and unspecified malignant neoplasm of lymph nodes of head, face and neck (HCC)     Unspecified severe protein-calorie malnutrition (HCC)     Unsteadiness on feet        Past Surgical History:    No past surgical history on file.     Current Medications:     warfarin  11 mg Oral Daily    famotidine  20 mg Oral BID    oxybutynin  5 mg Oral BID    sodium chloride  20 mL Intravenous Once    cefepime  2 g Intravenous Q8H    sodium chloride  250 mL Intravenous Once    aspirin  81 mg Oral Daily    atorvastatin  40 mg Oral Daily    sertraline  100 mg Oral Daily    tamsulosin  0.4 mg Oral Daily    sodium chloride flush  10 mL Intravenous 2 times per day    polyethylene glycol  17 g Oral Daily    sennosides-docusate sodium  1 tablet Oral BID       Allergies:  Patient has no known allergies. Social History:    TOBACCO:    None   ETOH:    None   DRUGS:   None     Family History:   No immunodeficiency    REVIEW OF SYSTEMS:    No fever / chills / sweats. No weight loss. No visual change, eye pain, eye discharge. No oral lesion, sore throat, dysphagia. Denies cough / sputum. Denies chest pain, palpitations. Denies n / v / abd pain. No diarrhea. Denies dysuria or change in urinary function. Denies joint swelling or pain. No myalgia, arthralgia. Denies skin changes, itching  Denies focal weakness, sensory change or other neurologic symptom    Denies new / worse depression, psychiatric symptoms  Denies any Endocrine or Hematologic symptoms    PHYSICAL EXAM:      Vitals:    /71   Pulse 62   Temp 98.1 °F (36.7 °C) (Oral)   Resp 23   Ht 5' 10\" (1.778 m)   Wt 142 lb 10.2 oz (64.7 kg)   SpO2 95%   BMI 20.47 kg/m²     GENERAL: Thin / malnourished appearing. No apparent distress.   3L  HEENT: Membranes moist, no oral lesion, PERRL  NECK:  Supple, no lymphadenopaty  LUNGS: Diminished BS R base  CARDIAC: RRR, no murmur appreciated  ABD:  + BS, soft / NT - G-tube in place  EXT:  No rash, no edema, no lesions  NEURO: No focal neurologic findings  PSYCH: Orientation, sensorium, mood normal  LINES:  R PICC (placed 6/21)    DATA:    Lab Results   Component Value Date    WBC 3.6 (L) 06/25/2020    HGB 8.2 (L) 06/25/2020    HCT 24.1 (L) 06/25/2020    MCV 88.3 06/25/2020    PLT 77 (L) 06/25/2020     Lab Results   Component Value Date    CREATININE <0.5 (L) 06/25/2020    BUN 12 06/25/2020     06/25/2020    K 3.6 06/25/2020     06/25/2020    CO2 27 06/25/2020       Hepatic Function Panel:   Lab Results   Component Value Date    ALKPHOS 48 06/22/2020    ALT 35 06/22/2020    AST 52 2020    PROT 4.7 2020    BILITOT 1.2 2020    BILIDIR 0.4 2020    IBILI 0.8 2020    LABALBU 2.6 2020       Micro:   BC no growth to date     BC x 2 S aureus / MSSA  ciprofloxacin Sensitive <=0.5 mcg/mL     clindamycin Sensitive <=0.25 mcg/mL     erythromycin Sensitive <=0.25 mcg/mL     oxacillin Sensitive 0.5 mcg/mL     tetracycline Sensitive <=1 mcg/mL     trimethoprim-sulfamethoxazole Sensitive <=10 mcg/mL       Imagin/22 Chest / Abd / Pelvic CT:  CHEST:       1.  Small right pleural effusion and multifocal pneumonia in the bilateral lungs with a light lower lobe predominance. 2.  Moderate cardiomegaly.       ABDOMEN/PELVIS:       1.  Rectal wall thickening representing proctitis. Rectum distended with stool. 2.  Colonic diverticulosis without evidence of diverticulitis. 3.  Cholelithiasis and mild gallbladder distention. 4.  Mild splenomegaly. 5.  Right nephrolithiasis.        IMPRESSION:      Patient Active Problem List   Diagnosis    Severe sepsis ([address]    Septic shock (HCC)       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  Hx CVA (L hemiparesis), CAD, HTN  Hx mechanical AoVR on coumadin  Debilitated, has G-tube, lives in facility [facility])  Had PICC in place on presentation    Presents from [facility] with fever and hypotension  MSSA bacteremia - PICC poss source, f/u [facility]  neg, TTE neg; r/o PVE (prosthetic v endocarditis)    RECOMMENDATIONS:    Change to ancef  D/c cefepime  Removed R PICC if same PICC pt had on admission - unclear from Epic documentation  NATALIE - would obtain given MSSA bacteremia in setting of mechanical valve    Discussed with pt  Michell Andujar MD

## 2020-06-25 NOTE — PROGRESS NOTES
alarm in place;Nurse notified       Treatment Diagnosis: Impaired functional mobility and ADL      Restrictions  Position Activity Restriction  Other position/activity restrictions: PEG tube    Subjective   General  Chart Reviewed: Yes  Patient assessed for rehabilitation services?: Yes  Additional Pertinent Hx: Pt admitted [facility] with fever and tachycardia. 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 Torres Vargas  Diagnosis: Severe Sepsis  Subjective  Subjective: Pt in bed upon entry. Pt wants to go back to [facility]. Patient Currently in Pain: Yes(Pt reported 5/10 throat pain, RN notified)  Social/Functional History  Social/Functional History  Lives With: Spouse  Type of Home: Apartment([facility], assisted living facility)  Home Layout: One level  Home Access: Level entry  Bathroom Shower/Tub: Walk-in shower  Bathroom Toilet: Handicap height  Bathroom Equipment: Built-in shower seat, Grab bars in shower, Grab bars around toilet  Bathroom Accessibility: Walker accessible  Home Equipment: Rolling walker, [employer], Wheelchair-electric, Quad cane(pt states that he has a modified walker with a platform for his L arm, has a motorized w/c and manual w/c)  ADL Assistance: [address] [place] [address]: [facility]  Ambulation Assistance: Independent(motorized w/c)  Transfer Assistance: Independent  Active : No  Additional Comments: Was using a quad cane for mobility, but fell in 2016. The next day he began using the motorized w/c for his mobility. He has not walked since 2016. He has spasms in his L foot, which made it unsafe to use his RW. Pt reports 1 fall about 6 months ago when transferring from w/c to toilet.   Has been living at [facility], but hopes to d/c to [facility]. Was getting PT prior to admission. Objective   Vision: Impaired  Vision Exceptions: Legally blind;Wears glasses at all times  Hearing: Exceptions to [facility]  Hearing Exceptions: Bilateral hearing aid    Orientation  Overall Orientation Status: Within Functional Limits     Balance  Sitting Balance: Stand by assistance(sitting edge of bed ~3 min)  Standing Balance: Stand by assistance(partial stand)  Standing Balance  Activity: transfer  ADL  Feeding: (PEG tube)  Grooming: Independent(to comb hair)  LE Dressing: Stand by assistance(to don socks sitting edge of bed)  Toileting: (denied need)  Tone RUE  RUE Tone: Normotonic  Tone LUE  LUE Tone: Hypertonic  Coordination  Movements Are Fluid And Coordinated: No  Coordination and Movement description: Left UE;Gross motor impairments; Fine motor impairments     Bed mobility  Supine to Sit: Minimal assistance  Scooting: Stand by assistance  Transfers  Stand Pivot Transfers: Stand by assistance  Sit to stand: Stand by assistance  Stand to sit: Stand by assistance     Cognition  Overall Cognitive Status: WNL                 LUE AROM (degrees)  LUE General AROM: Flx contractures  RUE AROM (degrees)  RUE AROM : WFL        Hand Dominance  Hand Dominance: Right         Treatment included ADL and transfer training.      Plan   Plan  Times per week: 2-5  Current Treatment Recommendations: Balance Training, Functional Mobility Training, Endurance Training, Self-Care / ADL    AM-PAC Score        AM-[facility] Inpatient Daily Activity Raw Score: 17 (06/25/20 1050)  AM-PAC Inpatient ADL T-Scale Score : 37.26 (06/25/20 1050)  ADL Inpatient CMS 0-100% Score: 50.11 (06/25/20 1050)  ADL Inpatient CMS G-Code Modifier : CK (06/25/20 1050)    Goals                             No goals met  Short term goals  Time Frame for Short term goals: Discharge  Short term goal 1: Transfer to/from [facility] with supervision  Patient Goals   Patient goals : Discharge to [facility]       Therapy Time   Individual Concurrent Group Co-treatment   Time In 0906         Time Out 1008         Minutes 62         Timed Code Treatment Minutes: [address] [place], OTR/L [address]

## 2020-06-25 NOTE — PLAN OF CARE
Problem: Falls - Risk of:  Goal: Will remain free from falls  Description: Will remain free from falls  6/25/2020 0741 by Layla Alexander RN  Outcome: Ongoing  6/25/2020 0329 by Krystal Willett  Outcome: Ongoing  Goal: Absence of physical injury  Description: Absence of physical injury  6/25/2020 0741 by Layla Alexander RN  Outcome: Ongoing  6/25/2020 0329 by Krystal Willett  Outcome: Ongoing     Problem: Infection:  Goal: Will remain free from infection  Description: Will remain free from infection  6/25/2020 0741 by Layla Alexander RN  Outcome: Ongoing  6/25/2020 0329 by Krystal Willett  Outcome: Ongoing     Problem: Safety:  Goal: Free from accidental physical injury  Description: Free from accidental physical injury  6/25/2020 0741 by Layla Alexander RN  Outcome: Ongoing  6/25/2020 0329 by Krystal Willett  Outcome: Ongoing  Goal: Free from intentional harm  Description: Free from intentional harm  6/25/2020 0741 by Layla Alexander RN  Outcome: Ongoing  6/25/2020 0329 by Krystal Willett  Outcome: Ongoing     Problem: Pain:  Goal: Patient's pain/discomfort is manageable  Description: Patient's pain/discomfort is manageable  6/25/2020 0741 by Layla Alexander RN  Outcome: Ongoing  6/25/2020 0329 by Krsytal Willett  Outcome: Ongoing  Goal: Pain level will decrease  Description: Pain level will decrease  6/25/2020 0741 by Layla Alexander RN  Outcome: Ongoing  6/25/2020 0329 by Krystal Willett  Outcome: Ongoing  Goal: Control of acute pain  Description: Control of acute pain  6/25/2020 0741 by Layla Alexander RN  Outcome: Ongoing  6/25/2020 0329 by Krystal Willett  Outcome: Ongoing  Goal: Control of chronic pain  Description: Control of chronic pain  6/25/2020 0741 by Layla Alexander RN  Outcome: Ongoing  6/25/2020 0329 by Krystal Willett  Outcome: Ongoing     Problem: Discharge Planning:  Goal: Patients continuum of care needs are met  Description: Patients continuum of care needs are met  6/25/2020 0741 by Velasquez Glass RN  Outcome: Ongoing  6/25/2020 0329 by Andrews Chisholm  Outcome: Ongoing     Problem: Nutrition  Goal: Optimal nutrition therapy  6/25/2020 0741 by Velasquez Glass RN  Outcome: Ongoing  6/25/2020 0329 by Andrews Chisholm  Outcome: Ongoing     Problem: Infection - Central Venous Catheter-Associated Bloodstream Infection:  Goal: Will show no infection signs and symptoms  Description: Will show no infection signs and symptoms  6/25/2020 0741 by Velasquez Glass RN  Outcome: Ongoing  6/25/2020 0329 by Andrews Chisholm  Outcome: Ongoing     Problem: Fluid Volume - Imbalance:  Goal: Absence of imbalanced fluid volume signs and symptoms  Description: Absence of imbalanced fluid volume signs and symptoms  6/25/2020 0741 by Velasquez Glass RN  Outcome: Ongoing  6/25/2020 0329 by Andrews Chisholm  Outcome: Ongoing

## 2020-06-25 NOTE — PLAN OF CARE
Nutrition Problem: Inadequate oral intake  Intervention: Food and/or Nutrient Delivery: Continue NPO, Modify current Tube Feeding  Nutritional Goals: Pt will tolerate the most appropriate nutrition support regimen to meet >75% of needs once initiated.

## 2020-06-25 NOTE — PROGRESS NOTES
Physical Therapy    Facility/Department: [facility] ICU  Initial Assessment/Treatment Note    NAME: Silva Mackey  : 1960  MRN: [id number]    Date of Service: 2020    Discharge Recommendations:  Silva Mackey scored a 14/24 on the AM-PAC short mobility form. Current research shows that an AM-PAC score of 18 or greater is typically associated with a discharge to the patient's home setting. Based on the patient's AM-PAC score and their current functional mobility deficits, it is recommended that the patient have 2-3 sessions per week of Physical Therapy at d/c to increase the patient's independence. At this time, this patient demonstrates the endurance and safety to discharge home with HHPT and a follow up treatment frequency of 2-3x/wk. Please see assessment section for further patient specific details. If patient discharges prior to next session this note will serve as a discharge summary. Please see below for the latest assessment towards goals. HOME HEALTH CARE: LEVEL 1 STANDARD    - Initial home health evaluation to occur within 24-48 hours, in patient home   - Therapy to evaluate with goal of regaining prior level of functioning   - Therapy to evaluate if patient has [address] needs for personal care          PT Equipment Recommendations  Equipment Needed: No    Assessment   Body structures, Functions, Activity limitations: Decreased functional mobility ; Decreased strength;Decreased endurance;Decreased posture;Decreased ROM; Decreased balance  Assessment: Pt is a 64 y.o. male with diagnosis of severe sepsis presenting with decreased functional mobility, strength, endurance, posture, ROM, and balance. Pt is currently requiring SBA for functional mobility, which is a decline from reported baseline. Despite low AMPAC score, pt lives at a w/c level at baseline.   PT anticipates good progress with continued therapy while in the hospital and recommends assist PRN with HHPT at d/c for increased safety. Treatment Diagnosis: decreased functional mobility due to severe sepsis  Prognosis: Good  Decision Making: Medium Complexity  Patient Education: Role of PT, goals, d/c recommendations, pt verbalized understanding  REQUIRES PT FOLLOW UP: Yes  Activity Tolerance  Activity Tolerance: Patient Tolerated treatment well       Patient Diagnosis(es): The primary encounter diagnosis was Sepsis, due to unspecified organism, unspecified whether acute organ dysfunction present ([facility] [address] Diagnoses of Pneumonia due to organism and Severe sepsis [facility]) were also pertinent to this visit. has a past medical history of Adult failure to thrive, Dysphagia, unspecified, Encounter for attention to gastrostomy [facility]), GERD without esophagitis, Hyperlipidemia, Hypertension, Legal blindness, as defined in [place], Long term (current) use of anticoagulants, Malignant neoplasm of oropharynx, unspecified ([facility] [address] Muscle weakness (generalized), Overactive bladder, Presence of prosthetic heart valve, Secondary and unspecified malignant neoplasm of lymph nodes of head, face and neck ([facility] [address] Unspecified severe protein-calorie malnutrition ([facility] [address] and Unsteadiness on feet.   has no past surgical history on file. Restrictions  Position Activity Restriction  Other position/activity restrictions: PEG tube  Vision/Hearing  Vision: Impaired  Vision Exceptions: Legally blind;Wears glasses at all times  Hearing: Exceptions to [facility]  Hearing Exceptions: Bilateral hearing aid     Subjective  General  Chart Reviewed: Yes  Patient assessed for rehabilitation services?: Yes  Additional Pertinent Hx: Pt is a 64 y.o. male admitted to [facility] on 6/21 with complaints of fever. From [facility] 6/21/2020 with fever and tachycardia in ED. 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   Family / Caregiver Present: No  Referring Practitioner: Stephanie King MD  Diagnosis: severe sepsis  Follows Commands: Within Functional Limits  General Comment  Comments: Pt found supine in bed upon PT arrival.  Pt agreeable to therapy session. Pt requiring increased time for all communication throughout session due to use of a communication board. Subjective  Subjective: \"I want to go home. \"  Pain Screening  Patient Currently in Pain: Denies  Vital Signs  Patient Currently in Pain: Yes(Pt reported 5/10 throat pain, RN notified)       Orientation  Orientation  Overall Orientation Status: Within Functional Limits  Social/Functional History  Social/Functional History  Lives With: Spouse  Type of Home: Apartment([facility], assisted living facility)  Home Layout: One level  Home Access: Level entry  Bathroom Shower/Tub: Walk-in shower  Bathroom Toilet: Handicap height  Bathroom Equipment: Built-in shower seat, Grab bars in shower, Grab bars around toilet  Bathroom Accessibility: Walker accessible  Home Equipment: Rolling walker, [employer], Wheelchair-electric, Quad cane(pt states that he has a modified walker with a platform for his L arm, has a motorized w/c and manual w/c)  ADL Assistance: [address] [place] [address]: Needs assistance([facility] assist with food)  Ambulation Assistance: Independent(motorized w/c)  Transfer Assistance: Independent  Active : No  Additional Comments: Was using a quad cane for mobility, but fell in 2016. The next day he began using the motorized w/c for his mobility. He has not walked since 2016. He has spasms in his L foot, which made it unsafe to use his RW. Pt reports 1 fall about 6 months ago when transferring from w/c to toilet. Has [facility] living at [facility], but hopes to d/c to [facility]. Was getting PT prior to admission.   Pt becoming frustrated during subjective history, stating that he felt confident to d/c home after leaving [facility] on previous admission, but he was sent to [facility] for Peg tube management. He feels that he can confidently manage his peg tube at this time. Cognition        Objective          AROM RLE (degrees)  RLE AROM: WFL  AROM LLE (degrees)  LLE AROM : Exceptions  LLE General AROM: grossly limited due to previous stroke  Strength RLE  Strength RLE: WFL  Strength LLE  Strength LLE: Exception  Comment: grossly limited due to previous stroke  Tone LLE  LLE Tone: Hypertonic     Bed mobility  Supine to Sit: Minimal assistance(HOB slightly elevated, assist for trunk placement. Pt with difficulty overcoming inflation of hospital bed, has regular bed at home.)  Scooting: Stand by assistance(to scoot EOB in sitting)  Transfers  Stand Pivot Transfers: Stand by assistance(EOB to recliner, to pt's R, increased time)  Ambulation  Ambulation?: No(unsafe to attempt, pt has not ambulated since 2016)  Stairs/Curb  Stairs?: No(unsafe to attempt)     Balance  Comments: SBA for dynamic sitting balance EOB for approximately 3 min with support of RUE and elbow of LUE to don hospital socks and gait belt. See OT note for dressing comments.         Plan   Plan  Times per week: 2-5  Times per day: Daily  Current Treatment Recommendations: Strengthening, Functional Mobility Training, Wheelchair Mobility Training, Manual Therapy - Joint Manipulation, Home Exercise Program, Equipment Evaluation, Education, & procurement, ROM, Transfer Training, Safety Education & Training, Balance Training, Endurance Training, Patient/Caregiver Education & Training  Safety Devices  Type of devices: Call light within reach, Chair alarm in place, Gait belt, Left in chair, Nurse notified    G-Code       OutComes Score                                                  AM-PAC Score  AM-PAC Inpatient Mobility Raw Score : 14 (06/25/20 1316)  AM-PAC Inpatient T-Scale Score : 38.1 (06/25/20 1316)  Mobility Inpatient CMS 0-100% Score: 61.29 (06/25/20 1316)  Mobility Inpatient CMS G-Code Modifier : CL (06/25/20 1316)          Goals  Short term goals  Time Frame for Short term goals: Discharge  Short term goal 1: Pt will perform all bed mobility with Erick  Short term goal 2: Pt will perform stand pivot transfer with Erick  Short term goal 3: Pt will tolerate 10-15 reps seated therex  Patient Goals   Patient goals : \"To go home to [facility] ([facility]. \"       Therapy Time   Individual Concurrent Group Co-treatment   Time In 0906         Time Out 1008         Minutes 62              Timed Code Treatment Minutes:   52    Total Treatment Minutes:  [address], PT    This note to serve as discharge summary if patient discharged before next session.

## 2020-06-25 NOTE — PROGRESS NOTES
Hospitalist Progress Note      PCP: No primary care provider on file. Date of Admission: 6/21/2020    Chief Complaint: hypotension, fever    Subjective:  Patient seen and examined at the bedside. No complaints at this time. Starting bolus tube feeds and doing very well with therapy. PFHS: reviewed as documented 6/21/2020, no changes      Medications:  Reviewed    Infusion Medications    heparin (porcine) 22 Units/kg/hr (06/25/20 0254)    sodium chloride 125 mL/hr at 06/25/20 0417     Scheduled Medications    famotidine  20 mg Oral BID    oxybutynin  5 mg Oral BID    sodium chloride  20 mL Intravenous Once    cefepime  2 g Intravenous Q8H    sodium chloride  250 mL Intravenous Once    aspirin  81 mg Oral Daily    atorvastatin  40 mg Oral Daily    sertraline  100 mg Oral Daily    tamsulosin  0.4 mg Oral Daily    sodium chloride flush  10 mL Intravenous 2 times per day    polyethylene glycol  17 g Oral Daily    sennosides-docusate sodium  1 tablet Oral BID     PRN Meds: potassium chloride, heparin (porcine), heparin (porcine), bisacodyl, dextran 70-hypromellose, magic (miracle) mouthwash, melatonin, traMADol, sodium chloride flush, acetaminophen **OR** acetaminophen, magnesium sulfate, ondansetron      Intake/Output Summary (Last 24 hours) at 6/25/2020 0954  Last data filed at 6/25/2020 0845  Gross per 24 hour   Intake 3672.55 ml   Output 2275 ml   Net 1397.55 ml         Physical Exam Performed:    /66   Pulse 78   Temp 98.4 °F (36.9 °C) (Oral)   Resp 30   Ht 5' 10\" (1.778 m)   Wt 142 lb 10.2 oz (64.7 kg)   SpO2 91%   BMI 20.47 kg/m²     General appearance:  No apparent distress, appears stated age  Eyes: Pupils equal, round, reactive to light, conjunctiva/corneas clear  Ears/Nose/Mouth/Throat: No external lesions or scars, hearing intact to voice, nasal cannula  Neck: Trachea midline, no masses noted  Respiratory:  Normal respiratory effort.  Diffuse wheezes  Cardiovascular: Regular rate and rhythm, nl S1/S2, w/o murmurs, rubs or gallops, no lower extremity edema  Abdomen: Soft, non-tender, non-distended, no hepatosplenomegaly  Musculoskeletal: No cyanosis, clubbing or petechiae, no lower extremity misalignment, asymmetry, or crepitation  Skin: Normal skin color, texture, turgor. No rashes or lesions noted. PEG in place  Psychiatric: Alert and oriented x4, good insight and judgment    Labs:   Recent Labs     06/23/20  0401  06/24/20  0300 06/24/20  0428 06/25/20  0710   WBC 3.9*  --  3.9*  --  3.6*   HGB 8.1*   < > 8.6* 8.7* 8.2*   HCT 23.1*   < > 25.1* 25.4* 24.1*   PLT 94*  --  83*  --  77*    < > = values in this interval not displayed. Recent Labs     06/23/20  0546 06/24/20  0300 06/25/20  0710    136 139   K 3.6 3.4* 3.6    106 108   CO2 23 25 27   BUN 21* 14 12   CREATININE 0.6* 0.5* <0.5*   CALCIUM 7.9* 7.9* 7.6*     No results for input(s): AST, ALT, BILIDIR, BILITOT, ALKPHOS in the last 72 hours. Recent Labs     06/23/20  0546 06/24/20  0300 06/25/20  0710   INR 2.87* 2.02* 1.26*     Recent Labs     06/22/20  1657 06/23/20  0003 06/23/20  0546   TROPONINI 0.23* 0.24* 0.26*       Urinalysis:      Lab Results   Component Value Date    NITRU Negative 06/21/2020    WBCUA 3-5 06/21/2020    BACTERIA 4+ 06/21/2020    RBCUA 3-4 06/21/2020    BLOODU MODERATE 06/21/2020    SPECGRAV 1.020 06/21/2020    GLUCOSEU Negative 06/21/2020       Radiology:  CT CHEST ABDOMEN PELVIS WO CONTRAST   Final Result      CHEST:      1.  Small right pleural effusion and multifocal pneumonia in the bilateral lungs with a light lower lobe predominance. 2.  Moderate cardiomegaly. ABDOMEN/PELVIS:      1. Rectal wall thickening representing proctitis. Rectum distended with stool. 2.  Colonic diverticulosis without evidence of diverticulitis. 3.  Cholelithiasis and mild gallbladder distention. 4.  Mild splenomegaly. 5.  Right nephrolithiasis.          XR CHEST PORTABLE   Final Result      Bilateral airspace disease concerning for pneumonia; Viral pneumonia should be considered. Assessment/Plan:    Active Hospital Problems    Diagnosis Date Noted    Septic shock ([facility] [A41.9, R65.21]     Severe sepsis ([facility] [A41.9, R65.20] 06/21/2020       Plan:    # Severe sepsis due to multifocal PNA, likely gram positive  -COVID negative  -Repeat cultures growing staph aureus  -Off pressors  -Continues on vancomycin, cefepime  -ID consulted     # AOC diastolic HF  -Elevated proBNP  -s/p sepsis fluid bolus  -Echo complete     # AOC anemia, thrombocytopenia  -s/p transfusion of 1u pRBC  -appreciate GI input, no evidence of blood loss, likely hemolytic/sonsumtive process related to acute infection  -hgb trending down, now 8.2     # NSTEMI  -Likely type 2  -s/p fluids, pressors     # Constipation  -scheduled colace and senna     # h/o stage 4 SCC tongue base  -Cisplatin  -s/p radiation  -Palliative care consulted     # h/o Mechanical AV in 2000  -heparin gtt     # CAD s/p Stent 1997  # CVA w/ residual L hemiparesis, dysarthria, visual deficit   -Continue ASA     # GERD  -IV protonix     # BPH  -Continue flomax     # Overactive bladder  oxybutynin     # HL  -Continue Lipitor    DVT Prophylaxis: heparin gtt  Diet: DIET TUBE FEED CONTINUOUS/CYCLIC NPO; STANDARD WITH FIBER (jevity 1.2);  Gastrostomy; Continuous; 25; 50; 24  Dietary Nutrition Supplements: Protein Modular  Code Status: Full Code    PT/OT Eval Status: ongoing    Dispo - Faby Ricketts MD

## 2020-06-25 NOTE — FLOWSHEET NOTE
06/25/20 1500   Gastrostomy/Enterostomy/Jejunostomy Percutaneous Endoscopic Gastrostomy (PEG)   No Placement Date or Time found. Type: Percutaneous Endoscopic Gastrostomy (PEG)   Drain Status Bolus   Tube Feeding Standard with Fiber  (Jevity 1.5 )   Tube Feeding Intake (mL) 237 ml   Free Water Flush (mL) 75 mL     Patient able to adjust back to bolus feeds with minimal assist & insertion of Jevity 1.5 with 237 mL instilled q3h scheduled. Patient able to give bolus feeds via self with feeding to gravity during bolus feeding. No s/s of discomfort or abdominal pain while feeding ongoing. Patient continued to be up in chair watching television with VSS on 3 L HFNC & chair alarm ongoing in use. Patient call light within reach & all needs addressed at this time. Updated on plan of care for day with all questions answered.

## 2020-06-25 NOTE — PLAN OF CARE
Problem: Falls - Risk of:  Goal: Will remain free from falls  Description: Will remain free from falls  Outcome: Ongoing  Goal: Absence of physical injury  Description: Absence of physical injury  Outcome: Ongoing     Problem: Infection:  Goal: Will remain free from infection  Description: Will remain free from infection  Outcome: Ongoing     Problem: Safety:  Goal: Free from accidental physical injury  Description: Free from accidental physical injury  Outcome: Ongoing  Goal: Free from intentional harm  Description: Free from intentional harm  Outcome: Ongoing     Problem: Pain:  Goal: Patient's pain/discomfort is manageable  Description: Patient's pain/discomfort is manageable  Outcome: Ongoing  Goal: Pain level will decrease  Description: Pain level will decrease  Outcome: Ongoing  Goal: Control of acute pain  Description: Control of acute pain  Outcome: Ongoing  Goal: Control of chronic pain  Description: Control of chronic pain  Outcome: Ongoing     Problem: Discharge Planning:  Goal: Patients continuum of care needs are met  Description: Patients continuum of care needs are met  Outcome: Ongoing     Problem: Nutrition  Goal: Optimal nutrition therapy  Outcome: Ongoing     Problem: Infection - Central Venous Catheter-Associated Bloodstream Infection:  Goal: Will show no infection signs and symptoms  Description: Will show no infection signs and symptoms  Outcome: Ongoing     Problem: Fluid Volume - Imbalance:  Goal: Absence of imbalanced fluid volume signs and symptoms  Description: Absence of imbalanced fluid volume signs and symptoms  Outcome: Ongoing

## 2020-06-26 LAB
ANION GAP SERPL CALCULATED.3IONS-SCNC: 7 MMOL/L (ref 3–16)
ANISOCYTOSIS: ABNORMAL
ANTI-XA UNFRAC HEPARIN: 0.34 IU/ML (ref 0.3–0.7)
ANTI-XA UNFRAC HEPARIN: 0.38 IU/ML (ref 0.3–0.7)
BASOPHILS ABSOLUTE: 0 K/UL (ref 0–0.2)
BASOPHILS RELATIVE PERCENT: 1 %
BLOOD CULTURE, ROUTINE: NORMAL
BUN BLDV-MCNC: 12 MG/DL (ref 7–20)
CALCIUM SERPL-MCNC: 7.6 MG/DL (ref 8.3–10.6)
CHLORIDE BLD-SCNC: 106 MMOL/L (ref 99–110)
CO2: 27 MMOL/L (ref 21–32)
CREAT SERPL-MCNC: <0.5 MG/DL (ref 0.9–1.3)
EOSINOPHILS ABSOLUTE: 0 K/UL (ref 0–0.6)
EOSINOPHILS RELATIVE PERCENT: 1 %
GFR AFRICAN AMERICAN: >60
GFR NON-AFRICAN AMERICAN: >60
GLUCOSE BLD-MCNC: 96 MG/DL (ref 70–99)
HCT VFR BLD CALC: 23.2 % (ref 40.5–52.5)
HEMOGLOBIN: 8.2 G/DL (ref 13.5–17.5)
HYPOCHROMIA: ABNORMAL
INR BLD: 1.29 (ref 0.86–1.14)
LYMPHOCYTES ABSOLUTE: 0.2 K/UL (ref 1–5.1)
LYMPHOCYTES RELATIVE PERCENT: 6 %
MAGNESIUM: 1.9 MG/DL (ref 1.8–2.4)
MCH RBC QN AUTO: 30.9 PG (ref 26–34)
MCHC RBC AUTO-ENTMCNC: 35.2 G/DL (ref 31–36)
MCV RBC AUTO: 87.9 FL (ref 80–100)
MONOCYTES ABSOLUTE: 0.5 K/UL (ref 0–1.3)
MONOCYTES RELATIVE PERCENT: 14 %
NEUTROPHILS ABSOLUTE: 2.6 K/UL (ref 1.7–7.7)
NEUTROPHILS RELATIVE PERCENT: 78 %
PDW BLD-RTO: 17.7 % (ref 12.4–15.4)
PLATELET # BLD: 77 K/UL (ref 135–450)
PMV BLD AUTO: 7.8 FL (ref 5–10.5)
POTASSIUM REFLEX MAGNESIUM: 3.5 MMOL/L (ref 3.5–5.1)
PROTHROMBIN TIME: 15 SEC (ref 10–13.2)
RBC # BLD: 2.64 M/UL (ref 4.2–5.9)
SODIUM BLD-SCNC: 140 MMOL/L (ref 136–145)
WBC # BLD: 3.3 K/UL (ref 4–11)

## 2020-06-26 PROCEDURE — 6360000002 HC RX W HCPCS: Performed by: INTERNAL MEDICINE

## 2020-06-26 PROCEDURE — 6370000000 HC RX 637 (ALT 250 FOR IP): Performed by: STUDENT IN AN ORGANIZED HEALTH CARE EDUCATION/TRAINING PROGRAM

## 2020-06-26 PROCEDURE — 83735 ASSAY OF MAGNESIUM: CPT

## 2020-06-26 PROCEDURE — 6360000002 HC RX W HCPCS: Performed by: STUDENT IN AN ORGANIZED HEALTH CARE EDUCATION/TRAINING PROGRAM

## 2020-06-26 PROCEDURE — 85610 PROTHROMBIN TIME: CPT

## 2020-06-26 PROCEDURE — 2700000000 HC OXYGEN THERAPY PER DAY

## 2020-06-26 PROCEDURE — 2580000003 HC RX 258: Performed by: STUDENT IN AN ORGANIZED HEALTH CARE EDUCATION/TRAINING PROGRAM

## 2020-06-26 PROCEDURE — 36415 COLL VENOUS BLD VENIPUNCTURE: CPT

## 2020-06-26 PROCEDURE — 1200000000 HC SEMI PRIVATE

## 2020-06-26 PROCEDURE — 85520 HEPARIN ASSAY: CPT

## 2020-06-26 PROCEDURE — 80048 BASIC METABOLIC PNL TOTAL CA: CPT

## 2020-06-26 PROCEDURE — 2580000003 HC RX 258: Performed by: INTERNAL MEDICINE

## 2020-06-26 PROCEDURE — 94761 N-INVAS EAR/PLS OXIMETRY MLT: CPT

## 2020-06-26 PROCEDURE — 99233 SBSQ HOSP IP/OBS HIGH 50: CPT | Performed by: INTERNAL MEDICINE

## 2020-06-26 PROCEDURE — 85025 COMPLETE CBC W/AUTO DIFF WBC: CPT

## 2020-06-26 PROCEDURE — 6370000000 HC RX 637 (ALT 250 FOR IP): Performed by: INTERNAL MEDICINE

## 2020-06-26 RX ORDER — LIDOCAINE HYDROCHLORIDE 10 MG/ML
5 INJECTION, SOLUTION EPIDURAL; INFILTRATION; INTRACAUDAL; PERINEURAL ONCE
Status: DISCONTINUED | OUTPATIENT
Start: 2020-06-26 | End: 2020-06-26 | Stop reason: SDUPTHER

## 2020-06-26 RX ORDER — SODIUM CHLORIDE 0.9 % (FLUSH) 0.9 %
10 SYRINGE (ML) INJECTION EVERY 12 HOURS SCHEDULED
Status: DISCONTINUED | OUTPATIENT
Start: 2020-06-26 | End: 2020-07-01 | Stop reason: HOSPADM

## 2020-06-26 RX ORDER — LIDOCAINE HYDROCHLORIDE 10 MG/ML
5 INJECTION, SOLUTION EPIDURAL; INFILTRATION; INTRACAUDAL; PERINEURAL ONCE
Status: DISCONTINUED | OUTPATIENT
Start: 2020-06-26 | End: 2020-07-01 | Stop reason: HOSPADM

## 2020-06-26 RX ORDER — SODIUM CHLORIDE 0.9 % (FLUSH) 0.9 %
10 SYRINGE (ML) INJECTION PRN
Status: DISCONTINUED | OUTPATIENT
Start: 2020-06-26 | End: 2020-07-01 | Stop reason: HOSPADM

## 2020-06-26 RX ORDER — POTASSIUM CHLORIDE 29.8 MG/ML
20 INJECTION INTRAVENOUS ONCE
Status: DISCONTINUED | OUTPATIENT
Start: 2020-06-26 | End: 2020-07-01 | Stop reason: HOSPADM

## 2020-06-26 RX ORDER — POTASSIUM CHLORIDE 29.8 MG/ML
20 INJECTION INTRAVENOUS ONCE
Status: COMPLETED | OUTPATIENT
Start: 2020-06-26 | End: 2020-06-26

## 2020-06-26 RX ORDER — SODIUM CHLORIDE 0.9 % (FLUSH) 0.9 %
10 SYRINGE (ML) INJECTION EVERY 12 HOURS SCHEDULED
Status: DISCONTINUED | OUTPATIENT
Start: 2020-06-26 | End: 2020-06-26 | Stop reason: SDUPTHER

## 2020-06-26 RX ORDER — MAGNESIUM SULFATE 1 G/100ML
1 INJECTION INTRAVENOUS ONCE
Status: COMPLETED | OUTPATIENT
Start: 2020-06-26 | End: 2020-06-26

## 2020-06-26 RX ORDER — SODIUM CHLORIDE 0.9 % (FLUSH) 0.9 %
10 SYRINGE (ML) INJECTION PRN
Status: DISCONTINUED | OUTPATIENT
Start: 2020-06-26 | End: 2020-06-26 | Stop reason: SDUPTHER

## 2020-06-26 RX ADMIN — MAGNESIUM SULFATE HEPTAHYDRATE 1 G: 1 INJECTION, SOLUTION INTRAVENOUS at 14:15

## 2020-06-26 RX ADMIN — SERTRALINE HYDROCHLORIDE 100 MG: 100 TABLET ORAL at 09:09

## 2020-06-26 RX ADMIN — HEPARIN SODIUM 26 UNITS/KG/HR: 10000 INJECTION, SOLUTION INTRAVENOUS at 05:18

## 2020-06-26 RX ADMIN — CEFAZOLIN SODIUM 2 G: 1 POWDER, FOR SOLUTION INTRAMUSCULAR; INTRAVENOUS at 02:45

## 2020-06-26 RX ADMIN — ONDANSETRON 4 MG: 2 INJECTION INTRAMUSCULAR; INTRAVENOUS at 16:32

## 2020-06-26 RX ADMIN — HEPARIN SODIUM 2600 UNITS: 5000 INJECTION INTRAVENOUS; SUBCUTANEOUS at 00:55

## 2020-06-26 RX ADMIN — ATORVASTATIN CALCIUM 40 MG: 40 TABLET, FILM COATED ORAL at 09:03

## 2020-06-26 RX ADMIN — HEPARIN SODIUM 26 UNITS/KG/HR: 10000 INJECTION, SOLUTION INTRAVENOUS at 22:25

## 2020-06-26 RX ADMIN — FAMOTIDINE 20 MG: 20 TABLET ORAL at 22:44

## 2020-06-26 RX ADMIN — CEFAZOLIN SODIUM 2 G: 1 POWDER, FOR SOLUTION INTRAMUSCULAR; INTRAVENOUS at 19:09

## 2020-06-26 RX ADMIN — WARFARIN SODIUM 11 MG: 5 TABLET ORAL at 09:03

## 2020-06-26 RX ADMIN — FAMOTIDINE 20 MG: 20 TABLET ORAL at 09:05

## 2020-06-26 RX ADMIN — OXYBUTYNIN CHLORIDE 5 MG: 5 TABLET ORAL at 22:45

## 2020-06-26 RX ADMIN — Medication 10 ML: at 22:44

## 2020-06-26 RX ADMIN — TAMSULOSIN HYDROCHLORIDE 0.4 MG: 0.4 CAPSULE ORAL at 09:05

## 2020-06-26 RX ADMIN — ONDANSETRON 4 MG: 2 INJECTION INTRAMUSCULAR; INTRAVENOUS at 09:21

## 2020-06-26 RX ADMIN — OXYBUTYNIN CHLORIDE 5 MG: 5 TABLET ORAL at 09:05

## 2020-06-26 RX ADMIN — ASPIRIN 81 MG: 81 TABLET, COATED ORAL at 09:05

## 2020-06-26 RX ADMIN — POLYETHYLENE GLYCOL (3350) 17 G: 17 POWDER, FOR SOLUTION ORAL at 09:08

## 2020-06-26 RX ADMIN — Medication 5 ML: at 16:51

## 2020-06-26 RX ADMIN — DOCUSATE SODIUM 50 MG AND SENNOSIDES 8.6 MG 1 TABLET: 8.6; 5 TABLET, FILM COATED ORAL at 22:44

## 2020-06-26 RX ADMIN — CEFAZOLIN SODIUM 2 G: 1 POWDER, FOR SOLUTION INTRAMUSCULAR; INTRAVENOUS at 11:32

## 2020-06-26 RX ADMIN — POTASSIUM CHLORIDE 20 MEQ: 29.8 INJECTION, SOLUTION INTRAVENOUS at 16:34

## 2020-06-26 RX ADMIN — Medication 10 ML: at 09:05

## 2020-06-26 ASSESSMENT — PAIN DESCRIPTION - FREQUENCY: FREQUENCY: CONTINUOUS

## 2020-06-26 ASSESSMENT — PAIN DESCRIPTION - PAIN TYPE: TYPE: ACUTE PAIN

## 2020-06-26 ASSESSMENT — PAIN DESCRIPTION - LOCATION: LOCATION: THROAT

## 2020-06-26 ASSESSMENT — PAIN SCALES - GENERAL
PAINLEVEL_OUTOF10: 0
PAINLEVEL_OUTOF10: 5
PAINLEVEL_OUTOF10: 0
PAINLEVEL_OUTOF10: 0

## 2020-06-26 ASSESSMENT — PAIN - FUNCTIONAL ASSESSMENT: PAIN_FUNCTIONAL_ASSESSMENT: PREVENTS OR INTERFERES SOME ACTIVE ACTIVITIES AND ADLS

## 2020-06-26 ASSESSMENT — PAIN DESCRIPTION - ONSET: ONSET: ON-GOING

## 2020-06-26 ASSESSMENT — PAIN DESCRIPTION - PROGRESSION: CLINICAL_PROGRESSION: GRADUALLY WORSENING

## 2020-06-26 ASSESSMENT — PAIN DESCRIPTION - DESCRIPTORS: DESCRIPTORS: CONSTANT

## 2020-06-26 NOTE — PROGRESS NOTES
Occupational Therapy  Facility/Department: [address]  Daily Treatment Note  NAME: Arnulfo Coleman  : 1960  MRN: [id number]    Date of Service: 2020    Discharge Recommendations:Stanley Perez scored a 16/24 on the AM-PAC ADL Inpatient form. Current research shows that an AM-PAC score of 18 or greater is typically associated with a discharge to the patient's home setting. Based on the patient's AM-PAC score, and their current ADL deficits, it is recommended that the patient have 2-3 sessions per week of Occupational Therapy at d/c to increase the patient's independence. At this time, this patient demonstrates the endurance and safety to discharge home with home services and a follow up treatment frequency of 2-3x/wk. Please see assessment section for further patient specific details. If patient discharges prior to next session this note will serve as a discharge summary. Please see below for the latest assessment towards goals. HOME HEALTH CARE: LEVEL 1 STANDARD    - Initial home health evaluation to occur within 24-48 hours, in patient home   - Therapy to evaluate with goal of regaining prior level of functioning   - Therapy to evaluate if patient has [address] needs for personal care              Assessment   Performance deficits / Impairments: Decreased functional mobility ; Decreased ADL status  Assessment: Pt appears to be close to his baseline. Pt usually transfers to/from  independently, and reports being independent with ADL. In session pt req MaxA for toileting at [facility] - pt self-reports at home (w/ personal eqpt and set-up) he would be IND w/ this task at this time. He required steadying assist in clothing mgt, assist to remove brief, and DEP for jhony-care while in standing.  Cont per POC  Treatment Diagnosis: Impaired functional mobility and ADL  Prognosis: Good  OT Education: OT Role  Patient Education: Role of OT: Pt verbalized understanding  Activity Tolerance  Activity Tolerance: Patient Tolerated treatment well  Safety Devices  Safety Devices in place: Yes  Type of devices: Call light within reach; Left in bed;Bed alarm in place;Nurse notified         Patient Diagnosis(es): The primary encounter diagnosis was Sepsis, due to unspecified organism, unspecified whether acute organ dysfunction present [facility]). Diagnoses of Pneumonia due to organism and Severe sepsis [facility]) were also pertinent to this visit. has a past medical history of Adult failure to thrive, Dysphagia, unspecified, Encounter for attention to gastrostomy [facility]), GERD without esophagitis, Hyperlipidemia, Hypertension, Legal blindness, as defined in [place], Long term (current) use of anticoagulants, Malignant neoplasm of oropharynx, unspecified ([facility] Muscle weakness (generalized), Overactive bladder, Presence of prosthetic heart valve, Secondary and unspecified malignant neoplasm of lymph nodes of head, face and neck ([facility] Unspecified severe protein-calorie malnutrition ([facility] and Unsteadiness on feet.   has no past surgical history on file. Restrictions  Position Activity Restriction  Other position/activity restrictions: PEG tube, NPO  Subjective   General  Chart Reviewed: Yes  Patient assessed for rehabilitation services?: Yes  Additional Pertinent Hx: Pt admitted from [facility]/21/2020 with fever and tachycardia. 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 Garcia  Diagnosis: Severe Sepsis  Subjective  Subjective: Pt sidelying in bed on OT arrival, agreed to tx - requesting toileting  General Comment  Comments: Pt in session reporting wanting to retn to [address] [place] [address] Pt. Reports that it is [place] and [place] and he would have any help he needs.    Vital Signs  Patient Currently in Pain: Denies Orientation  Orientation  Overall Orientation Status: Within Functional Limits  Objective    ADL  LE Dressing:  Moderate assistance(pt donned footies EOB w/ SBA, doffed brief w/ Ria, donned brief w/ Ria)  Toileting: Maximum assistance(Pt req assist in clothing mgt and jhony-care w/ toileting after continent BM)  Additional Comments: Pt required asssit w/ all steps of toileting assist to pull down brief, DEP pericare, steadying assist in standing while he pulled up brief        Balance  Sitting Balance: Stand by assistance  Standing Balance: Minimal assistance  Standing Balance  Time: ~1min 2x  Activity: during jhony-care and clothign mgt for toietling  Bed mobility  Supine to Sit: Stand by assistance  Sit to Supine: Stand by assistance  Scooting: Stand by assistance  Comment: SBA for line mgt during bed mobility  Transfers  Stand Pivot Transfers: Stand by assistance(EOB>BSC at R)  Sit to stand: Stand by assistance  Stand to sit: Stand by assistance                                                                 Plan   Plan  Times per week: 2-5  Current Treatment Recommendations: Balance Training, Functional Mobility Training, Endurance Training, Self-Care / ADL    AM-[facility] Score        [facility] Inpatient Daily Activity Raw Score: 16 (06/26/20 1543)  AM-PAC Inpatient ADL T-Scale Score : 35.96 (06/26/20 1543)  ADL Inpatient CMS 0-100% Score: 53.32 (06/26/20 1543)  ADL Inpatient CMS G-Code Modifier : CK (06/26/20 1543)    Goals  Short term goals  Time Frame for Short term goals: Discharge  Short term goal 1: Transfer to/from [facility] with supervision  Short term goal 2: Toileting Ria  Patient Goals   Patient goals : Discharge to [address] Time   Individual Concurrent Group Co-treatment   Time In 5228         Time Out 1535         Minutes 46         Timed Code Treatment Minutes: [facility]-OTR/L [id number]

## 2020-06-26 NOTE — PLAN OF CARE
Problem: Falls - Risk of:  Goal: Will remain free from falls  Description: Will remain free from falls  Outcome: Ongoing     Problem: Infection:  Goal: Will remain free from infection  Description: Will remain free from infection  Outcome: Ongoing - Hands washed, gloved donned, sterile water used for flush in Gtube, alcohol caps used on all IV tubing and IV ports     Problem: Safety:  Goal: Free from intentional harm  Description: Free from intentional harm  Outcome: Ongoing - patient does not express desire for self harm.       Problem: Discharge Planning:  Goal: Patients continuum of care needs are met  Description: Patients continuum of care needs are met  Outcome: Ongoing     Problem: Fluid Volume - Imbalance:  Goal: Absence of imbalanced fluid volume signs and symptoms  Description: Absence of imbalanced fluid volume signs and symptoms  Outcome: Ongoing - patient's weight is down, lung fields are clear, patient ambulating well

## 2020-06-26 NOTE — DISCHARGE INSTR - COC
Continuity of Care Form    Patient Name: Diane Ralph   :  1960  MRN:  [id number]    Admit date:  2020  Discharge date:      Code Status Order: Full Code   Advance Directives:   [address] [facility] Documentation     Date/Time Healthcare Directive Type of Healthcare Directive Copy in [address] [facility] [address] Agent's Name Healthcare Agent's Phone Number    [phone]  Yes, patient has an advance directive for healthcare treatment  Durable power of  for health care  No, copy requested from family  Adult [facility]  --          Admitting Physician:  Delisa Espino MD  PCP: No primary care provider on file. Discharging Nurse: [facility] Unit/Room#: 1511/8113-13  Discharging Unit Phone Number: ***    Emergency Contact:   Extended Emergency Contact Information  Primary Emergency Contact: Olivia Lindsay  Mobile Phone: [phone]  Relation: Child   needed? No  Secondary Emergency Contact: Дмитрий Rinaldi, [address] [place] [address] Phone: [phone]  Mobile Phone: [phone]  Relation: Brother/Sister    Past Surgical History:  No past surgical history on file. Immunization History: There is no immunization history on file for this patient.     Active Problems:  Patient Active Problem List   Diagnosis Code    Severe sepsis ([address] A41.9, R65.20    Septic shock ([address] A41.9, R65.21    Staphylococcus aureus bacteremia R78.81    H/O mechanical aortic valve replacement Z95.2    Tongue cancer ([facility] C02.9       Isolation/Infection:   Isolation          No Isolation        Patient Infection Status     Infection Onset Added Last Indicated Last Indicated By Review Planned Expiration Resolved Resolved By    COVID-19 Rule Out 20 COVID-19 (Ordered)        Resolved    COVID-19 Rule Out 20 COVID-19 (Ordered)   20 Rule-Out Test Resulted    COVID-19 Rule Out 20 COVID-19 (Ordered) 06/21/20 Rule-Out Test Resulted          Nurse Assessment:  Last Vital Signs: BP (!) 128/57   Pulse 65   Temp 97.3 °F (36.3 °C) (Oral)   Resp 18   Ht 5' 10\" (1.778 m)   Wt 145 lb 1 oz (65.8 kg)   SpO2 94%   BMI 20.81 kg/m²     Last documented pain score (0-10 scale): Pain Level: 5  Last Weight:   Wt Readings from Last 1 Encounters:   06/26/20 145 lb 1 oz (65.8 kg)     Mental Status:  oriented and alert    IV Access:  PICC R upper arm inserted 7/1/20    Nursing Mobility/ADLs:  Walking   assisted  Transfer  Assisted  Bathing  Assisted  Dressing  Assisted  Toileting  Assisted  Feeding  Dependent  Med Admin  Dependent  Med Delivery   PEG tube    Wound Care Documentation and Therapy:        Elimination:  Continence:   · Bowel: No  · Bladder: No  Urinary Catheter: None   Colostomy/Ileostomy/Ileal Conduit: No       Date of Last BM: 6/26/2020    Intake/Output Summary (Last 24 hours) at 6/26/2020 1625  Last data filed at 6/26/2020 1130  Gross per 24 hour   Intake 1785.4 ml   Output 2200 ml   Net -414.6 ml     I/O last 3 completed shifts: In: 1785.4 [I.V.:736.4; NG/GT:999; IV Piggyback:50]  Out: 2200 [Urine:2200]    Safety Concerns: At Risk for Falls, Aspiration risk    Impairments/Disabilities:      Speech and Hearing    Nutrition Therapy:  Current Nutrition Therapy:   - Tube Feedings:  Standard with fiber. Jevity 1.5 with fiber (bolus feeds by gravity 6am, 12pm, 3pm, 6pm, 9pm *as pt tolerates*, 75ml water flush pre and post bolus)    Routes of Feeding: Gastrostomy Tube, General diet w/ thin liquids (single sips) as pt tolerates with SLP  Liquids: NPO  Daily Fluid Restriction: no  Last Modified Barium Swallow with Video (Video Swallowing Test): Done 6/30/20    Treatments at the Time of Hospital Discharge:   Respiratory Treatments: N/A  Oxygen Therapy:  is on oxygen at 2 L/min per nasal cannula.   Ventilator:    - No ventilator support    Rehab Therapies: Physical Therapy and Occupational Therapy  Weight Bearing Status/Restrictions: No weight bearing restirctions  Other Medical Equipment (for information only, NOT a DME order):  hospital bed  Other Treatments: ***    Patient's personal belongings (please select all that are sent with patient):  Hearing Aides bilateral, glasses, clothing, shoes, laptop + , laptop bag, duffle bag     RN SIGNATURE:  Electronically signed by Clarissa Costello RN on 6/28/20 at 5:52 PM EDT    CASE MANAGEMENT/SOCIAL WORK SECTION    Inpatient Status Date: 6/27/2020    Readmission Risk Assessment Score:  Readmission Risk              Risk of Unplanned Readmission:        22           Discharging to Facility/ Agency   Name: Soraida Farrar  Phone [phone]  Fax [phone]    / signature: Electronically signed by Collette Antony RN on 7/1/2020 at 11:51 AM      PHYSICIAN SECTION    Prognosis: Good    Condition at Discharge: Stable    Rehab Potential (if transferring to Rehab): Good    Recommended Labs or Other Treatments After Discharge:   -Follow up with PCP in 1 week for hospital follow up and to order transesophageal echo  -continue AB as ordered  - monitor INR. Continue lovenox BID until warfarin is therapeutic.  -Follow up with oncology      INFUSION ORDERS:  Ancef 2 gm iv q 8 hr through 7/20/20  - First dose given in hospital  - PICC   - Disposition / date discharge  - Check CBC w diff, CMP, ESR, CRP every Mon or Tue - FAX result to [phone]  - Call with antibiotic / infusion issues, [phone]  - Call any change in status, transfer out of facility or to hospital - [phone]  - No f/u in outpatient ID office necessary    Physician Certification: I certify the above information and transfer of Williams Lucero  is necessary for the continuing treatment of the diagnosis listed and that he requires [address] for greater 30 days.      Update Admission H&P: No change in H&P    PHYSICIAN SIGNATURE:  Electronically signed by Manasa Willson MD on 07/01/20 at 8:40 AM EDT

## 2020-06-26 NOTE — PROGRESS NOTES
Hospitalist Progress Note      PCP: No primary care provider on file. Date of Admission: 6/21/2020    Chief Complaint: hypotension, fever    Subjective:  Patient seen and examined at the bedside. Doing well with tube feeds. PICC to be replaced today.     PFHS: reviewed as documented 6/21/2020, no changes    Medications:  Reviewed    Infusion Medications    heparin (porcine) 26 Units/kg/hr (06/26/20 0518)     Scheduled Medications    potassium chloride  20 mEq Intravenous Once    potassium chloride  20 mEq Intravenous Once    magnesium sulfate  1 g Intravenous Once    lidocaine 1 % injection  5 mL Intradermal Once    sodium chloride flush  10 mL Intravenous 2 times per day    warfarin  11 mg Oral Daily    ceFAZolin  2 g Intravenous Q8H    famotidine  20 mg Oral BID    oxybutynin  5 mg Oral BID    sodium chloride  20 mL Intravenous Once    sodium chloride  250 mL Intravenous Once    aspirin  81 mg Oral Daily    atorvastatin  40 mg Oral Daily    sertraline  100 mg Oral Daily    tamsulosin  0.4 mg Oral Daily    polyethylene glycol  17 g Oral Daily    sennosides-docusate sodium  1 tablet Oral BID     PRN Meds: perflutren lipid microspheres, sodium chloride flush, potassium chloride, heparin (porcine), heparin (porcine), bisacodyl, dextran 70-hypromellose, magic (miracle) mouthwash, melatonin, traMADol, acetaminophen **OR** acetaminophen, magnesium sulfate, ondansetron      Intake/Output Summary (Last 24 hours) at 6/26/2020 1135  Last data filed at 6/26/2020 0700  Gross per 24 hour   Intake 3056.33 ml   Output 2475 ml   Net 581.33 ml         Physical Exam Performed:    BP 99/79   Pulse 77   Temp 98.4 °F (36.9 °C) (Oral)   Resp 17   Ht 5' 10\" (1.778 m)   Wt 145 lb 1 oz (65.8 kg)   SpO2 93%   BMI 20.81 kg/m²     General appearance:  No apparent distress, appears stated age  Eyes: Pupils equal, round, reactive to light, conjunctiva/corneas clear  Ears/Nose/Mouth/Throat: No external lesions or scars, hearing intact to voice, nasal cannula  Neck: Trachea midline, no masses noted  Respiratory:  Normal respiratory effort. Diffuse wheezes  Cardiovascular: Regular rate and rhythm, nl S1/S2, w/o murmurs, rubs or gallops, no lower extremity edema  Abdomen: Soft, non-tender, non-distended, no hepatosplenomegaly  Musculoskeletal: No cyanosis, clubbing or petechiae, no lower extremity misalignment, asymmetry, or crepitation  Skin: Normal skin color, texture, turgor. No rashes or lesions noted. PEG in place  Psychiatric: Alert and oriented x4, good insight and judgment    Labs:   Recent Labs     06/24/20  0300 06/24/20  0428 06/25/20  0710 06/26/20  0512   WBC 3.9*  --  3.6* 3.3*   HGB 8.6* 8.7* 8.2* 8.2*   HCT 25.1* 25.4* 24.1* 23.2*   PLT 83*  --  77* 77*     Recent Labs     06/24/20  0300 06/25/20  0710 06/26/20  0512    139 140   K 3.4* 3.6 3.5    108 106   CO2 25 27 27   BUN 14 12 12   CREATININE 0.5* <0.5* <0.5*   CALCIUM 7.9* 7.6* 7.6*     No results for input(s): AST, ALT, BILIDIR, BILITOT, ALKPHOS in the last 72 hours. Recent Labs     06/24/20  0300 06/25/20  0710 06/26/20  0512   INR 2.02* 1.26* 1.29*     No results for input(s): CKTOTAL, TROPONINI in the last 72 hours. Urinalysis:      Lab Results   Component Value Date    NITRU Negative 06/21/2020    WBCUA 3-5 06/21/2020    BACTERIA 4+ 06/21/2020    RBCUA 3-4 06/21/2020    BLOODU MODERATE 06/21/2020    SPECGRAV 1.020 06/21/2020    GLUCOSEU Negative 06/21/2020       Radiology:  CT CHEST ABDOMEN PELVIS WO CONTRAST   Final Result      CHEST:      1.  Small right pleural effusion and multifocal pneumonia in the bilateral lungs with a light lower lobe predominance. 2.  Moderate cardiomegaly. ABDOMEN/PELVIS:      1. Rectal wall thickening representing proctitis. Rectum distended with stool. 2.  Colonic diverticulosis without evidence of diverticulitis. 3.  Cholelithiasis and mild gallbladder distention. 4.  Mild splenomegaly.    5.

## 2020-06-26 NOTE — PROGRESS NOTES
ID Follow-up NOTE    CC:   MSSA bacteremia  Antibiotics: Ancef    Admit Date: 6/21/2020  Hospital Day: 6    Subjective:     Patient feels good, less throat / neck      Objective:     Patient Vitals for the past 8 hrs:   BP Temp Temp src Pulse Resp SpO2   06/26/20 1350 (!) 128/57 97.3 °F (36.3 °C) Oral 65 18 94 %   06/26/20 1100 99/79 -- -- 77 17 93 %   06/26/20 1000 136/70 -- -- 68 8 96 %   06/26/20 0917 -- -- -- -- 15 95 %   06/26/20 0900 (!) 139/57 98.4 °F (36.9 °C) Oral 71 20 98 %     I/O last 3 completed shifts: In: 1785.4 [I.V.:736.4; NG/GT:999; IV Piggyback:50]  Out: 2200 [Urine:2200]  No intake/output data recorded. EXAM:  GENERAL: No apparent distress.   RA  HEENT: Membranes moist, no oral lesion - L external neck with redness, scab  NECK:  Supple, no lymphadenopathy  LUNGS: Clear b/l, no rales, no dullness  CARDIAC: RRR, no murmur appreciated  ABD:  + BS, soft / NT - G-tube in place  EXT:  No rash, no edema, no lesions  NEURO: No focal neurologic findings  PSYCH: Orientation, sensorium, mood normal  LINES:  R PICC in place       Data Review:  Lab Results   Component Value Date    WBC 3.3 (L) 06/26/2020    HGB 8.2 (L) 06/26/2020    HCT 23.2 (L) 06/26/2020    MCV 87.9 06/26/2020    PLT 77 (L) 06/26/2020     Lab Results   Component Value Date    CREATININE <0.5 (L) 06/26/2020    BUN 12 06/26/2020     06/26/2020    K 3.5 06/26/2020     06/26/2020    CO2 27 06/26/2020       Hepatic Function Panel:   Lab Results   Component Value Date    ALKPHOS 48 06/22/2020    ALT 35 06/22/2020    AST 52 06/22/2020    PROT 4.7 06/22/2020    BILITOT 1.2 06/22/2020    BILIDIR 0.4 06/22/2020    IBILI 0.8 06/22/2020    LABALBU 2.6 06/22/2020       Micro:  6/22 BC no growth to date     6/21 BC x 2 S aureus / MSSA  ciprofloxacin Sensitive <=0.5 mcg/mL   clindamycin Sensitive <=0.25 mcg/mL   erythromycin Sensitive <=0.25 mcg/mL   oxacillin Sensitive 0.5 mcg/mL   tetracycline Sensitive <=1 mcg/mL PVE (prosthetic v endocarditis)    Plan:     Cont ancef  NATALIE - not urgent, can do as outpt with anaesthesia given XRT (discussed with Dr Bala Topete)  Treat for at least 4 weeks, may be longer / add gent / rif if NATALIE positive     Discussed with pt, Dr Imelda Boswell MD    INFUSION ORDERS:  Ancef 2 gm iv q 8 hr through 7/20/20  - First dose given in hospital  - PICC   - Disposition / date discharge  - Check CBC w diff, CMP, ESR, CRP every Mon or Tue - FAX result to [phone]  - Call with antibiotic / infusion issues, [phone]  - Call any change in status, transfer out of facility or to hospital - [phone]  - No f/u in outpatient ID office necessary

## 2020-06-26 NOTE — CARE COORDINATION
Case Management Assessment           Daily Note                 Date/ Time of Note: 6/26/2020 1:39 PM         Note completed by: Ashutosh Muller    Patient Name: Ceci Alvarado  YOB: 1960    Diagnosis:Severe sepsis ([address] [A41.9, R65.20]  Severe sepsis ([facility] [address] [A41.9, R65.20]  Patient Admission Status: Inpatient    Date of Admission:6/21/2020  3:01 PM Length of Stay: 5 GLOS:        Current Plan of Care: Floor Status Switch to bolus tube feeds. Move to bridge to Warfarin.   ________________________________________________________________________________________  PT AM-PAC: 14 / 24 per last evaluation on: 6/25     OT AM-PAC: 17 / 24 per last evaluation on: 6/25    DME Needs for discharge: defer  ________________________________________________________________________________________  Discharge Plan: Other: Return to Assisted Living at [facility] for home care needs referral placed. Will need to reach out to [facility] to get pump device moved to [facility]. Tentative discharge date: 6/27    Current barriers to discharge: Medical Clearance, AL willing to re-accept. Referrals completed: Other: Linette Anderson    Resources/ information provided: Not indicated at this time  ________________________________________________________________________________________  Case Management Notes: KEDAR rounded on this date. Plan for discharge home tomorrow. KEDAR placed call to Isabel at [facility] ([phone]) She is looking into and working to get patient's pump back to [facility]. KEDAR placed call to Linette Anderson ([phone]) Awaiting return call from [facility] to discuss return tomorrow. Will need referral to [facility] for home care. Patient has been paying for tube feeds on own. Working with dietary on tube feed needs. KEDAR provided warm handoff via call to KEDAR Dotson on [address].      UPDATE: 4:11 pm. Carina is confirming pump assist device is with patient's sister. P called SW back and appear to be able to accept patient tomorrow. SW noted they can not due an on-site due to current COVID status. SW re-iterated he spoke with Leyda Callahan liaison, a few days ago and that patient is documented as being ready and able to handle requested tube feed tasks. Flaca Herman and his family were provided with choice of provider; he and his family are in agreement with the discharge plan.     Care Transition Patient: STACEY Espinoza  The [address] [place] [address] ICU  Case Management Department  Ph: [phone]

## 2020-06-26 NOTE — FLOWSHEET NOTE
06/25/20 1952   Encounter Summary   Services provided to: Patient   Referral/Consult From: Alicia   Continue Visiting   (6/25/2020, SARITA.)   Complexity of Encounter Moderate   Length of Encounter 15 minutes   Routine   Type Initial   Assessment Approachable   Intervention Nurtured hope   Outcome Expressed gratitude

## 2020-06-26 NOTE — PLAN OF CARE
Patient's AntiXa returned under target. Per orders, administered a Heparin 40u/kg bolus and increased the Heparin gtt by 2u/kg/hr. Witnessed by 2nd RN as noted in [address] [place] [address] Rd. Will retest AntiXa again @ 7:00 a.m. Results for Ronen Wang (MRN [id number]) as of 6/26/2020 04:08   Ref.  Range 6/25/2020 23:07   Anti-XA Unfrac Heparin Latest Ref Range: 0.30 - 0.70 IU/mL 0.24 (L)

## 2020-06-26 NOTE — PROGRESS NOTES
Clinical Pharmacy Consult Note    Admit date: 6/21/2020    Interval Update: Cefepime switched to cefazolin for MSSA bacteremia. 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 He presented from [facility] with fever (Tmax 102.9F in ED), tachycardia, and hypotension. He was admitted for the treatment of sepsis with IV antibiotics. Warfarin was held for low Hgb and concern for bleeding. FOBT+ and GI was consulted but was not concerned for active bleeding. Patient is now being restarted on warfarin. Pharmacy is consulted to dose warfarin per Dr. Marina Aelxander anticoagulation regimen:  Warfarin 11 mg daily per medication list from [facility]  · Per chart review, patient's warfarin is managed by [facility]. His most recent INR check was 1.4 on 6/16, but unable to see instructions. He has been on and off enoxaparin (Lovenox). · Per note on 6/10, there has been difficulty keeping patient's INR therapeutic. RN noted that patient had PEG tube placed in the hospital and that tube feeds may have had vitamin K in it which could have caused INR to decrease. Date INR Warfarin Dose   6/25 1.26 11mg   6/26 1.29                  Recent Labs     06/25/20  0710 06/26/20  0512    140   K 3.6 3.5    106   CO2 27 27   BUN 12 12   CREATININE <0.5* <0.5*   GLUCOSE 103* 96       CrCl cannot be calculated (This lab value cannot be used to calculate CrCl because it is not a number: <0.5).     Lab Results   Component Value Date    WBC 3.3 (L) 06/26/2020    HGB 8.2 (L) 06/26/2020    HCT 23.2 (L) 06/26/2020    MCV 87.9 06/26/2020    PLT 77 (L) 06/26/2020       Lab Results   Component Value Date    PROTIME 15.0 (H) 06/26/2020    INR 1.29 (H) 06/26/2020       Height:  5' 10\" (177.8 cm)  Weight:  145 lb 1 oz (65.8 kg)        Assessment/Plan:  1. Anticoagulation:  Mechanical Aortic Valve replacement (Goal INR: 2.5-3.5)   · Patient presented with a theraeputic INR of 3.44. · Per chart review, patient's INR has been labile recently and difficult to keep therapeutic. There was speculation that tube feeds recently started may have had vitamin K which caused INR to decrease. · His most recent INR check prior to admission per chart review was 1.26 on 6/16   · INR today = 1.29 (subtherapeutic)  · Patient is currently on a heparin gtt for bridging. Recommend to continue this until INR is 2.5 and stable. · Will continue warfarin 11 mg daily for now. Expect INR to take a few days to increase. · Medication profile reviewed for potential drug interactions. No significant drug interactions with warfarin noted. · Daily INR will be monitored and dose adjustments made as needed. Please call with any questions. Thanks for consulting pharmacy!   Sherice Bowling PharmD  PGY1 Pharmacy Resident   Wireless: [phone]  6/26/2020 8:51 AM

## 2020-06-26 NOTE — PROGRESS NOTES
On shift assessment patients PICC line was noted to have puss like drainage from insertion site leaking out from the dressing. Heparin infusion switched to PIV and PICC team called to look at site. Dr. Dali mendez served and ordered it to be re-placed. Will monitor.

## 2020-06-26 NOTE — CARE COORDINATION
Referred to patient for home IV antibxs. Referral made to [employer]. Patient with $39.00 week copay and $12.00 daily for supplies. Patient notified and agreeable. Patient feels he will be able to learn to do antibiotics. [facility], [phone], is able to do daily IV antibx. MD notified and to change to daily ceftrixone. [facility] notified. [facility] updated. Spoke to [facility]. They are unable to assist with IVs.  No other needs at this time.   Electronically signed by STACEY Gorman, MELLISA on 6/26/2020 at 6:22 PM

## 2020-06-26 NOTE — PROGRESS NOTES
Pt. Has yellow drainage from picc line insertion site. Removed old dressing, biopatch and wingguard. Prepped skin with chloaraprep and Etoh x 3minutes. Applied biopatch. Site is Clean. Secured line with tegaderm and stat lock. Continue to monitor. Pt. Tolerated well.

## 2020-06-26 NOTE — PROGRESS NOTES
Palliative Care Chart Review  and Check in Note:     NAME:  Janine Malcolm  Admit Date: 6/21/2020  Hospital Day:  Hospital Day: 6   Current Code status: Full Code    Palliative care is continuing to following Mr. Warren Bashir for symptom management,  and goals of care discussion as needed. Patient's chart reviewed today 6/26/20. Pt is sleeping. Did not disturb him. The following are the currently established goals/code status, and Symptom management. Goals of care: Pt wants to return to [facility] and follow up with his oncologist and rad onc to continue treatment.     Code status: Full code    Discharge plan: D/c to [place]  [address]

## 2020-06-27 PROBLEM — R65.20 SEVERE SEPSIS (HCC): Status: RESOLVED | Noted: 2020-06-21 | Resolved: 2020-06-27

## 2020-06-27 PROBLEM — A41.9 SEVERE SEPSIS (HCC): Status: RESOLVED | Noted: 2020-06-21 | Resolved: 2020-06-27

## 2020-06-27 LAB
ANION GAP SERPL CALCULATED.3IONS-SCNC: 5 MMOL/L (ref 3–16)
ANISOCYTOSIS: ABNORMAL
ANTI-XA UNFRAC HEPARIN: 0.37 IU/ML (ref 0.3–0.7)
BANDED NEUTROPHILS RELATIVE PERCENT: 2 % (ref 0–7)
BASOPHILS ABSOLUTE: 0 K/UL (ref 0–0.2)
BASOPHILS RELATIVE PERCENT: 0 %
BUN BLDV-MCNC: 10 MG/DL (ref 7–20)
CALCIUM SERPL-MCNC: 7.8 MG/DL (ref 8.3–10.6)
CHLORIDE BLD-SCNC: 108 MMOL/L (ref 99–110)
CO2: 28 MMOL/L (ref 21–32)
CREAT SERPL-MCNC: 0.5 MG/DL (ref 0.9–1.3)
EOSINOPHILS ABSOLUTE: 0.1 K/UL (ref 0–0.6)
EOSINOPHILS RELATIVE PERCENT: 2 %
GFR AFRICAN AMERICAN: >60
GFR NON-AFRICAN AMERICAN: >60
GLUCOSE BLD-MCNC: 105 MG/DL (ref 70–99)
HCT VFR BLD CALC: 25 % (ref 40.5–52.5)
HEMOGLOBIN: 8.5 G/DL (ref 13.5–17.5)
INR BLD: 1.62 (ref 0.86–1.14)
LYMPHOCYTES ABSOLUTE: 0.7 K/UL (ref 1–5.1)
LYMPHOCYTES RELATIVE PERCENT: 23 %
MCH RBC QN AUTO: 30.4 PG (ref 26–34)
MCHC RBC AUTO-ENTMCNC: 34.1 G/DL (ref 31–36)
MCV RBC AUTO: 89.1 FL (ref 80–100)
METAMYELOCYTES RELATIVE PERCENT: 1 %
MONOCYTES ABSOLUTE: 0.1 K/UL (ref 0–1.3)
MONOCYTES RELATIVE PERCENT: 2 %
NEUTROPHILS ABSOLUTE: 2.1 K/UL (ref 1.7–7.7)
NEUTROPHILS RELATIVE PERCENT: 70 %
PDW BLD-RTO: 18.6 % (ref 12.4–15.4)
PLATELET # BLD: 93 K/UL (ref 135–450)
PMV BLD AUTO: 8.8 FL (ref 5–10.5)
POTASSIUM REFLEX MAGNESIUM: 3.9 MMOL/L (ref 3.5–5.1)
PROTHROMBIN TIME: 18.9 SEC (ref 10–13.2)
RBC # BLD: 2.8 M/UL (ref 4.2–5.9)
SODIUM BLD-SCNC: 141 MMOL/L (ref 136–145)
WBC # BLD: 2.9 K/UL (ref 4–11)

## 2020-06-27 PROCEDURE — 85025 COMPLETE CBC W/AUTO DIFF WBC: CPT

## 2020-06-27 PROCEDURE — 2580000003 HC RX 258: Performed by: INTERNAL MEDICINE

## 2020-06-27 PROCEDURE — 36415 COLL VENOUS BLD VENIPUNCTURE: CPT

## 2020-06-27 PROCEDURE — 6360000002 HC RX W HCPCS: Performed by: INTERNAL MEDICINE

## 2020-06-27 PROCEDURE — 6370000000 HC RX 637 (ALT 250 FOR IP): Performed by: INTERNAL MEDICINE

## 2020-06-27 PROCEDURE — 6370000000 HC RX 637 (ALT 250 FOR IP): Performed by: STUDENT IN AN ORGANIZED HEALTH CARE EDUCATION/TRAINING PROGRAM

## 2020-06-27 PROCEDURE — 85610 PROTHROMBIN TIME: CPT

## 2020-06-27 PROCEDURE — 2700000000 HC OXYGEN THERAPY PER DAY

## 2020-06-27 PROCEDURE — 1200000000 HC SEMI PRIVATE

## 2020-06-27 PROCEDURE — 85520 HEPARIN ASSAY: CPT

## 2020-06-27 PROCEDURE — 94761 N-INVAS EAR/PLS OXIMETRY MLT: CPT

## 2020-06-27 PROCEDURE — 80048 BASIC METABOLIC PNL TOTAL CA: CPT

## 2020-06-27 PROCEDURE — 6360000002 HC RX W HCPCS: Performed by: STUDENT IN AN ORGANIZED HEALTH CARE EDUCATION/TRAINING PROGRAM

## 2020-06-27 RX ADMIN — OXYBUTYNIN CHLORIDE 5 MG: 5 TABLET ORAL at 22:27

## 2020-06-27 RX ADMIN — ONDANSETRON 4 MG: 2 INJECTION INTRAMUSCULAR; INTRAVENOUS at 15:59

## 2020-06-27 RX ADMIN — ACETAMINOPHEN 650 MG: 325 TABLET ORAL at 22:27

## 2020-06-27 RX ADMIN — ATORVASTATIN CALCIUM 40 MG: 40 TABLET, FILM COATED ORAL at 09:33

## 2020-06-27 RX ADMIN — ASPIRIN 81 MG: 81 TABLET, COATED ORAL at 09:34

## 2020-06-27 RX ADMIN — TAMSULOSIN HYDROCHLORIDE 0.4 MG: 0.4 CAPSULE ORAL at 09:33

## 2020-06-27 RX ADMIN — HEPARIN SODIUM 26 UNITS/KG/HR: 10000 INJECTION, SOLUTION INTRAVENOUS at 15:59

## 2020-06-27 RX ADMIN — SERTRALINE HYDROCHLORIDE 100 MG: 100 TABLET ORAL at 09:33

## 2020-06-27 RX ADMIN — CEFAZOLIN SODIUM 2 G: 1 POWDER, FOR SOLUTION INTRAMUSCULAR; INTRAVENOUS at 01:16

## 2020-06-27 RX ADMIN — ACETAMINOPHEN 650 MG: 325 TABLET ORAL at 00:32

## 2020-06-27 RX ADMIN — DOCUSATE SODIUM 50 MG AND SENNOSIDES 8.6 MG 1 TABLET: 8.6; 5 TABLET, FILM COATED ORAL at 22:27

## 2020-06-27 RX ADMIN — ONDANSETRON 4 MG: 2 INJECTION INTRAMUSCULAR; INTRAVENOUS at 09:54

## 2020-06-27 RX ADMIN — FAMOTIDINE 20 MG: 20 TABLET ORAL at 09:33

## 2020-06-27 RX ADMIN — CEFAZOLIN SODIUM 2 G: 1 POWDER, FOR SOLUTION INTRAMUSCULAR; INTRAVENOUS at 11:15

## 2020-06-27 RX ADMIN — CEFAZOLIN SODIUM 2 G: 1 POWDER, FOR SOLUTION INTRAMUSCULAR; INTRAVENOUS at 19:21

## 2020-06-27 RX ADMIN — Medication 10 ML: at 22:27

## 2020-06-27 RX ADMIN — WARFARIN SODIUM 11 MG: 5 TABLET ORAL at 09:33

## 2020-06-27 RX ADMIN — FAMOTIDINE 20 MG: 20 TABLET ORAL at 22:27

## 2020-06-27 RX ADMIN — OXYBUTYNIN CHLORIDE 5 MG: 5 TABLET ORAL at 09:33

## 2020-06-27 RX ADMIN — ONDANSETRON 4 MG: 2 INJECTION INTRAMUSCULAR; INTRAVENOUS at 00:28

## 2020-06-27 RX ADMIN — ONDANSETRON 4 MG: 2 INJECTION INTRAMUSCULAR; INTRAVENOUS at 22:29

## 2020-06-27 ASSESSMENT — PAIN SCALES - GENERAL
PAINLEVEL_OUTOF10: 0
PAINLEVEL_OUTOF10: 4
PAINLEVEL_OUTOF10: 0
PAINLEVEL_OUTOF10: 4

## 2020-06-27 ASSESSMENT — PAIN DESCRIPTION - ONSET
ONSET: ON-GOING
ONSET: ON-GOING

## 2020-06-27 ASSESSMENT — PAIN DESCRIPTION - PAIN TYPE
TYPE: CHRONIC PAIN
TYPE: CHRONIC PAIN

## 2020-06-27 ASSESSMENT — PAIN DESCRIPTION - LOCATION
LOCATION: THROAT
LOCATION: THROAT;NECK

## 2020-06-27 ASSESSMENT — PAIN DESCRIPTION - DESCRIPTORS
DESCRIPTORS: CONSTANT
DESCRIPTORS: CONSTANT

## 2020-06-27 ASSESSMENT — PAIN - FUNCTIONAL ASSESSMENT
PAIN_FUNCTIONAL_ASSESSMENT: PREVENTS OR INTERFERES SOME ACTIVE ACTIVITIES AND ADLS
PAIN_FUNCTIONAL_ASSESSMENT: PREVENTS OR INTERFERES SOME ACTIVE ACTIVITIES AND ADLS

## 2020-06-27 ASSESSMENT — PAIN DESCRIPTION - FREQUENCY
FREQUENCY: CONTINUOUS
FREQUENCY: INTERMITTENT

## 2020-06-27 NOTE — DISCHARGE INSTR - DIET

## 2020-06-27 NOTE — PROGRESS NOTES
noted  Respiratory:  Normal respiratory effort. Diffuse wheezes  Cardiovascular: Regular rate and rhythm, nl S1/S2, w/o murmurs, rubs or gallops, no lower extremity edema  Abdomen: Soft, non-tender, non-distended, no hepatosplenomegaly  Musculoskeletal: No cyanosis, clubbing or petechiae, no lower extremity misalignment, asymmetry, or crepitation  Skin: Normal skin color, texture, turgor. No rashes or lesions noted. PEG in place  Psychiatric: Alert and oriented x4, good insight and judgment    Labs:   Recent Labs     06/25/20  0710 06/26/20  0512 06/27/20  0538   WBC 3.6* 3.3* 2.9*   HGB 8.2* 8.2* 8.5*   HCT 24.1* 23.2* 25.0*   PLT 77* 77* 93*     Recent Labs     06/25/20  0710 06/26/20  0512 06/27/20  0538    140 141   K 3.6 3.5 3.9    106 108   CO2 27 27 28   BUN 12 12 10   CREATININE <0.5* <0.5* 0.5*   CALCIUM 7.6* 7.6* 7.8*     No results for input(s): AST, ALT, BILIDIR, BILITOT, ALKPHOS in the last 72 hours. Recent Labs     06/25/20  0710 06/26/20  0512 06/27/20  0538   INR 1.26* 1.29* 1.62*     No results for input(s): CKTOTAL, TROPONINI in the last 72 hours. Urinalysis:      Lab Results   Component Value Date    NITRU Negative 06/21/2020    WBCUA 3-5 06/21/2020    BACTERIA 4+ 06/21/2020    RBCUA 3-4 06/21/2020    BLOODU MODERATE 06/21/2020    SPECGRAV 1.020 06/21/2020    GLUCOSEU Negative 06/21/2020       Radiology:  CT CHEST ABDOMEN PELVIS WO CONTRAST   Final Result      CHEST:      1.  Small right pleural effusion and multifocal pneumonia in the bilateral lungs with a light lower lobe predominance. 2.  Moderate cardiomegaly. ABDOMEN/PELVIS:      1. Rectal wall thickening representing proctitis. Rectum distended with stool. 2.  Colonic diverticulosis without evidence of diverticulitis. 3.  Cholelithiasis and mild gallbladder distention. 4.  Mild splenomegaly. 5.  Right nephrolithiasis.          XR CHEST PORTABLE   Final Result      Bilateral airspace disease concerning for pneumonia; Viral pneumonia should be considered. Assessment/Plan:    Active Hospital Problems    Diagnosis Date Noted    Staphylococcus aureus bacteremia [R78.81] 06/25/2020    H/O mechanical aortic valve replacement [Z95.2] 06/25/2020    Tongue cancer ([facility] [address] [C02.9] 06/25/2020    Septic shock (HCC) [A41.9, R65.21]     Severe sepsis ([facility] [address] [A41.9, R65.20] 06/21/2020       Plan:       # Severe sepsis due to multifocal PNA, likely gram positive  -COVID negative  -Repeat cultures growing staph aureus  -Off pressors  -Continues on vancomycin, cefepime  -ID consulted  -Replace PICC today  -NATALIE to r/o endocarditis     # AOC diastolic HF  -Elevated proBNP  -s/p sepsis fluid bolus  -Echo complete     # AOC anemia, thrombocytopenia  -s/p transfusion of 1u pRBC  -appreciate GI input, no evidence of blood loss, likely hemolytic/sonsumtive process related to acute infection  -hgb stable, 8.2     # NSTEMI  -Likely type 2  -s/p fluids, pressors     # Constipation  -scheduled colace and senna     # h/o stage 4 SCC tongue base  -Cisplatin  -s/p radiation  -Palliative care consulted     # h/o Mechanical AV in 2000  -heparin gtt as bridge to coumadin  -coumadin started     # CAD s/p Stent 1997  # CVA w/ residual L hemiparesis, dysarthria, visual deficit   -Continue ASA     # GERD  -IV protonix     # BPH  -Continue flomax     # Overactive bladder  oxybutynin     # HL  -Continue Lipitor    DVT Prophylaxis: heparin gtt, bridge to warfarin  Diet: DIET TUBE FEEDING BOLUS NPO; 1.5 Calorie with Fiber (Jevity 1.5);  Gastrostomy; 1,185; 24  Code Status: Full Code    PT/OT Eval Status: complete    Dispo - inpt, eventual assisted living w/ Isaiah Estrada MD

## 2020-06-27 NOTE — PLAN OF CARE
Problem: Falls - Risk of:  Goal: Will remain free from falls  Description: Will remain free from falls  6/27/2020 1139 by Eileen Chao RN  Outcome: Ongoing  Note: Patient is a risk for falls. Bed locked in the lowest position. Bed alarm in use. Call light and personal belongings within reach. Instructed to call for help before getting up, patient verbalizes understanding. Door to room left open. Wearing non-skid socks. Fall sign posted. Will continue to monitor. Problem: Pain:  Description: Pain management should include both nonpharmacologic and pharmacologic interventions. Goal: Control of acute pain  Description: Control of acute pain  Outcome: Ongoing  Note: Patient resting comfortably in bed at this time, denying pain. Will continue to monitor.

## 2020-06-27 NOTE — PLAN OF CARE
Problem: Falls - Risk of:  Goal: Will remain free from falls  Description: Will remain free from falls  Outcome: Ongoing   No attempts to get out of bed without assistance. Pt reminded to call for assist before ambulating. Nonskid socks on. Bed locked and in lowest position. Side rails up x3. Exit alarm in use. Call light in reach. Remains free from injury. Will cont to monitor safety. Problem: Nutrition  Goal: Optimal nutrition therapy  Outcome: Ongoing   GT placement verified and bolus TF given this shift per orders. Asp 8 mL. Pt tolerated well and no distress noted. HOB elevated to 30 degrees. Call light within reach. Will cont to monitor.

## 2020-06-27 NOTE — DISCHARGE SUMMARY
Hospital Medicine Discharge Summary    Patient ID: Rhonda Amna      Patient's PCP: No primary care provider on file. Admit Date: 6/21/2020     Discharge Date: 6/27/2020    Admitting Physician: Sumit Wall MD     Discharge Physician: Rico Esteban MD     Discharge Diagnoses: Active Hospital Problems    Diagnosis Date Noted    Staphylococcus aureus bacteremia [R78.81] 06/25/2020    H/O mechanical aortic valve replacement [Z95.2] 06/25/2020    Tongue cancer ([facility] [address] [C02.9] 06/25/2020    Septic shock (HCC) [A41.9, R65.21]     Severe sepsis ([address] [A41.9, R65.20] 06/21/2020     See plan in progress note from this date for full hospital problem list.    The patient was seen and examined on day of discharge and this discharge summary is in conjunction with any daily progress note from day of discharge. Hospital Course:    Please see admission H&P as well as progress note from this date. Physical Exam Performed:     BP (!) 97/50   Pulse 69   Temp 98.5 °F (36.9 °C) (Oral)   Resp 18   Ht 5' 10\" (1.778 m)   Wt 143 lb 1.3 oz (64.9 kg)   SpO2 94%   BMI 20.53 kg/m²     Please see progress note from this date    Labs: For convenience and continuity at follow-up the following most recent labs are provided:      CBC:    Lab Results   Component Value Date    WBC 2.9 06/27/2020    HGB 8.5 06/27/2020    HCT 25.0 06/27/2020    PLT 93 06/27/2020       Renal:    Lab Results   Component Value Date     06/27/2020    K 3.9 06/27/2020     06/27/2020    CO2 28 06/27/2020    BUN 10 06/27/2020    CREATININE 0.5 06/27/2020    CALCIUM 7.8 06/27/2020         Significant Diagnostic Studies    Radiology:   CT CHEST ABDOMEN PELVIS WO CONTRAST   Final Result      CHEST:      1.  Small right pleural effusion and multifocal pneumonia in the bilateral lungs with a light lower lobe predominance. 2.  Moderate cardiomegaly. ABDOMEN/PELVIS:      1. Rectal wall thickening representing proctitis.  Rectum distended with stool. 2.  Colonic diverticulosis without evidence of diverticulitis. 3.  Cholelithiasis and mild gallbladder distention. 4.  Mild splenomegaly. 5.  Right nephrolithiasis. XR CHEST PORTABLE   Final Result      Bilateral airspace disease concerning for pneumonia; Viral pneumonia should be considered. Consults:     IP CONSULT TO CRITICAL CARE  IP CONSULT TO HOSPITALIST  IP CONSULT TO PHARMACY  IP CONSULT TO DIETITIAN  IP CONSULT TO CRITICAL CARE  IP CONSULT TO CRITICAL CARE  IP CONSULT TO PALLIATIVE CARE  PHARMACY TO DOSE WARFARIN  IP CONSULT TO GI  IP CONSULT TO INFECTIOUS DISEASES  PHARMACY TO DOSE WARFARIN  IP CONSULT TO CARDIOLOGY    Disposition:  Long term care    Condition at Discharge: Stable    Discharge Instructions/Follow-up:  Follow up with PCP in 1 week for hospital follow-up and to review any pending labs/tests.    -Follow up with PCP in 1 week for hospital follow up and to order transesophageal echo  -continue AB as ordered  -Continue lovenox BID until warfarin is therapeutic.     INFUSION ORDERS:  Ancef 2 gm iv q 8 hr through 7/20/20  - First dose given in hospital  - PICC   - Disposition / date discharge  - Check CBC w diff, CMP, ESR, CRP every Mon or Tue - FAX result to [phone]  - Call with antibiotic / infusion issues, [phone]  - Call any change in status, transfer out of facility or to hospital - [phone]  - No f/u in outpatient ID office necessary      Code Status:  Full Code    Activity: activity as tolerated    Diet: tube feeds    Discharge Medications:     Current Discharge Medication List           Details   enoxaparin (LOVENOX) 100 MG/ML injection Inject 0.6 mLs into the skin 2 times daily Until INR reaches 2.0 and then discontinue order  Qty: 30 mL, Refills: 3              Details   acetaminophen (TYLENOL) 325 MG tablet Take 650 mg by mouth every 6 hours as needed for Pain      aspirin 81 MG chewable tablet Take 81 mg by mouth daily Mouthwashes (BIOTENE) LIQD oral solution Swish and spit 15 mLs 3 times daily as needed      vitamin D 25 MCG (1000 UT) CAPS Take 1 capsule by mouth daily      docusate (COLACE) 50 MG/5ML liquid Take 50 mg by mouth 2 times daily as needed (constipation)      Promethazine HCl 6.25 MG/5ML SOLN Take 25 mg by mouth every 6 hours as needed (nausea)      senna (SENOKOT) 8.6 MG tablet Take 2 tablets by mouth as needed for Constipation      Multiple Vitamins-Minerals (THERAPEUTIC MULTIVITAMIN-MINERALS) tablet Take 1 tablet by mouth daily      carboxymethylcellulose (REFRESH TEARS) 0.5 % SOLN ophthalmic solution Place 2 drops into both eyes every 6 hours as needed (dry eyes)       atenolol (TENORMIN) 25 MG tablet TAKE 1 TABLET EVERY DAY      atorvastatin (LIPITOR) 40 MG tablet Take 40 mg by mouth daily      ondansetron (ZOFRAN) 4 MG/5ML solution 8 mg by Per NG tube route every 8 hours as needed       oxybutynin (DITROPAN-XL) 10 MG extended release tablet Take 10 mg by mouth daily      pantoprazole (PROTONIX) 40 MG tablet Take 40 mg by mouth daily      sertraline (ZOLOFT) 100 MG tablet TAKE 1 TABLET EVERY DAY      tamsulosin (FLOMAX) 0.4 MG capsule TAKE 1 CAPSULE BY MOUTH NIGHTLY AT BEDTIME.      traMADol (ULTRAM) 50 MG tablet Take 50 mg by mouth every 6 hours as needed for Pain.       warfarin (COUMADIN) 1 MG tablet Take 11 mg by mouth daily       melatonin 3 MG TABS tablet Take 3 mg by mouth nightly as needed             Time Spent on discharge is 35 minutes in the examination, evaluation, counseling and review of medications and discharge plan. Signed:    Joan Bonner MD   6/27/2020      Thank you No primary care provider on file. for the opportunity to be involved in this patient's care. If you have any questions or concerns please feel free to contact me at [phone].

## 2020-06-27 NOTE — PROGRESS NOTES
This RN spoke with [place] from [facility] who states that the patient will not be able to return to the facility today because they don't have anyone that is able to come assess him this weekend. Lei Givens in social work notified.

## 2020-06-27 NOTE — PROGRESS NOTES
[facility], the patients personal pharmacy, called to say that the prescription for lovenox will need to be sent again when the patient needs it since he won't be discharged today.

## 2020-06-27 NOTE — PROGRESS NOTES
Clinical Pharmacy Consult Note    Admit date: 6/21/2020    Interval Update: PICC line replaced yesterday. Plan to be discharged today. 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 He presented from [facility] with fever (Tmax 102.9F in ED), tachycardia, and hypotension. He was admitted for the treatment of sepsis with IV antibiotics. Warfarin was held for low Hgb and concern for bleeding. FOBT+ and GI was consulted but was not concerned for active bleeding. Patient is now being restarted on warfarin. Pharmacy is consulted to dose warfarin per Dr. Evelyn Hearn anticoagulation regimen:  Warfarin 11 mg daily per medication list from [facility]  · Per chart review, patient's warfarin is managed by [facility]. His most recent INR check was 1.4 on 6/16, but unable to see instructions. He has been on and off enoxaparin (Lovenox). · Per note on 6/10, there has been difficulty keeping patient's INR therapeutic. RN noted that patient had PEG tube placed in the hospital and that tube feeds may have had vitamin K in it which could have caused INR to decrease. Date INR Warfarin Dose   6/25 1.26 11mg   6/26 1.29 11mg   6/27 1.62             Recent Labs     06/26/20  0512 06/27/20  0538    141   K 3.5 3.9    108   CO2 27 28   BUN 12 10   CREATININE <0.5* 0.5*   GLUCOSE 96 105*       Estimated Creatinine Clearance: 148 mL/min (A) (based on SCr of 0.5 mg/dL (L)).     Lab Results   Component Value Date    WBC 2.9 (L) 06/27/2020    HGB 8.5 (L) 06/27/2020    HCT 25.0 (L) 06/27/2020    MCV 89.1 06/27/2020    PLT 93 (L) 06/27/2020       Lab Results   Component Value Date    PROTIME 18.9 (H) 06/27/2020    INR 1.62 (H) 06/27/2020       Height:  5' 10\" (177.8 cm)  Weight:  143 lb 1.3 oz (64.9 kg)        Assessment/Plan:  1. Anticoagulation:  Mechanical Aortic Valve replacement (Goal INR: 2.5-3.5)   · Patient presented with a theraeputic INR of 3.44. · Per chart review, patient's INR has been labile recently and difficult to keep therapeutic. There was speculation that tube feeds recently started may have had vitamin K which caused INR to decrease. · His most recent INR check prior to admission per chart review was 1.26 on 6/16   · INR today = 1.62 (subtherapeutic) - but trending appropriately. · Patient is currently on a heparin gtt for bridging, but will be discharged on Lovenox for further bridging plan. Recommend to continue this until INR is 2.5 and stable. · Will continue warfarin 11 mg daily for now. · Medication profile reviewed for potential drug interactions. No significant drug interactions with warfarin noted. · Daily INR will be monitored and dose adjustments made as needed. Discharge recommendation: Please, continue warfarin 11mg daily with Lovenox 60mg SQ BID enough for 5-7 days until therapeutic INR due to high risk of clotting based on the patient's weight and prior hx of CVA, CAD, and MVR. Repeat INR no later than 6/30 on Tuesday. Please call with any questions.   Tuyet Mendoza, PharmD  PGY1 Pharmacy Resident  Wireless: [phone] ([phone])  6/27/2020 10:59 AM

## 2020-06-27 NOTE — CARE COORDINATION
Discharge order noted. SW confirmed with bedside Leif that patient will get antibiotic dose inpatient prior to discharge, will need started with [facility] tomorrow 6/28. Patient family to provide transport home to [facility] per RN discussion with patient. SW spoke with Abeljenny Davenport (report [phone] fax [phone]) who will be ready for report from RN. SW spoke with [facility] joseph-Laura who will make sure patient is start of care tomorrow, 6/28. SW to fax orders/clincals ([phone]). SW awaiting call back from [place] to see when supplies will be delivered. ADDENDUM 11:25 AM  SW received call back from [facility] who confirmed they will deliver supplies, orders faxed as requested. SW also confirmed while talking to [facility] that pole for patient tube feed was delivered to patients residence yesterday. SW intervention complete. ADDENDUM 11:36 AM  SW updated from bedside RN that [facility] staff called and stated they patient cannot return until assessed by their facility to return. ADDENDUM 11:47 AM  SW received call back from Krysta Saucedo who states [facility] will NOT accept patient back until he is assessed by staff on 6/29. Anjelica Clarke states a   name Anjelica Clarke will call [facility] [phone] first thing Monday morning. [facility] cancelled [facility] and [facility] and updated Leif and Dr. Bucky Gordillo via [employer].      Jolanta Mendez, MSW/LSW    [phone]

## 2020-06-28 LAB
ANION GAP SERPL CALCULATED.3IONS-SCNC: 8 MMOL/L (ref 3–16)
ANISOCYTOSIS: ABNORMAL
ANTI-XA UNFRAC HEPARIN: 0.44 IU/ML (ref 0.3–0.7)
BASOPHILS ABSOLUTE: 0 K/UL (ref 0–0.2)
BASOPHILS RELATIVE PERCENT: 0 %
BUN BLDV-MCNC: 12 MG/DL (ref 7–20)
CALCIUM SERPL-MCNC: 8.5 MG/DL (ref 8.3–10.6)
CHLORIDE BLD-SCNC: 107 MMOL/L (ref 99–110)
CO2: 28 MMOL/L (ref 21–32)
CREAT SERPL-MCNC: <0.5 MG/DL (ref 0.9–1.3)
EOSINOPHILS ABSOLUTE: 0 K/UL (ref 0–0.6)
EOSINOPHILS RELATIVE PERCENT: 0 %
GFR AFRICAN AMERICAN: >60
GFR NON-AFRICAN AMERICAN: >60
GLUCOSE BLD-MCNC: 91 MG/DL (ref 70–99)
HCT VFR BLD CALC: 27.2 % (ref 40.5–52.5)
HEMOGLOBIN: 9.1 G/DL (ref 13.5–17.5)
HYPOCHROMIA: ABNORMAL
INR BLD: 1.71 (ref 0.86–1.14)
LYMPHOCYTES ABSOLUTE: 0.6 K/UL (ref 1–5.1)
LYMPHOCYTES RELATIVE PERCENT: 18 %
MCH RBC QN AUTO: 30.7 PG (ref 26–34)
MCHC RBC AUTO-ENTMCNC: 33.6 G/DL (ref 31–36)
MCV RBC AUTO: 91.3 FL (ref 80–100)
METAMYELOCYTES RELATIVE PERCENT: 3 %
MONOCYTES ABSOLUTE: 0.5 K/UL (ref 0–1.3)
MONOCYTES RELATIVE PERCENT: 16 %
MYELOCYTE PERCENT: 3 %
NEUTROPHILS ABSOLUTE: 2.1 K/UL (ref 1.7–7.7)
NEUTROPHILS RELATIVE PERCENT: 60 %
PDW BLD-RTO: 19.2 % (ref 12.4–15.4)
PLATELET # BLD: 93 K/UL (ref 135–450)
PLATELET SLIDE REVIEW: ABNORMAL
PMV BLD AUTO: 8 FL (ref 5–10.5)
POTASSIUM REFLEX MAGNESIUM: 4.1 MMOL/L (ref 3.5–5.1)
PROTHROMBIN TIME: 20 SEC (ref 10–13.2)
RBC # BLD: 2.98 M/UL (ref 4.2–5.9)
SCHISTOCYTES: ABNORMAL
SODIUM BLD-SCNC: 143 MMOL/L (ref 136–145)
WBC # BLD: 3.2 K/UL (ref 4–11)

## 2020-06-28 PROCEDURE — 1200000000 HC SEMI PRIVATE

## 2020-06-28 PROCEDURE — 6360000002 HC RX W HCPCS: Performed by: STUDENT IN AN ORGANIZED HEALTH CARE EDUCATION/TRAINING PROGRAM

## 2020-06-28 PROCEDURE — 6360000002 HC RX W HCPCS: Performed by: INTERNAL MEDICINE

## 2020-06-28 PROCEDURE — 85610 PROTHROMBIN TIME: CPT

## 2020-06-28 PROCEDURE — 6370000000 HC RX 637 (ALT 250 FOR IP): Performed by: STUDENT IN AN ORGANIZED HEALTH CARE EDUCATION/TRAINING PROGRAM

## 2020-06-28 PROCEDURE — 2580000003 HC RX 258: Performed by: INTERNAL MEDICINE

## 2020-06-28 PROCEDURE — 85520 HEPARIN ASSAY: CPT

## 2020-06-28 PROCEDURE — 36415 COLL VENOUS BLD VENIPUNCTURE: CPT

## 2020-06-28 PROCEDURE — 6370000000 HC RX 637 (ALT 250 FOR IP): Performed by: INTERNAL MEDICINE

## 2020-06-28 PROCEDURE — 80048 BASIC METABOLIC PNL TOTAL CA: CPT

## 2020-06-28 PROCEDURE — 85025 COMPLETE CBC W/AUTO DIFF WBC: CPT

## 2020-06-28 RX ADMIN — DOCUSATE SODIUM 50 MG AND SENNOSIDES 8.6 MG 1 TABLET: 8.6; 5 TABLET, FILM COATED ORAL at 20:55

## 2020-06-28 RX ADMIN — ONDANSETRON 4 MG: 2 INJECTION INTRAMUSCULAR; INTRAVENOUS at 20:56

## 2020-06-28 RX ADMIN — ONDANSETRON 4 MG: 2 INJECTION INTRAMUSCULAR; INTRAVENOUS at 15:12

## 2020-06-28 RX ADMIN — OXYBUTYNIN CHLORIDE 5 MG: 5 TABLET ORAL at 09:03

## 2020-06-28 RX ADMIN — FAMOTIDINE 20 MG: 20 TABLET ORAL at 09:03

## 2020-06-28 RX ADMIN — TAMSULOSIN HYDROCHLORIDE 0.4 MG: 0.4 CAPSULE ORAL at 09:03

## 2020-06-28 RX ADMIN — ATORVASTATIN CALCIUM 40 MG: 40 TABLET, FILM COATED ORAL at 09:03

## 2020-06-28 RX ADMIN — ACETAMINOPHEN 650 MG: 325 TABLET ORAL at 09:10

## 2020-06-28 RX ADMIN — CEFAZOLIN SODIUM 2 G: 1 POWDER, FOR SOLUTION INTRAMUSCULAR; INTRAVENOUS at 18:48

## 2020-06-28 RX ADMIN — FAMOTIDINE 20 MG: 20 TABLET ORAL at 20:55

## 2020-06-28 RX ADMIN — CEFAZOLIN SODIUM 2 G: 1 POWDER, FOR SOLUTION INTRAMUSCULAR; INTRAVENOUS at 10:38

## 2020-06-28 RX ADMIN — Medication 10 ML: at 20:56

## 2020-06-28 RX ADMIN — WARFARIN SODIUM 11 MG: 5 TABLET ORAL at 09:03

## 2020-06-28 RX ADMIN — HEPARIN SODIUM 26 UNITS/KG/HR: 10000 INJECTION, SOLUTION INTRAVENOUS at 10:38

## 2020-06-28 RX ADMIN — ASPIRIN 81 MG: 81 TABLET, COATED ORAL at 09:03

## 2020-06-28 RX ADMIN — CEFAZOLIN SODIUM 2 G: 1 POWDER, FOR SOLUTION INTRAMUSCULAR; INTRAVENOUS at 01:26

## 2020-06-28 RX ADMIN — SERTRALINE HYDROCHLORIDE 100 MG: 100 TABLET ORAL at 09:03

## 2020-06-28 RX ADMIN — ONDANSETRON 4 MG: 2 INJECTION INTRAMUSCULAR; INTRAVENOUS at 09:03

## 2020-06-28 RX ADMIN — OXYBUTYNIN CHLORIDE 5 MG: 5 TABLET ORAL at 20:55

## 2020-06-28 RX ADMIN — DOCUSATE SODIUM 50 MG AND SENNOSIDES 8.6 MG 1 TABLET: 8.6; 5 TABLET, FILM COATED ORAL at 09:10

## 2020-06-28 ASSESSMENT — PAIN SCALES - GENERAL
PAINLEVEL_OUTOF10: 3
PAINLEVEL_OUTOF10: 0

## 2020-06-28 NOTE — PLAN OF CARE
Problem: Falls - Risk of:  Goal: Will remain free from falls  Description: Will remain free from falls  6/28/2020 1013 by Clarissa Costello RN  Outcome: Ongoing  Note: Patient is a risk for falls. Bed locked in the lowest position. Bed alarm in use. Call light and personal belongings within reach. Instructed to call for help before getting up, patient verbalizes understanding. Door to room left open. Wearing non-skid socks. Fall sign posted. Will continue to monitor. Problem: Skin Integrity:  Intervention: Turn patient  Note: Skin is warm, dry and intact. Patient able to turn and reposition self. Will continue to encourage the patient to change positions frequently.

## 2020-06-28 NOTE — PROGRESS NOTES
Assessment/Plan:    Active Hospital Problems    Diagnosis Date Noted    Staphylococcus aureus bacteremia [R78.81] 06/25/2020    H/O mechanical aortic valve replacement [Z95.2] 06/25/2020    Tongue cancer ([facility] [address] [C02.9] 06/25/2020       Plan:    # Severe sepsis due to multifocal PNA, likely gram positive  -COVID negative  -Repeat cultures growing staph aureus  -Off pressors  -Continues on vancomycin, cefepime  -ID consulted  -Replace PICC today  -NATALIE to r/o endocarditis     # AOC diastolic HF  -Elevated proBNP  -s/p sepsis fluid bolus  -Echo complete     # AOC anemia, thrombocytopenia  -s/p transfusion of 1u pRBC  -appreciate GI input, no evidence of blood loss, likely hemolytic/sonsumtive process related to acute infection  -hgb stable, 8.2     # NSTEMI  -Likely type 2  -s/p fluids, pressors     # Constipation  -scheduled colace and senna     # h/o stage 4 SCC tongue base  -Cisplatin  -s/p radiation  -Palliative care consulted     # h/o Mechanical AV in 2000  -heparin gtt as bridge to coumadin  -coumadin started     # CAD s/p Stent 1997  # CVA w/ residual L hemiparesis, dysarthria, visual deficit   -Continue ASA     # GERD  -IV protonix     # BPH  -Continue flomax     # Overactive bladder  oxybutynin     # HL  -Continue Lipitor    # Dispo  -Appreciate social work efforts, awaiting facility to accept after on-site visit Monday    DVT Prophylaxis: heparin gtt, bridge to warfarin  Diet: DIET TUBE FEEDING BOLUS NPO; 1.5 Calorie with Fiber (Jevity 1.5); Gastrostomy; 1,185; 24  Code Status: Full Code    PT/OT Eval Status: complete    Dispo - Medically ready for discharge but facility requires on-site assessment Monday.     Geovanni Carbajal MD

## 2020-06-28 NOTE — PLAN OF CARE
Problem: Falls - Risk of:  Goal: Will remain free from falls  Description: Will remain free from falls  Outcome: Ongoing   No attempts to get out of bed without assistance. Pt reminded to call for assist. Nonskid socks on. Bed locked and in lowest position. Side rails up x3. Exit alarm in use. Call light in reach. Remains free from injury. Will cont to monitor safety. Problem: Pain:  Goal: Patient's pain/discomfort is manageable  Description: Patient's pain/discomfort is manageable  Outcome: Ongoing   Pt c/o pain to throat and neck. Gave pt tylenol per GT at 2227 for pain 4/10. Med was effective.

## 2020-06-28 NOTE — PROGRESS NOTES
Clinical Pharmacy Consult Note    Admit date: 6/21/2020    Interval Update: Pending discharge to [facility] after in-person assessment by their staff on Monday. 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 He presented from [facility] with fever (Tmax 102.9F in ED), tachycardia, and hypotension. He was admitted for the treatment of sepsis with IV antibiotics. Warfarin was held for low Hgb and concern for bleeding. FOBT+ and GI was consulted but was not concerned for active bleeding. Patient is now being restarted on warfarin. Pharmacy is consulted to dose warfarin per Dr. Dianelys Lopez anticoagulation regimen:  Warfarin 11 mg daily per medication list from [facility]  · Per chart review, patient's warfarin is managed by [facility]. His most recent INR check was 1.4 on 6/16, but unable to see instructions. He has been on and off enoxaparin (Lovenox). · Per note on 6/10, there has been difficulty keeping patient's INR therapeutic. RN noted that patient had PEG tube placed in the hospital and that tube feeds may have had vitamin K in it which could have caused INR to decrease. Date INR Warfarin Dose   6/25 1.26 11mg   6/26 1.29 11mg   6/27 1.62 11 mg   6/28 1.71 11 mg            Recent Labs     06/27/20  0538 06/28/20  0450    143   K 3.9 4.1    107   CO2 28 28   BUN 10 12   CREATININE 0.5* <0.5*   GLUCOSE 105* 91       CrCl cannot be calculated (This lab value cannot be used to calculate CrCl because it is not a number: <0.5).     Lab Results   Component Value Date    WBC 3.2 (L) 06/28/2020    HGB 9.1 (L) 06/28/2020    HCT 27.2 (L) 06/28/2020    MCV 91.3 06/28/2020    PLT 93 (L) 06/28/2020       Lab Results   Component Value Date PROTIME 20.0 (H) 06/28/2020    INR 1.71 (H) 06/28/2020       Height:  5' 10\" (177.8 cm)  Weight:  143 lb 1.3 oz (64.9 kg)    Assessment/Plan:  1. Anticoagulation:  Mechanical Aortic Valve replacement (Goal INR: 2.5-3.5)   · Patient presented with a theraeputic INR of 3.44. · Per chart review, patient's INR has been labile recently and difficult to keep therapeutic. There was speculation that tube feeds recently started may have had vitamin K which caused INR to decrease. · His most recent INR check prior to admission per chart review was 1.26 on 6/16   · INR today = 1.71 (subtherapeutic) - but trending up appropriately. · Patient is currently on a heparin gtt for bridging, but will be discharged on Lovenox for further bridging plan. Recommend to continue this until INR is 2.5 and stable. · Will continue warfarin 11 mg daily for now. · Medication profile reviewed for potential drug interactions. No significant drug interactions with warfarin noted. · Daily INR will be monitored and dose adjustments made as needed. Discharge recommendation: Continue warfarin 11mg daily. Bridge with Lovenox 60mg SQ BID until INR > 2.5. Repeat INR no later than 6/30 on Tuesday. Please call with any questions.   Sree Grady PharmD., BCPS   6/28/2020 11:52 AM  Wireless: [phone]

## 2020-06-29 LAB
ANION GAP SERPL CALCULATED.3IONS-SCNC: 7 MMOL/L (ref 3–16)
ANISOCYTOSIS: ABNORMAL
ANTI-XA UNFRAC HEPARIN: 0.54 IU/ML (ref 0.3–0.7)
BASOPHILS ABSOLUTE: 0 K/UL (ref 0–0.2)
BASOPHILS RELATIVE PERCENT: 1 %
BUN BLDV-MCNC: 12 MG/DL (ref 7–20)
CALCIUM SERPL-MCNC: 8.6 MG/DL (ref 8.3–10.6)
CHLORIDE BLD-SCNC: 106 MMOL/L (ref 99–110)
CO2: 27 MMOL/L (ref 21–32)
CREAT SERPL-MCNC: 0.5 MG/DL (ref 0.9–1.3)
EOSINOPHILS ABSOLUTE: 0.1 K/UL (ref 0–0.6)
EOSINOPHILS RELATIVE PERCENT: 2 %
GFR AFRICAN AMERICAN: >60
GFR NON-AFRICAN AMERICAN: >60
GLUCOSE BLD-MCNC: 90 MG/DL (ref 70–99)
HCT VFR BLD CALC: 27.4 % (ref 40.5–52.5)
HEMOGLOBIN: 9.3 G/DL (ref 13.5–17.5)
INR BLD: 1.68 (ref 0.86–1.14)
LYMPHOCYTES ABSOLUTE: 0.4 K/UL (ref 1–5.1)
LYMPHOCYTES RELATIVE PERCENT: 9 %
MCH RBC QN AUTO: 30.6 PG (ref 26–34)
MCHC RBC AUTO-ENTMCNC: 34 G/DL (ref 31–36)
MCV RBC AUTO: 90 FL (ref 80–100)
METAMYELOCYTES RELATIVE PERCENT: 1 %
MICROCYTES: ABNORMAL
MONOCYTES ABSOLUTE: 0.2 K/UL (ref 0–1.3)
MONOCYTES RELATIVE PERCENT: 6 %
NEUTROPHILS ABSOLUTE: 3.3 K/UL (ref 1.7–7.7)
NEUTROPHILS RELATIVE PERCENT: 81 %
PDW BLD-RTO: 19.8 % (ref 12.4–15.4)
PLATELET # BLD: 104 K/UL (ref 135–450)
PMV BLD AUTO: 8.3 FL (ref 5–10.5)
POLYCHROMASIA: ABNORMAL
POTASSIUM REFLEX MAGNESIUM: 4.3 MMOL/L (ref 3.5–5.1)
PROTHROMBIN TIME: 19.6 SEC (ref 10–13.2)
RBC # BLD: 3.04 M/UL (ref 4.2–5.9)
SODIUM BLD-SCNC: 140 MMOL/L (ref 136–145)
TEAR DROP CELLS: ABNORMAL
WBC # BLD: 4 K/UL (ref 4–11)

## 2020-06-29 PROCEDURE — 99232 SBSQ HOSP IP/OBS MODERATE 35: CPT | Performed by: INTERNAL MEDICINE

## 2020-06-29 PROCEDURE — 85520 HEPARIN ASSAY: CPT

## 2020-06-29 PROCEDURE — 92526 ORAL FUNCTION THERAPY: CPT

## 2020-06-29 PROCEDURE — 94761 N-INVAS EAR/PLS OXIMETRY MLT: CPT

## 2020-06-29 PROCEDURE — 92610 EVALUATE SWALLOWING FUNCTION: CPT

## 2020-06-29 PROCEDURE — 1200000000 HC SEMI PRIVATE

## 2020-06-29 PROCEDURE — 85610 PROTHROMBIN TIME: CPT

## 2020-06-29 PROCEDURE — 6370000000 HC RX 637 (ALT 250 FOR IP): Performed by: INTERNAL MEDICINE

## 2020-06-29 PROCEDURE — 80048 BASIC METABOLIC PNL TOTAL CA: CPT

## 2020-06-29 PROCEDURE — 2700000000 HC OXYGEN THERAPY PER DAY

## 2020-06-29 PROCEDURE — 6360000002 HC RX W HCPCS: Performed by: STUDENT IN AN ORGANIZED HEALTH CARE EDUCATION/TRAINING PROGRAM

## 2020-06-29 PROCEDURE — 85025 COMPLETE CBC W/AUTO DIFF WBC: CPT

## 2020-06-29 PROCEDURE — 6360000002 HC RX W HCPCS: Performed by: INTERNAL MEDICINE

## 2020-06-29 PROCEDURE — 6370000000 HC RX 637 (ALT 250 FOR IP): Performed by: STUDENT IN AN ORGANIZED HEALTH CARE EDUCATION/TRAINING PROGRAM

## 2020-06-29 PROCEDURE — 36415 COLL VENOUS BLD VENIPUNCTURE: CPT

## 2020-06-29 PROCEDURE — U0002 COVID-19 LAB TEST NON-CDC: HCPCS

## 2020-06-29 PROCEDURE — 97530 THERAPEUTIC ACTIVITIES: CPT

## 2020-06-29 PROCEDURE — 87070 CULTURE OTHR SPECIMN AEROBIC: CPT

## 2020-06-29 PROCEDURE — 2580000003 HC RX 258: Performed by: INTERNAL MEDICINE

## 2020-06-29 PROCEDURE — U0003 INFECTIOUS AGENT DETECTION BY NUCLEIC ACID (DNA OR RNA); SEVERE ACUTE RESPIRATORY SYNDROME CORONAVIRUS 2 (SARS-COV-2) (CORONAVIRUS DISEASE [COVID-19]), AMPLIFIED PROBE TECHNIQUE, MAKING USE OF HIGH THROUGHPUT TECHNOLOGIES AS DESCRIBED BY CMS-2020-01-R: HCPCS

## 2020-06-29 RX ADMIN — HEPARIN SODIUM 26 UNITS/KG/HR: 10000 INJECTION, SOLUTION INTRAVENOUS at 02:43

## 2020-06-29 RX ADMIN — ASPIRIN 81 MG: 81 TABLET, COATED ORAL at 09:44

## 2020-06-29 RX ADMIN — WARFARIN SODIUM 11 MG: 5 TABLET ORAL at 09:43

## 2020-06-29 RX ADMIN — ONDANSETRON 4 MG: 2 INJECTION INTRAMUSCULAR; INTRAVENOUS at 16:48

## 2020-06-29 RX ADMIN — CEFAZOLIN SODIUM 2 G: 1 POWDER, FOR SOLUTION INTRAMUSCULAR; INTRAVENOUS at 10:13

## 2020-06-29 RX ADMIN — ONDANSETRON 4 MG: 2 INJECTION INTRAMUSCULAR; INTRAVENOUS at 10:07

## 2020-06-29 RX ADMIN — ONDANSETRON 4 MG: 2 INJECTION INTRAMUSCULAR; INTRAVENOUS at 22:30

## 2020-06-29 RX ADMIN — SERTRALINE HYDROCHLORIDE 100 MG: 100 TABLET ORAL at 09:44

## 2020-06-29 RX ADMIN — CEFAZOLIN SODIUM 2 G: 1 POWDER, FOR SOLUTION INTRAMUSCULAR; INTRAVENOUS at 19:24

## 2020-06-29 RX ADMIN — DOCUSATE SODIUM 50 MG AND SENNOSIDES 8.6 MG 1 TABLET: 8.6; 5 TABLET, FILM COATED ORAL at 20:14

## 2020-06-29 RX ADMIN — OXYBUTYNIN CHLORIDE 5 MG: 5 TABLET ORAL at 20:14

## 2020-06-29 RX ADMIN — Medication 10 ML: at 09:45

## 2020-06-29 RX ADMIN — HEPARIN SODIUM 26 UNITS/KG/HR: 10000 INJECTION, SOLUTION INTRAVENOUS at 23:17

## 2020-06-29 RX ADMIN — ATORVASTATIN CALCIUM 40 MG: 40 TABLET, FILM COATED ORAL at 09:44

## 2020-06-29 RX ADMIN — DOCUSATE SODIUM 50 MG AND SENNOSIDES 8.6 MG 1 TABLET: 8.6; 5 TABLET, FILM COATED ORAL at 09:44

## 2020-06-29 RX ADMIN — WARFARIN SODIUM 3 MG: 2 TABLET ORAL at 13:44

## 2020-06-29 RX ADMIN — FAMOTIDINE 20 MG: 20 TABLET ORAL at 20:14

## 2020-06-29 RX ADMIN — FAMOTIDINE 20 MG: 20 TABLET ORAL at 09:44

## 2020-06-29 RX ADMIN — OXYBUTYNIN CHLORIDE 5 MG: 5 TABLET ORAL at 09:44

## 2020-06-29 RX ADMIN — CEFAZOLIN SODIUM 2 G: 1 POWDER, FOR SOLUTION INTRAMUSCULAR; INTRAVENOUS at 01:59

## 2020-06-29 NOTE — CONSULTS
NUTRITION ASSESSMENT  Admission Date: 6/21/2020     Type and Reason for Visit: Reassess    ENTERAL NUTRITION RECOMMENDATIONS:   1. Continue BOLUS EN Regimen. Continue Jevity 1.5 - bolus 240 ml x 5 feeds daily for total volume 1200 ml.   2. Adjust feeding times for pt comfort/tolerance. Consider 6a, 12p, 3p, 6p and 9p. OR can provide 2 tetra packs at 1 bolus feeding to reducing feeding times. Total volume to meet 100% of pt needs is 1200 ml EN per day. 3. Provide water flush of 75 ml before and after each feeding bolus. NUTRITION ASSESSMENT:  Discussed EN w/RN who was reporting pt w/nausea at feeding times. Reviewed pt w/IV zofran and is receiving IV q 6hr. Reviewed feeding regimen- adjusting bolus times and med admin could help reduce pt nausea. Volume appropriate for pt nutrition needs and malnutrition. Reviewed water bolus w/RN. RD continues to recommend 1200 ml total volume to meet 100% of pt nutrition needs. RD will adjust bolus schedule and RN will adjust meds vs EN bolus to improve tolerance; reduce nausea. RD will continue to monitor     MALNUTRITION ASSESSMENT  Context: Acute illness or injury   Malnutrition Status: Meets the criteria for moderate malnutrition  Findings of the 6 clinical characteristics of malnutrition (Minimum of 2 out of 6 clinical characteristics is required to make the diagnosis of moderate or severe Protein Calorie Malnutrition based on AND/ASPEN Guidelines):  Energy Intake %: Greater than 75% of estimated energy requirement  Energy Intake Time: (x2d)  Interpretation of Weight Loss %: Unable to assess(no wt Hx per EMR)  Interpretation of Weight Loss Time: in 1 year  Body Fat Status: Moderate subcutaneous fat loss  Body Fat Loss Location: Fat overlying the ribs  Muscle Mass Status:  Moderate muscle mass loss  Muscle Mass Loss Location: Clavicles (pectoralis and deltoids), Thigh (quadriceps), Calf (gastrocnemius)  Fluid Accumulation Status: No significant fluid accumulation  Reduced  Strength: Not measured    NUTRITION DIAGNOSIS   Problem: Inadequate Oral Intake  Etiology: Acute or chronic injury or trauma  Signs & Symptoms: NPO status due to medical condition and Nutrition [address] and/or Nutrient Delivery:Continue NPO or Modify Current Tube Feeding   Nutrition education/counseling/coordination of care: Continue Inpatient Monitoring     NUTRITION MONITORING & EVALUATION:  Evaluation:Progressing towards goal   Goals: Pt will tolerate the most appropriate nutrition support regimen to meet >75% of needs once initiated.    Monitoring: Pertinent Labs , TF Intake  or TF Tolerance      OBJECTIVE DATA:  · Nutrition-Focused Physical Findings:  RN reporting nausea w/feeds; lbm 6/25  · Wounds None      Past Medical History:   Diagnosis Date    Adult failure to thrive     Dysphagia, unspecified     Encounter for attention to gastrostomy ([facility] [address]     GERD without esophagitis     Hyperlipidemia     Hypertension     Legal blindness, as defined in [address] term (current) use of anticoagulants     Malignant neoplasm of oropharynx, unspecified (HCC)     Muscle weakness (generalized)     Overactive bladder     Presence of prosthetic heart valve     Secondary and unspecified malignant neoplasm of lymph nodes of head, face and neck (HCC)     Unspecified severe protein-calorie malnutrition (HCC)     Unsteadiness on feet         ANTHROPOMETRICS  Current Height: 5' 10\" (177.8 cm)  Current Weight: 140 lb 10.5 oz (63.8 kg)    Admission weight: 138 lb (62.6 kg)  Ideal Bodyweight 166 lb (75.4 kg)   Usual Bodyweight BRYAN   Adjusted Bodyweight n/a  Weight Changes BRYAN       BMI BMI (Calculated): 20.2    Wt Readings from Last 50 Encounters:   06/29/20 140 lb 10.5 oz (63.8 kg)       COMPARATIVE STANDARDS  Estimated Total Kcals/Day : 25-30  Current Bodyweight (62 kg) 5878-7363 kcal    Estimated Total Protein (g/day) : 1.3-1.5 Current Bodyweight (62 kg) 80-93 g/day  Estimated

## 2020-06-29 NOTE — PROGRESS NOTES
Clinical Pharmacy Consult Note    Admit date: 6/21/2020    Interval Update: PICC line removed, tip sent for culture. ending discharge to [facility] after in-person assessment by their staff on Monday. 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 [facility] with fever (Tmax 102.9F in ED), tachycardia, and hypotension. He was admitted for the treatment of sepsis with IV antibiotics; currently on Ancef for MSSA bacteremia. Warfarin has been on hold for low Hgb and concern for bleeding. GI consulted, but not concerned for active bleeding. Patient is now being restarted on warfarin. Pharmacy is consulted to dose warfarin per Dr. Juli Randhawa anticoagulation regimen:  Warfarin 11 mg daily per medication list from [facility]  · Per chart review, patient's warfarin is managed by [facility]. His most recent INR check was 1.4 on 6/16, but unable to see instructions. He has been on and off enoxaparin (Lovenox). · Per note on 6/10, there has been difficulty keeping patient's INR therapeutic. RN noted that patient had PEG tube placed in the hospital and that tube feeds may have had vitamin K in it which could have caused INR to decrease. Date INR Warfarin Dose   6/25 1.26 11mg   6/26 1.29 11mg   6/27 1.62 11 mg   6/28 1.71 11 mg   6/29 1.68 11 mg            Recent Labs     06/28/20  0450 06/29/20  0553    140   K 4.1 4.3    106   CO2 28 27   BUN 12 12   CREATININE <0.5* 0.5*   GLUCOSE 91 90       Estimated Creatinine Clearance: 145 mL/min (A) (based on SCr of 0.5 mg/dL (L)).     Lab Results   Component Value Date    WBC 4.0 06/29/2020    HGB 9.3 (L) 06/29/2020    HCT 27.4 (L) 06/29/2020    MCV 90.0 06/29/2020     (L)

## 2020-06-29 NOTE — PROGRESS NOTES
ID Follow-up NOTE    CC:   MSSA bacteremia  Antibiotics: Ancef    Admit Date: 6/21/2020  Hospital Day: 9    Subjective:     Patient feels good, little throat / neck      Objective:     Patient Vitals for the past 8 hrs:   BP Temp Temp src Pulse Resp SpO2 Weight   06/29/20 1248 102/61 98.4 °F (36.9 °C) Oral 69 17 96 % 140 lb 10.5 oz (63.8 kg)   06/29/20 0937 125/66 98 °F (36.7 °C) Oral 72 21 96 % --   06/29/20 0839 -- -- -- -- 20 95 % --     I/O last 3 completed shifts: In: 009 [I.V.:272; NG/GT:562]  Out: 2240 [Urine:2240]  I/O this shift:  In: -   Out: 180 [Urine:180]    EXAM:  GENERAL: No apparent distress.   RA  HEENT: Membranes moist, no oral lesion - L external neck with redness, scab  NECK:  Supple, no lymphadenopathy  LUNGS: Clear b/l, no rales, no dullness  CARDIAC: RRR, no murmur appreciated  ABD:  + BS, soft / NT - G-tube in place  EXT:  No rash, no edema, no lesions  NEURO: No focal neurologic findings  PSYCH: Orientation, sensorium, mood normal  LINES:  Peripheral iv      Data Review:  Lab Results   Component Value Date    WBC 4.0 06/29/2020    HGB 9.3 (L) 06/29/2020    HCT 27.4 (L) 06/29/2020    MCV 90.0 06/29/2020     (L) 06/29/2020     Lab Results   Component Value Date    CREATININE 0.5 (L) 06/29/2020    BUN 12 06/29/2020     06/29/2020    K 4.3 06/29/2020     06/29/2020    CO2 27 06/29/2020       Hepatic Function Panel:   Lab Results   Component Value Date    ALKPHOS 48 06/22/2020    ALT 35 06/22/2020    AST 52 06/22/2020    PROT 4.7 06/22/2020    BILITOT 1.2 06/22/2020    BILIDIR 0.4 06/22/2020    IBILI 0.8 06/22/2020    LABALBU 2.6 06/22/2020       Micro:  6/29 Line tip sent    6/22 BC no growth      6/21 BC x 2 S aureus / MSSA  ciprofloxacin Sensitive <=0.5 mcg/mL   clindamycin Sensitive <=0.25 mcg/mL   erythromycin Sensitive <=0.25 mcg/mL   oxacillin Sensitive 0.5 mcg/mL   tetracycline Sensitive <=1 mcg/mL   trimethoprim-sulfamethoxazole Sensitive <=10 mcg/mL      Imaging: 6/22 Chest / Abd / Pelvic CT:  CHEST:       1.  Small right pleural effusion and multifocal pneumonia in the bilateral lungs with a light lower lobe predominance. 2.  Moderate cardiomegaly.       ABDOMEN/PELVIS:       1.  Rectal wall thickening representing proctitis. Rectum distended with stool. 2.  Colonic diverticulosis without evidence of diverticulitis. 3.  Cholelithiasis and mild gallbladder distention. 4.  Mild splenomegaly. 5.  Right nephrolithiasis.        Scheduled Meds:   [START ON 6/30/2020] warfarin  11 mg Oral Daily    potassium chloride  20 mEq Intravenous Once    lidocaine 1 % injection  5 mL Intradermal Once    sodium chloride flush  10 mL Intravenous 2 times per day    ceFAZolin  2 g Intravenous Q8H    famotidine  20 mg Oral BID    oxybutynin  5 mg Oral BID    sodium chloride  20 mL Intravenous Once    sodium chloride  250 mL Intravenous Once    aspirin  81 mg Oral Daily    atorvastatin  40 mg Oral Daily    sertraline  100 mg Oral Daily    tamsulosin  0.4 mg Oral Daily    polyethylene glycol  17 g Oral Daily    sennosides-docusate sodium  1 tablet Oral BID       Continuous Infusions:   heparin (porcine) 26 Units/kg/hr (06/29/20 0243)       PRN Meds:  perflutren lipid microspheres, sodium chloride flush, potassium chloride, heparin (porcine), heparin (porcine), bisacodyl, dextran 70-hypromellose, magic (miracle) mouthwash, melatonin, traMADol, acetaminophen **OR** acetaminophen, magnesium sulfate, ondansetron      Assessment:     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  Hx CVA (L hemiparesis), CAD, HTN  Hx mechanical AoVR on coumadin  Debilitated, has G-tube, lives in facility [facility])  Had PICC in place on presentation     Presents from [facility] with fever and hypotension  MSSA bacteremia - PICC poss source (never removed), f/u [facility] 6/22 neg, TTE neg; r/o PVE (prosthetic v endocarditis)    Plan:     Cont ancef  NATALIE - not urgent, can do as outpt with anaesthesia given XRT (discussed with Dr Alireza Grover)  Treat for at least 4 weeks, may be longer / add gent / rif if NATALIE positive     Will need new access / NATALIE    Discussed with pt, RN  Jeremiah Reason, MD    INFUSION ORDERS:  Ancef 2 gm iv q 8 hr through 7/20/20  - First dose given in hospital  - PICC   - Disposition / date discharge  - Check CBC w diff, CMP, ESR, CRP every Mon or Tue - FAX result to [phone]  - Call with antibiotic / infusion issues, [phone]  - Call any change in status, transfer out of facility or to hospital - [phone]  - No f/u in outpatient ID office necessary

## 2020-06-29 NOTE — CARE COORDINATION
Referred to patient for d/c planning. Spoke to patient. Call placed to [phone]. They are supposed to come to evaluate patient. They are unable to say who is coming and when they will review patient. Spoke to patient. He prefers to return to his apartment. Discussed possible SNF, list of providers given and reviewed. Patient does not want to return to [address] [place] [address]. Patient is agreeable to referral to [facility], however, doubt [facility] is in network with insurance. Referral made. Await response. No other needs at this time. Electronically signed by STACEY Abbott LISW-S on 6/29/2020 at 1:29 PM     UPDATE:  Received call from family asking for update. Family updated. Call placed to facility. Message left in regards to visit and evaluation. Facility states they are not aware due to it being after 1500. Electronically signed by STACEY Abbott LISW-S on 6/29/2020 at 4:16 PM     UPDATE:  Family updated. Facility told family to call tomorrow to ask for Christiano Sanches who is adminsitrator.    to f/u and call in am.  Electronically signed by STACEY Abbott LISW-S on 6/29/2020 at 4:29 PM

## 2020-06-29 NOTE — PROGRESS NOTES
Hospitalist Progress Note      PCP: No primary care provider on file. Date of Admission: 6/21/2020    Chief Complaint: hypotension, fever    Subjective:  Patient seen and examined at the bedside. No complaints at this time. PFHS: reviewed as documented 6/21/2020, no changes    Medications:  Reviewed    Infusion Medications    heparin (porcine) 26 Units/kg/hr (06/29/20 0243)     Scheduled Medications    potassium chloride  20 mEq Intravenous Once    lidocaine 1 % injection  5 mL Intradermal Once    sodium chloride flush  10 mL Intravenous 2 times per day    warfarin  11 mg Oral Daily    ceFAZolin  2 g Intravenous Q8H    famotidine  20 mg Oral BID    oxybutynin  5 mg Oral BID    sodium chloride  20 mL Intravenous Once    sodium chloride  250 mL Intravenous Once    aspirin  81 mg Oral Daily    atorvastatin  40 mg Oral Daily    sertraline  100 mg Oral Daily    tamsulosin  0.4 mg Oral Daily    polyethylene glycol  17 g Oral Daily    sennosides-docusate sodium  1 tablet Oral BID     PRN Meds: perflutren lipid microspheres, sodium chloride flush, potassium chloride, heparin (porcine), heparin (porcine), bisacodyl, dextran 70-hypromellose, magic (miracle) mouthwash, melatonin, traMADol, acetaminophen **OR** acetaminophen, magnesium sulfate, ondansetron      Intake/Output Summary (Last 24 hours) at 6/29/2020 0929  Last data filed at 6/29/2020 2558  Gross per 24 hour   Intake 1398 ml   Output 2175 ml   Net -777 ml         Physical Exam Performed:    /62   Pulse 63   Temp 98 °F (36.7 °C) (Oral)   Resp 20   Ht 5' 10\" (1.778 m)   Wt 143 lb 1.3 oz (64.9 kg)   SpO2 95%   BMI 20.53 kg/m²     General appearance:  No apparent distress, appears stated age  Eyes: Pupils equal, round, reactive to light, conjunctiva/corneas clear  Ears/Nose/Mouth/Throat: No external lesions or scars, hearing intact to voice  Neck: Trachea midline, no masses noted  Respiratory:  Normal respiratory effort.   Clear breath sounds bilaterally. Cardiovascular: Regular rate and rhythm, nl S1/S2 with murmur and mechanical click, no rubs or gallops, no lower extremity edema  Abdomen: Soft, non-tender, non-distended, no hepatosplenomegaly. PEG in place  Musculoskeletal: No cyanosis, clubbing or petechiae, no lower extremity misalignment, asymmetry, or crepitation  Skin: Normal skin color, texture, turgor. No rashes or lesions noted. Psychiatric: Alert and oriented x4, good insight and judgment    Labs:   Recent Labs     06/27/20  0538 06/28/20 0450 06/29/20  0553   WBC 2.9* 3.2* 4.0   HGB 8.5* 9.1* 9.3*   HCT 25.0* 27.2* 27.4*   PLT 93* 93* 104*     Recent Labs     06/27/20  0538 06/28/20 0450 06/29/20  0553    143 140   K 3.9 4.1 4.3    107 106   CO2 28 28 27   BUN 10 12 12   CREATININE 0.5* <0.5* 0.5*   CALCIUM 7.8* 8.5 8.6     No results for input(s): AST, ALT, BILIDIR, BILITOT, ALKPHOS in the last 72 hours. Recent Labs     06/27/20  0538 06/28/20 0450 06/29/20  0553   INR 1.62* 1.71* 1.68*     No results for input(s): CKTOTAL, TROPONINI in the last 72 hours. Urinalysis:      Lab Results   Component Value Date    NITRU Negative 06/21/2020    WBCUA 3-5 06/21/2020    BACTERIA 4+ 06/21/2020    RBCUA 3-4 06/21/2020    BLOODU MODERATE 06/21/2020    SPECGRAV 1.020 06/21/2020    GLUCOSEU Negative 06/21/2020       Radiology:  CT CHEST ABDOMEN PELVIS WO CONTRAST   Final Result      CHEST:      1.  Small right pleural effusion and multifocal pneumonia in the bilateral lungs with a light lower lobe predominance. 2.  Moderate cardiomegaly. ABDOMEN/PELVIS:      1. Rectal wall thickening representing proctitis. Rectum distended with stool. 2.  Colonic diverticulosis without evidence of diverticulitis. 3.  Cholelithiasis and mild gallbladder distention. 4.  Mild splenomegaly. 5.  Right nephrolithiasis.          XR CHEST PORTABLE   Final Result      Bilateral airspace disease concerning for pneumonia; Viral pneumonia should be considered. Assessment/Plan:    Active Hospital Problems    Diagnosis Date Noted    Staphylococcus aureus bacteremia [R78.81] 06/25/2020    H/O mechanical aortic valve replacement [Z95.2] 06/25/2020    Tongue cancer ([facility] [address] [C02.9] 06/25/2020       Plan:    # Severe sepsis due to multifocal PNA, likely gram positive  -COVID negative  -Repeat cultures growing staph aureus  -Off pressors  -Was on vancomycin, cefepime. On cefazolin now  -PICC line to be taken out and tip to be sent for culture today  -NATALIE to rule out endocarditis, possibly inpt if COVID negative or otherwise pt will get it as an outpatient  -ID following     # AOC diastolic HF  -Elevated proBNP  -s/p sepsis fluid bolus  -Echo complete     # AOC anemia, thrombocytopenia  -s/p transfusion of 1u pRBC  -appreciate GI input, no evidence of blood loss, likely hemolytic/consumtive process related to acute infection  -hgb stable, 9.3     # NSTEMI  -Likely type 2  -s/p fluids, pressors     # Constipation  -scheduled colace and senna     # h/o stage 4 SCC tongue base  -Cisplatin  -s/p radiation  -Palliative care following     # h/o Mechanical AV in 2000  -heparin gtt as bridge to coumadin  -coumadin      # CAD s/p Stent 1997  # CVA w/ residual L hemiparesis, dysarthria, visual deficit   -Continue ASA     # GERD  -IV protonix     # BPH  -Continue flomax     # Overactive bladder  oxybutynin     # HL  -Continue Lipitor    # Dispo  -Appreciate social work efforts, awaiting placement. COVID Test ordered  Discussed with SW    DVT Prophylaxis: heparin gtt, bridge to warfarin  Diet: DIET TUBE FEEDING BOLUS NPO; 1.5 Calorie with Fiber (Jevity 1.5);  Gastrostomy; 1,185; 24  Code Status: Full Code    PT/OT Eval Status: complete    Dispo - In 24-48hrs    Tip Hong MD

## 2020-06-29 NOTE — PROGRESS NOTES
Occupational Therapy  Facility/Department: [address]  Daily Treatment Note  NAME: Parris Vaughn  : 1960  MRN: [id number]    Date of Service: 2020    Discharge Recommendations:  Parris Vaughn scored a 17/24 on the AM-PAC ADL Inpatient form. Current research shows that an AM-PAC score of 18 or greater is typically associated with a discharge to the patient's home setting. Based on the patient's AM-PAC score, and their current ADL deficits, it is recommended that the patient have 2-3 sessions per week of Occupational Therapy at d/c to increase the patient's independence. At this time, this patient demonstrates the endurance and safety to discharge home with home services and a follow up treatment frequency of 2-3x/wk. Please see assessment section for further patient specific details. If patient discharges prior to next session this note will serve as a discharge summary. Please see below for the latest assessment towards goals. HOME HEALTH CARE: LEVEL 1 STANDARD    - Initial home health evaluation to occur within 24-48 hours, in patient home   - Therapy to evaluate with goal of regaining prior level of functioning   - Therapy to evaluate if patient has [address] needs for personal care       OT Equipment Recommendations  Equipment Needed: No    Assessment   Performance deficits / Impairments: Decreased functional mobility ; Decreased ADL status  Assessment: Pt is near his functional baseline- able to complete pivot transfer with sba. would benefit from further trialing ADL tasks, as pt is typically independent at baseline for seated ADLs, uses w/c for mobility.  Pt would benefit from ongoing OT tx upon d/c  Treatment Diagnosis: Impaired functional mobility and ADL  OT Education: OT Role;Transfer Training  Patient Education: Pt verbalized understanding  REQUIRES OT FOLLOW UP: Yes  Activity Tolerance  Activity Tolerance: Patient Tolerated treatment well  Safety Devices  Safety Devices in place: Yes  Type of devices: Nurse notified; Left in chair;Chair alarm in place;Call light within reach         Restrictions  Position Activity Restriction  Other position/activity restrictions: PEG tube, NPO; up with assist     Subjective   General  Chart Reviewed: Yes  Patient assessed for rehabilitation services?: Yes  Additional Pertinent Hx: Pt admitted from [facility] 6/21/2020 with fever and tachycardia. 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 Dali Brink  Diagnosis: Severe Sepsis  Subjective  Subjective: Pt in bed upon entry, agreeable to therapy.  States [place] apartment (has[place] been there since May) but is open to [address] if needed  General Comment  Comments:    Vital Signs  Patient Currently in Pain: Denies     Orientation  Orientation  Overall Orientation Status: Within Functional Limits       Objective       Balance  Sitting Balance: Supervision(seated eob x5 minutes)  Standing Balance: Stand by assistance(brief stance with UE support on scale bar)    Standing Balance  Time: 20 seconds  Activity: static stance for weight    Bed mobility  Rolling to Right: Stand by assistance  Supine to Sit: Stand by assistance  Scooting: Independent     Transfers  Stand Pivot Transfers: Stand by assistance(bed to recliner towards pt's R)  Sit to stand: Stand by assistance(chair with R UE grasp on bar)  Stand to sit: Stand by assistance(chair with 1 UE grasp on bar)                 Plan   Plan  Times per week: 2-5  Current Treatment Recommendations: Balance Training, Functional Mobility Training, Endurance Training, Self-Care / ADL      [facility]  [facility] Inpatient Daily Activity Raw Score: 17 (06/29/20 1344)  AM-PAC Inpatient ADL T-Scale Score : 37.26 (06/29/20 1344)  ADL Inpatient CMS 0-100% Score: 50.11 (06/29/20 1344)  ADL Inpatient CMS G-Code Modifier : CK (06/29/20 1344)    Goals  Short term goals  Time Frame for Short term goals: Discharge  Short term goal 1: Transfer to/from [facility] with supervision- not met  Short term goal 2: Toileting Ria- not met  Short term goal 3: LB dressing supervision- not met  Patient Goals   Patient goals : Discharge to [address] Time   Individual Concurrent Group Co-treatment   Time In 8296         Time Out 1218         Minutes 23         Timed Code Treatment Minutes:   23  Total Treatment Minutes:  [address] [facility] OTR/L, 8003

## 2020-06-29 NOTE — CARE COORDINATION
Spoke to [place], [phone] with [facility], they will not accept patient with IV antibiotics. Spoke to patient and sister at length. Patient interested in [place], referral made, no beds available. Patient does not want to go to [facility]. [place] updated. Patient interested in [place]. Referral made. Await response.   Electronically signed by STACEY Allen LISW-S on 6/29/2020 at 5:54 PM

## 2020-06-29 NOTE — PLAN OF CARE
Problem: Nutrition  Goal: Optimal nutrition therapy  Outcome: Ongoing  Note: Nutrition Problem: Inadequate oral intake  Intervention: Food and/or Nutrient Delivery: Continue NPO, Modify current Tube Feeding  Nutritional Goals: Pt will tolerate the most appropriate nutrition support regimen to meet >75% of needs once initiated.

## 2020-06-29 NOTE — PLAN OF CARE
Completed BSE. Please see report in EMR.      Cristel Cope M.A., [facility]  Speech-Language Pathologist

## 2020-06-29 NOTE — PLAN OF CARE
Problem: Falls - Risk of:  Goal: Will remain free from falls  Description: Will remain free from falls  Outcome: Ongoing  Note: Patient remains free of falls. Call light is in reach and encouraged to use.      Problem: Falls - Risk of:  Goal: Absence of physical injury  Description: Absence of physical injury  Outcome: Ongoing

## 2020-06-29 NOTE — PROGRESS NOTES
Tolerated treatment well     Patient Diagnosis(es): The primary encounter diagnosis was Sepsis, due to unspecified organism, unspecified whether acute organ dysfunction present ([facility] [address] Diagnoses of Pneumonia due to organism and Severe sepsis [facility]) were also pertinent to this visit. 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   has no past surgical history on file. Restrictions  Position Activity Restriction  Other position/activity restrictions: PEG tube, NPO; up with assist  Subjective   General  Chart Reviewed: Yes  Additional Pertinent Hx: Pt is a 64 y.o. male admitted to [facility] on 6/21 with complaints of fever. From [facility] 6/21/2020 with fever and tachycardia in ED. 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 Family / Caregiver Present: No  Subjective  Subjective: Pt found supine in bed and agreeable to PT. Pt reports he typically lives in independent living and plans to return there at discharge.    Pain Screening  Patient Currently in Pain: Denies         Orientation  Orientation  Overall Orientation Status: Within Functional Limits       Objective   Bed mobility  Rolling to Right: Stand by assistance  Supine to Sit: Stand by assistance  Scooting: Independent  Transfers  Bed to Chair: Stand by assistance(EOB to recliner going towards pt's R side)  Ambulation  Ambulation? : (pt non ambulatory at baseline)     Balance  Sitting - Static: Good  Sitting - Dynamic: Good  Comments: Pt sat EOB x5  mins with supervision.                                    AM-PAC Score  AM-PAC Inpatient Mobility Raw Score : 15 (06/29/20 1234)  AM-PAC Inpatient T-Scale Score : 39.45 (06/29/20 1234)  Mobility Inpatient CMS 0-100% Score: 57.7 (06/29/20 1234)  Mobility Inpatient CMS G-Code Modifier : CK (06/29/20 1234)          Goals  Short term goals  Time Frame for Short term goals: Discharge  Short term goal 1: Pt will perform all bed mobility with Erick  ongoing  Short term goal 2: Pt will perform stand pivot transfer with Erick  ongoing  Short term goal 3: Pt will tolerate 10-15 reps seated therex   ongoing  Patient Goals   Patient goals : return to independent living    Plan    Plan  Times per week: 2-5  Times per day: Daily  Current Treatment Recommendations: Strengthening, Functional Mobility Training, Wheelchair Mobility Training, Manual Therapy - Joint Manipulation, Home Exercise Program, Equipment Evaluation, Education, & procurement, ROM, Transfer Training, Safety Education & Training, Balance Training, Endurance Training, Patient/Caregiver Education & Training  Safety Devices  Type of devices: Call light within reach, Chair alarm in place, Gait belt, Left in chair, Nurse notified     Therapy Time   Individual Concurrent Group Co-treatment   Time In 1210         Time Out 1220         Minutes 10           Timed Code Treatment Minutes: 10      Total Treatment Minutes:  [address] [facility], PT

## 2020-06-29 NOTE — PROGRESS NOTES
Assess Dual lumen Picc line. Noted clear drainage on Biopatch under tegaderm. Removed tegaderm and clean insertion site with Chloraprep and Etoh for 2 minutes. Applied new biopatch around insertion site and covered with tegaderm. Slight redness noted around insertion site will continue to monitor. Pt. Tolerated well.

## 2020-06-29 NOTE — PROGRESS NOTES
Speech Language Pathology  Facility/Department: [address]   CLINICAL BEDSIDE SWALLOW EVALUATION    NAME: Jose Mcclelland  : 1960  MRN: [id number]    ADMISSION DATE: 2020  ADMITTING DIAGNOSIS: has Staphylococcus aureus bacteremia; H/O mechanical aortic valve replacement; and Tongue cancer ([address] on their problem list.  ONSET DATE: 2020    Recent Chest Xray/CT of Chest: (2020)  Impression       Bilateral airspace disease concerning for pneumonia; Viral pneumonia should be considered. Date of Eval: 2020  Evaluating Therapist: Jenny Randolph    Current Diet level:  Current Diet : NPO  Current Liquid Diet : NPO    Pain:  Pain Assessment  Pain Assessment: None indicated    Reason for Referral  Jose Mcclelland was referred for a bedside swallow evaluation to assess the efficiency of his swallow function, identify signs and symptoms of aspiration and make recommendations regarding safe dietary consistencies, effective compensatory strategies, and safe eating environment. Impression  Dysphagia Impression :Pt presented to [facility] ED on  from nursing home after he was found to have high fever and tachycardia. Pt was also hypoxic. The patient was admitted for severe sepsis with concerns for pneumonia. The patient typically goes to [facility] for all care but was transported to [facility] as it was closest hospital and due to him being unstable. Per chart review, pt had MBS on 5/15 which was [facility] and a diet of Dysphagia I (Puree) / Thins was recommended (as late as ) for comfort as pt was experiencing mucositis at that time. The pt had PEG placement on  which he reports was due to concerns for malnourishment which is reasonably consistent with his current treatment regiment and diagnosis. Per the patient, he believes he was made NPO sometime after his transfer to SNF and does not recall speech therapy / dysphagia treatment. Unable to access records from SNF.  Pt indicates he still will get 2 more radiation treatments and last had radiation PTA. The patient presents this date with evaluation per MD to [facility] HARESH ELIZABETH ability to swallow for NATALIE procedure\". Oral [facility] exam pertinent for residue left sided weakness (s/p CVA 1999) and generally reduced ROM. Some oral holding vs difficulty initiating swallow vs. behavioral. No oral residue. No complaints of altered taste at this time. Delayed swallow initiation with puree and thins. The patient had no overt s/s of aspiraiton and no changes in vocal quality. The patient was able to complete cued effortful swallows following each bolus with increased time. Recommendation is for ice chips / thin water with aggressive oral care at this time until MBS can be completed. Recommend instrumental swallow assessment prior to resuming PO diet given history. Marc sent to MD.   Dysphagia Outcome Severity Scale: Level 4: Mild moderate dysphagia- Intermittent supervision/cueing. One - two diet consistencies restricted     Treatment Plan  Requires SLP Intervention: Yes  Duration/Frequency of Treatment: 1-3x per week for length of stay      Recommended Diet and Intervention  Diet Solids Recommendation: NPO  Liquid Consistency Recommendation: NPO  Recommended Form of Meds: Via alternative means of nutrition  Recommendations: Modified barium swallow study;Consider ice chips PRN;Dysphagia treatment  Therapeutic Interventions: (pending MBS)    Compensatory Swallowing Strategies  Frequent oral care    Treatment/Goals  Dysphagia Goals: The patient will tolerate instrumental swallowing procedure;  e patient/caregiver will demonstrate understanding of compensatory strategies for improved swallowing safety.   6/29: Education regarding importance of oral care (pt endorses not completing oral care since NPO status), rationale for BSE and definitition of aspiration / aspiration pneumonia, recommendation for instrumental swallow assessment, results/recommendations, general plan of care. Educated on general atrophy from lack of swallowing in addition to dysphagia likely secondary to treatment and presence of neoplasm. Continue goal.     General  Chart Reviewed: Yes  Subjective  Subjective: Pt presented to [facility] ED on 6/21 from nursing home after he was found to have high fever and tachycardia. Pt was also hypoxic. The patient was admitted for severe sepsis with concerns for pneumonia. The patient typically goes to [facility] for all care but was transported to [facility] as it was closest hospital and due to him being unstable. Per chart review, pt had MBS on 5/15 which was [facility] and a diet of Dysphagia I (Puree) / Thins was recommended (as late as 5/28) for comfort as pt was experiencing mucositis at that time. The pt had PEG placement on 6/18 which he reports was due to concerns for malnourishment which is reasonably consistent with his current treatment regiment and diagnosis. Per the patient, he believes he was made NPO sometime after his transfer to SNF and does not recall speech therapy / dysphagia treatment. Unable to access records from SNF. The patient presents this date with evaluation per MD to [facility] HARESH ELIZABETH ability to swallow for NATALIE procedure\". Behavior/Cognition: Alert; Cooperative;Pleasant mood  Respiratory Status: O2 via nasual cannula  O2 Device: Nasal cannula  Communication Observation: Functional;Dysarthria  Follows Directions: Simple  Dentition: Some missing teeth  Patient Positioning: Upright in chair  Baseline Vocal Quality: Normal  Volitional Swallow: Delayed  Prior Dysphagia History: Oropharyngeal Dysphagia since at least 05/2020 related to [facility] base of tongue cancer. Consistencies Administered: Thin - teaspoon; Thin - cup; Ice Chips;Dysphagia Pureed (Dysphagia I)     Oral Motor Deficits  Oral/Motor  Oral Motor: Exceptions to [facility]  Labial ROM: Reduced left; Reduced right  Labial Symmetry: Abnormal symmetry left  Lingual ROM: Reduced left; Reduced right    Oral Phase Dysfunction  Oral Phase  Oral Phase: WFL  Oral Phase  Oral Phase - Comment: Some oral holding vs difficulty initiating swallow vs. behavioral. No oral residue. No complaints of altered taste at this time. Indicators of Pharyngeal Phase Dysfunction   Pharyngeal Phase  Pharyngeal Phase: Exceptions  Indicators of Pharyngeal Phase Dysfunction  Delayed Swallow: All  Pharyngeal Phase   Pharyngeal: Delayed swallow initiation with puree and thins. The patient had no overt s/s of aspiraiton and no changes in vocal quality. Prognosis  Prognosis  Prognosis for safe diet advancement: fair  Barriers to reach goals: age;motivation;time post onset  Individuals consulted  Consulted and agree with results and recommendations: Patient;RN;MD    Education  Patient Education: Education regarding importance of oral care (pt endorses not completing oral care since NPO status), rationale for BSE and definitition of aspiration / aspiration pneumonia, recommendation for instrumental swallow assessment, results/recommendations, general plan of care. Patient Education Response: Verbalizes understanding;Needs reinforcement  Safety Devices in place: Yes  Type of devices: Call light within reach; Chair alarm in place       Therapy Time  SLP Individual Minutes  Time In: 9393  Time Out: 5754  Minutes: 30  SLP Co-Treatment Minutes  Time In: 0000  Time Out: 0000  Minutes: 0  SLP Total Treatment Time  Timed Code Treatment Minutes: 0 Minutes  Total Treatment Time: 30    Gary Hubbard M.A., CCC-SLP [id number]  Speech-Language Pathologist

## 2020-06-30 ENCOUNTER — APPOINTMENT (OUTPATIENT)
Dept: GENERAL RADIOLOGY | Age: 60
DRG: 871 | End: 2020-06-30
Payer: MEDICARE

## 2020-06-30 LAB
ANION GAP SERPL CALCULATED.3IONS-SCNC: 5 MMOL/L (ref 3–16)
ANTI-XA UNFRAC HEPARIN: 0.59 IU/ML (ref 0.3–0.7)
BASOPHILS ABSOLUTE: 0 K/UL (ref 0–0.2)
BASOPHILS RELATIVE PERCENT: 0.6 %
BUN BLDV-MCNC: 13 MG/DL (ref 7–20)
CALCIUM SERPL-MCNC: 8.5 MG/DL (ref 8.3–10.6)
CHLORIDE BLD-SCNC: 105 MMOL/L (ref 99–110)
CO2: 28 MMOL/L (ref 21–32)
CREAT SERPL-MCNC: 0.5 MG/DL (ref 0.9–1.3)
EOSINOPHILS ABSOLUTE: 0.1 K/UL (ref 0–0.6)
EOSINOPHILS RELATIVE PERCENT: 2 %
GFR AFRICAN AMERICAN: >60
GFR NON-AFRICAN AMERICAN: >60
GLUCOSE BLD-MCNC: 98 MG/DL (ref 70–99)
HCT VFR BLD CALC: 27.7 % (ref 40.5–52.5)
HEMOGLOBIN: 9.2 G/DL (ref 13.5–17.5)
INR BLD: 1.69 (ref 0.86–1.14)
LYMPHOCYTES ABSOLUTE: 0.4 K/UL (ref 1–5.1)
LYMPHOCYTES RELATIVE PERCENT: 8.9 %
MCH RBC QN AUTO: 30.2 PG (ref 26–34)
MCHC RBC AUTO-ENTMCNC: 33 G/DL (ref 31–36)
MCV RBC AUTO: 91.4 FL (ref 80–100)
MONOCYTES ABSOLUTE: 0.3 K/UL (ref 0–1.3)
MONOCYTES RELATIVE PERCENT: 7.5 %
NEUTROPHILS ABSOLUTE: 3.5 K/UL (ref 1.7–7.7)
NEUTROPHILS RELATIVE PERCENT: 81 %
PDW BLD-RTO: 19.7 % (ref 12.4–15.4)
PLATELET # BLD: 96 K/UL (ref 135–450)
PMV BLD AUTO: 8 FL (ref 5–10.5)
POTASSIUM REFLEX MAGNESIUM: 4.4 MMOL/L (ref 3.5–5.1)
PROTHROMBIN TIME: 19.7 SEC (ref 10–13.2)
RBC # BLD: 3.03 M/UL (ref 4.2–5.9)
SARS-COV-2, PCR: NOT DETECTED
SODIUM BLD-SCNC: 138 MMOL/L (ref 136–145)
WBC # BLD: 4.3 K/UL (ref 4–11)

## 2020-06-30 PROCEDURE — 6370000000 HC RX 637 (ALT 250 FOR IP): Performed by: STUDENT IN AN ORGANIZED HEALTH CARE EDUCATION/TRAINING PROGRAM

## 2020-06-30 PROCEDURE — 6360000002 HC RX W HCPCS: Performed by: STUDENT IN AN ORGANIZED HEALTH CARE EDUCATION/TRAINING PROGRAM

## 2020-06-30 PROCEDURE — 85520 HEPARIN ASSAY: CPT

## 2020-06-30 PROCEDURE — 99232 SBSQ HOSP IP/OBS MODERATE 35: CPT | Performed by: INTERNAL MEDICINE

## 2020-06-30 PROCEDURE — 85025 COMPLETE CBC W/AUTO DIFF WBC: CPT

## 2020-06-30 PROCEDURE — 74230 X-RAY XM SWLNG FUNCJ C+: CPT

## 2020-06-30 PROCEDURE — 6360000002 HC RX W HCPCS: Performed by: INTERNAL MEDICINE

## 2020-06-30 PROCEDURE — 85610 PROTHROMBIN TIME: CPT

## 2020-06-30 PROCEDURE — 36415 COLL VENOUS BLD VENIPUNCTURE: CPT

## 2020-06-30 PROCEDURE — 1200000000 HC SEMI PRIVATE

## 2020-06-30 PROCEDURE — 2580000003 HC RX 258: Performed by: INTERNAL MEDICINE

## 2020-06-30 PROCEDURE — 92611 MOTION FLUOROSCOPY/SWALLOW: CPT | Performed by: SPEECH-LANGUAGE PATHOLOGIST

## 2020-06-30 PROCEDURE — 6370000000 HC RX 637 (ALT 250 FOR IP): Performed by: INTERNAL MEDICINE

## 2020-06-30 PROCEDURE — 80048 BASIC METABOLIC PNL TOTAL CA: CPT

## 2020-06-30 RX ORDER — LIDOCAINE HYDROCHLORIDE 10 MG/ML
5 INJECTION, SOLUTION EPIDURAL; INFILTRATION; INTRACAUDAL; PERINEURAL ONCE
Status: COMPLETED | OUTPATIENT
Start: 2020-06-30 | End: 2020-07-01

## 2020-06-30 RX ORDER — SODIUM CHLORIDE 0.9 % (FLUSH) 0.9 %
10 SYRINGE (ML) INJECTION PRN
Status: DISCONTINUED | OUTPATIENT
Start: 2020-06-30 | End: 2020-07-01 | Stop reason: HOSPADM

## 2020-06-30 RX ORDER — SODIUM CHLORIDE 0.9 % (FLUSH) 0.9 %
10 SYRINGE (ML) INJECTION EVERY 12 HOURS SCHEDULED
Status: DISCONTINUED | OUTPATIENT
Start: 2020-06-30 | End: 2020-07-01 | Stop reason: HOSPADM

## 2020-06-30 RX ADMIN — CEFAZOLIN SODIUM 2 G: 1 POWDER, FOR SOLUTION INTRAMUSCULAR; INTRAVENOUS at 10:45

## 2020-06-30 RX ADMIN — DOCUSATE SODIUM 50 MG AND SENNOSIDES 8.6 MG 1 TABLET: 8.6; 5 TABLET, FILM COATED ORAL at 10:45

## 2020-06-30 RX ADMIN — CEFAZOLIN SODIUM 2 G: 1 POWDER, FOR SOLUTION INTRAMUSCULAR; INTRAVENOUS at 18:44

## 2020-06-30 RX ADMIN — TAMSULOSIN HYDROCHLORIDE 0.4 MG: 0.4 CAPSULE ORAL at 10:38

## 2020-06-30 RX ADMIN — ONDANSETRON 4 MG: 2 INJECTION INTRAMUSCULAR; INTRAVENOUS at 09:05

## 2020-06-30 RX ADMIN — ONDANSETRON 4 MG: 2 INJECTION INTRAMUSCULAR; INTRAVENOUS at 16:26

## 2020-06-30 RX ADMIN — Medication 6 MG: at 21:56

## 2020-06-30 RX ADMIN — POLYETHYLENE GLYCOL (3350) 17 G: 17 POWDER, FOR SOLUTION ORAL at 10:45

## 2020-06-30 RX ADMIN — OXYBUTYNIN CHLORIDE 5 MG: 5 TABLET ORAL at 10:38

## 2020-06-30 RX ADMIN — FAMOTIDINE 20 MG: 20 TABLET ORAL at 10:38

## 2020-06-30 RX ADMIN — CEFAZOLIN SODIUM 2 G: 1 POWDER, FOR SOLUTION INTRAMUSCULAR; INTRAVENOUS at 02:55

## 2020-06-30 RX ADMIN — DOCUSATE SODIUM 50 MG AND SENNOSIDES 8.6 MG 1 TABLET: 8.6; 5 TABLET, FILM COATED ORAL at 21:56

## 2020-06-30 RX ADMIN — Medication 10 ML: at 09:05

## 2020-06-30 RX ADMIN — HEPARIN SODIUM 26 UNITS/KG/HR: 10000 INJECTION, SOLUTION INTRAVENOUS at 16:26

## 2020-06-30 RX ADMIN — ATORVASTATIN CALCIUM 40 MG: 40 TABLET, FILM COATED ORAL at 10:38

## 2020-06-30 RX ADMIN — WARFARIN SODIUM 11 MG: 1 TABLET ORAL at 18:43

## 2020-06-30 RX ADMIN — OXYBUTYNIN CHLORIDE 5 MG: 5 TABLET ORAL at 21:56

## 2020-06-30 RX ADMIN — SERTRALINE HYDROCHLORIDE 100 MG: 100 TABLET ORAL at 10:38

## 2020-06-30 RX ADMIN — FAMOTIDINE 20 MG: 20 TABLET ORAL at 21:56

## 2020-06-30 RX ADMIN — ASPIRIN 81 MG: 81 TABLET, COATED ORAL at 10:38

## 2020-06-30 ASSESSMENT — PAIN SCALES - GENERAL
PAINLEVEL_OUTOF10: 0

## 2020-06-30 NOTE — PROCEDURES
D/W RN regarding placing PICC in AM.  Pt will be off heparin for 2 hours then placed.   Will follow up in AM.

## 2020-06-30 NOTE — FLOWSHEET NOTE
06/30/20 1113   Encounter Summary   Services provided to: Patient   Referral/Consult From: Alicia   Continue Visiting   (6/30/2020, SARITA. )   Complexity of Encounter Moderate   Length of Encounter 15 minutes   Routine   Type Follow up   Assessment Approachable   Intervention Nurtured hope   Outcome Expressed gratitude

## 2020-06-30 NOTE — PROGRESS NOTES
Hospitalist Progress Note      PCP: No primary care provider on file. Date of Admission: 6/21/2020    Chief Complaint: hypotension, fever    Subjective:  Patient seen and examined at the bedside. No complaints at this time. PFHS: reviewed as documented 6/21/2020, no changes    Medications:  Reviewed    Infusion Medications    heparin (porcine) 26 Units/kg/hr (06/29/20 6323)     Scheduled Medications    warfarin  11 mg Oral Daily    potassium chloride  20 mEq Intravenous Once    lidocaine 1 % injection  5 mL Intradermal Once    sodium chloride flush  10 mL Intravenous 2 times per day    ceFAZolin  2 g Intravenous Q8H    famotidine  20 mg Oral BID    oxybutynin  5 mg Oral BID    sodium chloride  20 mL Intravenous Once    sodium chloride  250 mL Intravenous Once    aspirin  81 mg Oral Daily    atorvastatin  40 mg Oral Daily    sertraline  100 mg Oral Daily    tamsulosin  0.4 mg Oral Daily    polyethylene glycol  17 g Oral Daily    sennosides-docusate sodium  1 tablet Oral BID     PRN Meds: perflutren lipid microspheres, sodium chloride flush, potassium chloride, heparin (porcine), heparin (porcine), bisacodyl, dextran 70-hypromellose, magic (miracle) mouthwash, melatonin, traMADol, acetaminophen **OR** acetaminophen, magnesium sulfate, ondansetron      Intake/Output Summary (Last 24 hours) at 6/30/2020 0839  Last data filed at 6/30/2020 0428  Gross per 24 hour   Intake 2520 ml   Output 2390 ml   Net 130 ml         Physical Exam Performed:    /66   Pulse 62   Temp 98.3 °F (36.8 °C) (Oral)   Resp 18   Ht 5' 10\" (1.778 m)   Wt 141 lb 8 oz (64.2 kg)   SpO2 95%   BMI 20.30 kg/m²     General appearance:  No apparent distress, appears stated age  Eyes: Pupils equal, round, reactive to light, conjunctiva/corneas clear  Ears/Nose/Mouth/Throat: No external lesions or scars, hearing intact to voice  Neck: Trachea midline, no masses noted  Respiratory:  Normal respiratory effort.   Clear breath sounds bilaterally. Cardiovascular: Regular rate and rhythm, nl S1/S2 with murmur and mechanical click, no rubs or gallops, no lower extremity edema  Abdomen: Soft, non-tender, non-distended, no hepatosplenomegaly. PEG in place  Musculoskeletal: No cyanosis, clubbing or petechiae, no lower extremity misalignment, asymmetry, or crepitation  Skin: Normal skin color, texture, turgor. No rashes or lesions noted. Psychiatric: Alert and oriented x4, good insight and judgment    Labs:   Recent Labs     06/28/20 0450 06/29/20  0553 06/30/20  0638   WBC 3.2* 4.0 4.3   HGB 9.1* 9.3* 9.2*   HCT 27.2* 27.4* 27.7*   PLT 93* 104* 96*     Recent Labs     06/28/20 0450 06/29/20  0553 06/30/20  0638    140 138   K 4.1 4.3 4.4    106 105   CO2 28 27 28   BUN 12 12 13   CREATININE <0.5* 0.5* 0.5*   CALCIUM 8.5 8.6 8.5     No results for input(s): AST, ALT, BILIDIR, BILITOT, ALKPHOS in the last 72 hours. Recent Labs     06/28/20 0450 06/29/20  0553 06/30/20  0638   INR 1.71* 1.68* 1.69*     No results for input(s): CKTOTAL, TROPONINI in the last 72 hours. Urinalysis:      Lab Results   Component Value Date    NITRU Negative 06/21/2020    WBCUA 3-5 06/21/2020    BACTERIA 4+ 06/21/2020    RBCUA 3-4 06/21/2020    BLOODU MODERATE 06/21/2020    SPECGRAV 1.020 06/21/2020    GLUCOSEU Negative 06/21/2020       Radiology:  CT CHEST ABDOMEN PELVIS WO CONTRAST   Final Result      CHEST:      1.  Small right pleural effusion and multifocal pneumonia in the bilateral lungs with a light lower lobe predominance. 2.  Moderate cardiomegaly. ABDOMEN/PELVIS:      1. Rectal wall thickening representing proctitis. Rectum distended with stool. 2.  Colonic diverticulosis without evidence of diverticulitis. 3.  Cholelithiasis and mild gallbladder distention. 4.  Mild splenomegaly. 5.  Right nephrolithiasis.          XR CHEST PORTABLE   Final Result      Bilateral airspace disease concerning for pneumonia; Viral pneumonia should be considered. Assessment/Plan:    Active Hospital Problems    Diagnosis Date Noted    Staphylococcus aureus bacteremia [R78.81] 06/25/2020    H/O mechanical aortic valve replacement [Z95.2] 06/25/2020    Tongue cancer ([facility] [address] [C02.9] 06/25/2020       Plan:    # Severe sepsis due to multifocal PNA, likely gram positive  -COVID negative  -Repeat cultures growing staph aureus  -Off pressors  -Was on vancomycin, cefepime. On cefazolin now  -PICC line removed and tip to be sent for culture 06/29/30  -NATALIE to rule out endocarditis as an outpatient as patient had radiation therapy couple weeks ago. Discussed with Dr. Shakira Peralta.  -New PICC line to be placed  -ID following     # AOC diastolic HF  -Elevated proBNP  -s/p sepsis fluid bolus  -Echo complete     # AOC anemia, thrombocytopenia  -s/p transfusion of 1u pRBC  -appreciate GI input, no evidence of blood loss, likely hemolytic/consumtive process related to acute infection  -hgb stable, 9.3     # NSTEMI  -Likely type 2  -s/p fluids, pressors     # Constipation  -scheduled colace and senna     # h/o stage 4 SCC tongue base  -Cisplatin  -s/p radiation  -Palliative care following     # h/o Mechanical AV in 2000  -heparin gtt as bridge to coumadin  -coumadin      # CAD s/p Stent 1997  # CVA w/ residual L hemiparesis, dysarthria, visual deficit   -Continue ASA     # GERD  -IV protonix     # BPH  -Continue flomax     # Overactive bladder  oxybutynin     # HL  -Continue Lipitor    # Dispo  -Appreciate social work efforts, awaiting placement. COVID Test negative. Discussed with KEDAR    DVT Prophylaxis: heparin gtt, bridge to warfarin  Diet: DIET TUBE FEEDING BOLUS NPO; 1.5 Calorie with Fiber (Jevity 1.5);  Gastrostomy; 1,185; 24  Code Status: Full Code    PT/OT Eval Status: complete    Dispo - In 24-48hrs    Nino Trinidad MD

## 2020-06-30 NOTE — CARE COORDINATION
KEDAR just received a voicemail message from a Carolyn Plummer at [facility] that [employer] has gone to [id number] and our MD can call [phone] option 5 BY NOON TOMORROW. KEDAR will advise Dr. Chelsy Busby.      Flaco Vazquez, [place]  Case [facility]  [phone]

## 2020-06-30 NOTE — PROGRESS NOTES
Speech Language Pathology    Chart reviewed, d/w RN Vanessa. MBS to be attempted later this date; RN to provide transport. Will see pt as available and schedule allows. Addendum  Pt out of isolation precautions; placed on transport list for MBS.       Thank you,    Jolanta La) [place], [place], [address]; [phone]  Speech-Language Pathologist  Pager #: [phone], 12:50pm

## 2020-06-30 NOTE — CARE COORDINATION
Case Management Assessment           Daily Note                 Date/ Time of Note: 6/30/2020 1:51 PM         Note completed by: Dain Pritchard    Patient Name: Calvin Cummings  YOB: 1960    Diagnosis:Severe sepsis ([address] [A41.9, R65.20]  Severe sepsis ([facility] [address] [facility] [A41.9, R65.20]  Patient Admission Status: Inpatient    Date of Admission:6/21/2020  3:01 PM Length of Stay: 9 GLOS:      Current Plan of Care: await ID for new PICC. Started precert for [facility]. Pt and family updated.  ________________________________________________________________________________________  PT AM-PAC: 13 / 24 per last evaluation on:     OT AM-PAC: 16 / 24 per last evaluation on:     DME Needs for discharge: none noted  ________________________________________________________________________________________  Discharge Plan: SNF: [facility]    Tentative discharge date: 7/1/2020    Current barriers to discharge: medical complications    Referrals completed: SNF: [facility]    Resources/ information provided: SNF List  ________________________________________________________________________________________  Case Management Notes: Plan remains for patient to go to SNF. Spoke to patient. He has chosen to go to [facility]. Facility updated and to start precert. precert started via [employer]. Narinder Brar and his family were provided with choice of provider; he and his family are in agreement with the discharge plan.     Care Transition Patient: STACEY Gross, KILOW-S  [facility]  Case Management Department  Ph: [phone]

## 2020-06-30 NOTE — PROGRESS NOTES
Clinical Pharmacy Consult Note    Admit date: 6/21/2020    Interval Update: PICC line removed 6/29, tip sent for culture. Awaiting new PICC, and working on placement. 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 He presented from [facility] SNF with fever (Tmax 102.9F in ED), tachycardia, and hypotension. He was admitted for the treatment of sepsis with IV antibiotics; currently on Ancef for MSSA bacteremia. Warfarin has been on hold for low Hgb and concern for bleeding. GI consulted, but not concerned for active bleeding. Patient is now being restarted on warfarin. Pharmacy is consulted to dose warfarin per Dr. Wendy Jessica anticoagulation regimen:  Warfarin 11 mg daily per medication list from [facility]  · Per chart review, patient's warfarin is managed by [facility]. His most recent INR check was 1.4 on 6/16, but unable to see instructions. He has been on and off enoxaparin (Lovenox). · Per note on 6/10, there has been difficulty keeping patient's INR therapeutic. RN noted that patient had PEG tube placed in the hospital and that tube feeds may have had vitamin K in it which could have caused INR to decrease. Date INR Warfarin Dose   6/25 1.26 11mg   6/26 1.29 11mg   6/27 1.62 11 mg   6/28 1.71 11 mg   6/29 1.68 11 mg + 3mg   6/30 1.69        Recent Labs     06/29/20  0553 06/30/20  0638    138   K 4.3 4.4    105   CO2 27 28   BUN 12 13   CREATININE 0.5* 0.5*   GLUCOSE 90 98       Estimated Creatinine Clearance: 146 mL/min (A) (based on SCr of 0.5 mg/dL (L)).     Lab Results   Component Value Date    WBC 4.3 06/30/2020    HGB 9.2 (L) 06/30/2020    HCT 27.7 (L) 06/30/2020    MCV 91.4 06/30/2020    PLT 96 (L) 06/30/2020       Lab Results Component Value Date    PROTIME 19.7 (H) 06/30/2020    INR 1.69 (H) 06/30/2020       Height:  5' 10\" (177.8 cm)  Weight:  141 lb 8 oz (64.2 kg)    Assessment/Plan:  1. Anticoagulation:  Mechanical Aortic Valve replacement (Goal INR: 2.5-3.5)   · Patient presented with a theraeputic INR of 3.44. · Per chart review, patient's INR has been labile recently and difficult to keep therapeutic. There was speculation that tube feeds recently started may have had vitamin K which caused INR to decrease. · His most recent INR check prior to admission per chart review was 1.26 on 6/16   · INR today = 1.69 (subtherapeutic) - essentially unchanged from yesterday. · Patient received 14mg yesterday, and will continue 11mg daily today. If INR unchanged tomorrow, may need to boost weekly dose. · Patient is currently on a heparin gtt for bridging, held this AM for PICC line placement. Recommend to continue this until INR is > 2.5 and stable. · Medication profile reviewed for potential drug interactions. No significant drug interactions with warfarin noted. · Daily INR will be monitored and dose adjustments made as needed. Discharge recommendation: Continue warfarin 11mg daily. Bridge with Lovenox 60mg SQ BID until INR > 2.5. Repeat INR later this week, Friday at the latest.        Please call with any questions.   Iesha Steel PharmD., BCPS   6/30/2020 12:21 PM  Wireless: [phone]

## 2020-06-30 NOTE — PROGRESS NOTES
effusion and multifocal pneumonia in the bilateral lungs with a light lower lobe predominance. 2.  Moderate cardiomegaly.       ABDOMEN/PELVIS:       1.  Rectal wall thickening representing proctitis. Rectum distended with stool. 2.  Colonic diverticulosis without evidence of diverticulitis. 3.  Cholelithiasis and mild gallbladder distention. 4.  Mild splenomegaly. 5.  Right nephrolithiasis.        Scheduled Meds:   warfarin  11 mg Oral Daily    potassium chloride  20 mEq Intravenous Once    lidocaine 1 % injection  5 mL Intradermal Once    sodium chloride flush  10 mL Intravenous 2 times per day    ceFAZolin  2 g Intravenous Q8H    famotidine  20 mg Oral BID    oxybutynin  5 mg Oral BID    sodium chloride  20 mL Intravenous Once    sodium chloride  250 mL Intravenous Once    aspirin  81 mg Oral Daily    atorvastatin  40 mg Oral Daily    sertraline  100 mg Oral Daily    tamsulosin  0.4 mg Oral Daily    polyethylene glycol  17 g Oral Daily    sennosides-docusate sodium  1 tablet Oral BID       Continuous Infusions:   heparin (porcine) 26 Units/kg/hr (06/29/20 5078)       PRN Meds:  perflutren lipid microspheres, sodium chloride flush, potassium chloride, heparin (porcine), heparin (porcine), bisacodyl, dextran 70-hypromellose, magic (miracle) mouthwash, melatonin, traMADol, acetaminophen **OR** acetaminophen, magnesium sulfate, ondansetron      Assessment:     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  Hx CVA (L hemiparesis), CAD, HTN  Hx mechanical AoVR on coumadin  Debilitated, has G-tube, lives in facility ([facility])  Had PICC in place on presentation     Presents from [facility] with fever and hypotension  MSSA bacteremia - PICC poss source (never removed), f/u [facility] 6/22 neg, TTE neg; r/o PVE (prosthetic v endocarditis)    Plan:     Cont ancef  NATALIE - not urgent, can do as outpt with anaesthesia given XRT (discussed with Dr Srini Padgett)  Treat for at least 4 weeks, may be longer /

## 2020-06-30 NOTE — PROCEDURES
INSTRUMENTAL SWALLOW REPORT  MODIFIED BARIUM SWALLOW    NAME: Jose Mcclelland   : 1960  MRN: [id number]       Date of Eval: 2020     Ordering Physician: Dr. Dilshad Marroquin  Radiologist: Dr. Augustin Dietrich     Referring Diagnosis(es): Referring Diagnosis: BOT CA    Past Medical History:  has a past medical history of Adult failure to thrive, Dysphagia, unspecified, Encounter for attention to gastrostomy [facility]), GERD without esophagitis, Hyperlipidemia, Hypertension, Legal blindness, as defined in [place], Long term (current) use of anticoagulants, Malignant neoplasm of oropharynx, unspecified ([address] Muscle weakness (generalized), Overactive bladder, Presence of prosthetic heart valve, Secondary and unspecified malignant neoplasm of lymph nodes of head, face and neck ([address] Unspecified severe protein-calorie malnutrition ([facility] [address] and Unsteadiness on feet. Past Surgical History:  has no past surgical history on file. Current Diet Solid Consistency: NPO  Current Diet Liquid Consistency: NPO    Date of Prior Study: 5/15/20  Type of Study: Repeat MBS  Results of Prior Study: Puree + thin liquids d/t odynophagia    Recent CXR/CT of Chest: Date 20  Impression       CHEST:       1.  Small right pleural effusion and multifocal pneumonia in the bilateral lungs with a light lower lobe predominance. 2.  Moderate cardiomegaly. Patient Complaints/Reason for Referral:  Jose Mcclelland was referred for a MBS to assess the efficiency of his/her swallow function, assess for aspiration, and to make recommendations regarding safe dietary consistencies, effective compensatory strategies, and safe eating environment. Patient complaints: Taste changes    Onset of problem:   Date of Onset: May 2020    General Comment  Comments: Pt presented to [facility] ED on  from nursing home after he was found to have high fever and tachycardia. Pt was also hypoxic. The patient was admitted for severe sepsis with concerns for pneumonia.  The generalized weakness from CXRT and location of CA. No oral residue and adequate, timely mastication. Pt does have L facial droop at baseline d/t previous CVA. No difficulties w/ swallow initiation this date however, pt did endorse dislike for barium and noted facial grimacing. Pharyngeal: Pharyngeal phase deficits characterized by reduced epiglottic inversion d/t reduced BOT retraction, mildly reduced pharyngeal constriction and hyolaryngeal mechanics, and diffuse pharyngeal residue secondary to generalized weakness and non-use of swallowing mechanism. Pt w/ x1 episode of trace, silent aspiration after large cup sip; trialed w/ effortful swallow and no aspiration. No other instances of penetration or aspiration w/ any consistencies, including thin liquids w/ cup and straw. Mild valleculae, pyriform, and PPW residue w/ all consistencies; cleared w/ secondary swallow. No build-up of residue. Dysphagia Outcome Severity Scale: Level 5: Mild dysphagia- Distant supervision. May need one diet consistency restricted  Penetration-Aspiration Scale (PAS): 7 - Material enters the airway, passes below the vocal folds, and is not ejected from the trachea despite effort    Recommended Diet:  Solid consistency: Regular (to allow pt to chose foods that are \"easy\" and satisfy taste changes)  Liquid consistency: Thin  Liquid administration via: Cup;Straw    Medication administration: Meds in puree    Safe Swallow Protocol:  Supervision: Independent  Compensatory Swallowing Strategies: Alternate solids and liquids;Small bites/sips;Effortful swallow;Upright as possible for all oral intake      Recommendations/Treatment  Requires SLP Intervention: Yes        D/C Recommendations: Home with intermittent assistance  Postural Changes and/or Swallow Maneuvers: Upright 90 degrees      Recommended Exercises:    Therapeutic Interventions: Laryngeal exercises; Pharyngeal exercises;Effortful swallow; Tongue base strengthening;Diet tolerance monitoring    Referral To: Dysphagia evaluation    Education: Images and recommendations were reviewed with pt and RN following this exam.   Patient Education: Educated pt regarding results of MBS, diet recommendation, ongoing ST, and alternative nutrition. Patient Education Response: Verbalizes understanding;Needs reinforcement    Prognosis  Prognosis for safe diet advancement: good  Barriers to reach goals: time post onset;other (comment)  Barriers/Prognosis Comment: Known long-term effects of XRT (radiation fibrosis)  Duration/Frequency of Treatment  Duration/Frequency of Treatment: 1-3x per week for length of stay       Goals:    Dysphagia Goals: The patient/caregiver will demonstrate understanding of compensatory strategies for improved swallowing safety. ;Other (comment)(The patient will complete prophylactic swallow exercises to maintain swallow physiology)      Oral Preparation / Oral Phase  Oral Phase: Impaired  Oral Phase - Major Contributing Deficits  Premature Bolus Loss to Pharynx: All  Reduced Tongue Base Retraction: All    Pharyngeal Phase  Pharyngeal Phase: Impaired  Pharyngeal Phase - Major Contributing Deficits  Premature Spillage to Valleculae: All  Reduced Epiglottic Distention: All  Reduced Tongue Base: All  Aspiration During: Thin cup  No Cough Reflex: Thin cup  Pharyngeal Residue - Valleculae:  All  Pharyngeal Residue - Pyriform: All    Esophageal Phase  Esophageal Screen: WFL    Pain   Patient Currently in Pain: Denies  Pain Level: 0  Pain Type: Chronic pain  Pain Location: Throat, Neck      Therapy Time:   Individual Concurrent Group Co-treatment   Time In 1445         Time Out 1515         Minutes 30            Timed Code Treatment Minutes: 0 Minutes  Total Treatment Time: 27    Thank you,    Mika Helm) Donny Peterson, [address] [place] [address] Nanette Fortune; [phone]  Speech-Language Pathologist

## 2020-07-01 VITALS
TEMPERATURE: 98.3 F | SYSTOLIC BLOOD PRESSURE: 103 MMHG | HEART RATE: 62 BPM | RESPIRATION RATE: 18 BRPM | BODY MASS INDEX: 19.69 KG/M2 | OXYGEN SATURATION: 94 % | HEIGHT: 70 IN | DIASTOLIC BLOOD PRESSURE: 57 MMHG | WEIGHT: 137.57 LBS

## 2020-07-01 LAB
ANION GAP SERPL CALCULATED.3IONS-SCNC: 7 MMOL/L (ref 3–16)
ANTI-XA UNFRAC HEPARIN: 0.57 IU/ML (ref 0.3–0.7)
BASOPHILS ABSOLUTE: 0 K/UL (ref 0–0.2)
BASOPHILS RELATIVE PERCENT: 0.4 %
BUN BLDV-MCNC: 13 MG/DL (ref 7–20)
CALCIUM SERPL-MCNC: 8.9 MG/DL (ref 8.3–10.6)
CHLORIDE BLD-SCNC: 103 MMOL/L (ref 99–110)
CO2: 28 MMOL/L (ref 21–32)
CREAT SERPL-MCNC: 0.6 MG/DL (ref 0.9–1.3)
EOSINOPHILS ABSOLUTE: 0.1 K/UL (ref 0–0.6)
EOSINOPHILS RELATIVE PERCENT: 2.8 %
GFR AFRICAN AMERICAN: >60
GFR NON-AFRICAN AMERICAN: >60
GLUCOSE BLD-MCNC: 152 MG/DL (ref 70–99)
HCT VFR BLD CALC: 27.4 % (ref 40.5–52.5)
HEMOGLOBIN: 9.3 G/DL (ref 13.5–17.5)
INR BLD: 1.61 (ref 0.86–1.14)
LYMPHOCYTES ABSOLUTE: 0.3 K/UL (ref 1–5.1)
LYMPHOCYTES RELATIVE PERCENT: 8 %
MCH RBC QN AUTO: 30.7 PG (ref 26–34)
MCHC RBC AUTO-ENTMCNC: 33.9 G/DL (ref 31–36)
MCV RBC AUTO: 90.7 FL (ref 80–100)
MONOCYTES ABSOLUTE: 0.2 K/UL (ref 0–1.3)
MONOCYTES RELATIVE PERCENT: 5.8 %
NEUTROPHILS ABSOLUTE: 3.3 K/UL (ref 1.7–7.7)
NEUTROPHILS RELATIVE PERCENT: 83 %
PDW BLD-RTO: 20.1 % (ref 12.4–15.4)
PLATELET # BLD: 100 K/UL (ref 135–450)
PMV BLD AUTO: 8.4 FL (ref 5–10.5)
POTASSIUM REFLEX MAGNESIUM: 4.6 MMOL/L (ref 3.5–5.1)
PROTHROMBIN TIME: 18.8 SEC (ref 10–13.2)
RBC # BLD: 3.02 M/UL (ref 4.2–5.9)
SODIUM BLD-SCNC: 138 MMOL/L (ref 136–145)
WBC # BLD: 4 K/UL (ref 4–11)

## 2020-07-01 PROCEDURE — 94761 N-INVAS EAR/PLS OXIMETRY MLT: CPT

## 2020-07-01 PROCEDURE — 85520 HEPARIN ASSAY: CPT

## 2020-07-01 PROCEDURE — 6360000002 HC RX W HCPCS: Performed by: INTERNAL MEDICINE

## 2020-07-01 PROCEDURE — 80048 BASIC METABOLIC PNL TOTAL CA: CPT

## 2020-07-01 PROCEDURE — 36415 COLL VENOUS BLD VENIPUNCTURE: CPT

## 2020-07-01 PROCEDURE — 2500000003 HC RX 250 WO HCPCS: Performed by: INTERNAL MEDICINE

## 2020-07-01 PROCEDURE — 6370000000 HC RX 637 (ALT 250 FOR IP): Performed by: STUDENT IN AN ORGANIZED HEALTH CARE EDUCATION/TRAINING PROGRAM

## 2020-07-01 PROCEDURE — 99232 SBSQ HOSP IP/OBS MODERATE 35: CPT | Performed by: INTERNAL MEDICINE

## 2020-07-01 PROCEDURE — 92526 ORAL FUNCTION THERAPY: CPT | Performed by: SPEECH-LANGUAGE PATHOLOGIST

## 2020-07-01 PROCEDURE — 36569 INSJ PICC 5 YR+ W/O IMAGING: CPT

## 2020-07-01 PROCEDURE — 2580000003 HC RX 258: Performed by: INTERNAL MEDICINE

## 2020-07-01 PROCEDURE — 85610 PROTHROMBIN TIME: CPT

## 2020-07-01 PROCEDURE — 85025 COMPLETE CBC W/AUTO DIFF WBC: CPT

## 2020-07-01 PROCEDURE — 6370000000 HC RX 637 (ALT 250 FOR IP): Performed by: INTERNAL MEDICINE

## 2020-07-01 PROCEDURE — C1751 CATH, INF, PER/CENT/MIDLINE: HCPCS

## 2020-07-01 PROCEDURE — 6360000002 HC RX W HCPCS: Performed by: STUDENT IN AN ORGANIZED HEALTH CARE EDUCATION/TRAINING PROGRAM

## 2020-07-01 PROCEDURE — 2700000000 HC OXYGEN THERAPY PER DAY

## 2020-07-01 RX ADMIN — HEPARIN SODIUM 26 UNITS/KG/HR: 10000 INJECTION, SOLUTION INTRAVENOUS at 14:57

## 2020-07-01 RX ADMIN — TAMSULOSIN HYDROCHLORIDE 0.4 MG: 0.4 CAPSULE ORAL at 08:50

## 2020-07-01 RX ADMIN — CEFAZOLIN SODIUM 2 G: 1 POWDER, FOR SOLUTION INTRAMUSCULAR; INTRAVENOUS at 14:57

## 2020-07-01 RX ADMIN — SERTRALINE HYDROCHLORIDE 100 MG: 100 TABLET ORAL at 08:50

## 2020-07-01 RX ADMIN — POLYETHYLENE GLYCOL (3350) 17 G: 17 POWDER, FOR SOLUTION ORAL at 08:50

## 2020-07-01 RX ADMIN — Medication 10 ML: at 08:49

## 2020-07-01 RX ADMIN — DOCUSATE SODIUM 50 MG AND SENNOSIDES 8.6 MG 1 TABLET: 8.6; 5 TABLET, FILM COATED ORAL at 08:50

## 2020-07-01 RX ADMIN — FAMOTIDINE 20 MG: 20 TABLET ORAL at 08:49

## 2020-07-01 RX ADMIN — WARFARIN SODIUM 14 MG: 2 TABLET ORAL at 18:03

## 2020-07-01 RX ADMIN — ASPIRIN 81 MG: 81 TABLET, COATED ORAL at 08:49

## 2020-07-01 RX ADMIN — ONDANSETRON 4 MG: 2 INJECTION INTRAMUSCULAR; INTRAVENOUS at 14:55

## 2020-07-01 RX ADMIN — LIDOCAINE HYDROCHLORIDE 5 ML: 10 INJECTION, SOLUTION EPIDURAL; INFILTRATION; INTRACAUDAL; PERINEURAL at 11:03

## 2020-07-01 RX ADMIN — ATORVASTATIN CALCIUM 40 MG: 40 TABLET, FILM COATED ORAL at 08:49

## 2020-07-01 RX ADMIN — CEFAZOLIN SODIUM 2 G: 1 POWDER, FOR SOLUTION INTRAMUSCULAR; INTRAVENOUS at 03:22

## 2020-07-01 RX ADMIN — OXYBUTYNIN CHLORIDE 5 MG: 5 TABLET ORAL at 08:50

## 2020-07-01 ASSESSMENT — PAIN SCALES - GENERAL: PAINLEVEL_OUTOF10: 0

## 2020-07-01 NOTE — PROGRESS NOTES
wall thickening representing proctitis. Rectum distended with stool. 2.  Colonic diverticulosis without evidence of diverticulitis. 3.  Cholelithiasis and mild gallbladder distention. 4.  Mild splenomegaly. 5.  Right nephrolithiasis.        Scheduled Meds:   [START ON 7/2/2020] warfarin  11 mg Oral Daily    warfarin  14 mg Oral Once    sodium chloride flush  10 mL Intravenous 2 times per day    potassium chloride  20 mEq Intravenous Once    lidocaine 1 % injection  5 mL Intradermal Once    sodium chloride flush  10 mL Intravenous 2 times per day    ceFAZolin  2 g Intravenous Q8H    famotidine  20 mg Oral BID    oxybutynin  5 mg Oral BID    sodium chloride  20 mL Intravenous Once    sodium chloride  250 mL Intravenous Once    aspirin  81 mg Oral Daily    atorvastatin  40 mg Oral Daily    sertraline  100 mg Oral Daily    tamsulosin  0.4 mg Oral Daily    polyethylene glycol  17 g Oral Daily    sennosides-docusate sodium  1 tablet Oral BID       Continuous Infusions:   heparin (porcine) 26 Units/kg/hr (07/01/20 1457)       PRN Meds:  sodium chloride flush, perflutren lipid microspheres, sodium chloride flush, potassium chloride, heparin (porcine), heparin (porcine), bisacodyl, dextran 70-hypromellose, magic (miracle) mouthwash, melatonin, traMADol, acetaminophen **OR** acetaminophen, magnesium sulfate, ondansetron      Assessment:     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  Hx CVA (L hemiparesis), CAD, HTN  Hx mechanical AoVR on coumadin  Debilitated, has G-tube, lives in facility ([facility])  Had PICC in place on presentation     Presents from [facility] with fever and hypotension  MSSA bacteremia - PICC poss source (never removed), f/u [facility] 6/22 neg, TTE neg; r/o PVE (prosthetic v endocarditis)    Plan:     Cont ancef  NATALIE - not urgent, can do as outpt with anaesthesia given XRT (discussed with Dr Gloria Vazquez)  Treat for at least 4 weeks, may be longer / add gent / rif if NATALIE positive     See ROSE    Discussed with pt, RN  Bartolome Mims MD    INFUSION ORDERS:  Ancef 2 gm iv q 8 hr through 7/20/20  - First dose given in hospital  - R PICC  - 7/1/20  - Disposition / date discharge -   - Check CBC w diff, CMP, ESR, CRP every Mon or Tue - FAX result to [phone]  - Call with antibiotic / infusion issues, [phone]  - Call any change in status, transfer out of facility or to hospital - [phone]  - No f/u in outpatient ID office necessary

## 2020-07-01 NOTE — PROGRESS NOTES
VSS - afebrile. Pt is alert and oriented x 4 with no history of falls. Assessment completed as charted. Bed is in lowest position with 2/4 bed rails raised, bed alarm turned on, wheels locked and call light within reach - patient wearing non-skid socks and verbalizes understanding to call out for assistance. No further requests at this time. Will continue to monitor.      Vitals:    06/30/20 1952   BP: 120/66   Pulse: 60   Resp: 18   Temp: 97.9 °F (36.6 °C)   SpO2: 97%

## 2020-07-01 NOTE — PLAN OF CARE
Problem: Falls - Risk of:  Goal: Will remain free from falls  Description: Will remain free from falls  Outcome: Ongoing  Note: Patient at risk for falls. Patient resting quietly in bed, A/O x 4. Side rails up x 2. Bed locked in lowest position. Bed alarm on. Bedside table and call light within reach. Patient instructed to call for assistance. Patient verbalized understanding. Will continue to monitor.        Problem: Pain:  Goal: Control of chronic pain  Description: Control of chronic pain  Outcome: Ongoing     Problem: Fluid Volume - Imbalance:  Goal: Absence of imbalanced fluid volume signs and symptoms  Description: Absence of imbalanced fluid volume signs and symptoms  Outcome: Ongoing

## 2020-07-01 NOTE — PROGRESS NOTES
NATALIE consult  Will defer NATALIE at this time given active radiation and the risk of esophageal damage. Ric Dee MD, [facility], [facility]

## 2020-07-01 NOTE — PROGRESS NOTES
evaluation per MD to [facility] HARESH ELIZABETH ability to swallow for NATALIE procedure\". Oral [facility] exam pertinent for residue left sided weakness (s/p CVA 1999) and generally reduced ROM. Some oral holding vs difficulty initiating swallow vs. behavioral. No oral residue. No complaints of altered taste at this time. Delayed swallow initiation with puree and thins. The patient had no overt s/s of aspiraiton and no changes in vocal quality. The patient was able to complete cued effortful swallows following each bolus with increased time. Recommendation is for ice chips / thin water with aggressive oral care at this time until MBS can be completed. Recommend instrumental swallow assessment prior to resuming PO diet given history. PerfectServe sent to MD.    MBS results (6/30/20)-  Oral Phase: Oral phase deficits characterized by premature spillage to level of valleculae w/ all consistencies and reduced BOT retraction, secondary to generalized weakness from CXRT and location of CA. No oral residue and adequate, timely mastication. Pt does have L facial droop at baseline d/t previous CVA. No difficulties w/ swallow initiation this date however, pt did endorse dislike for barium and noted facial grimacing. Pharyngeal: Pharyngeal phase deficits characterized by reduced epiglottic inversion d/t reduced BOT retraction, mildly reduced pharyngeal constriction and hyolaryngeal mechanics, and diffuse pharyngeal residue secondary to generalized weakness and non-use of swallowing mechanism. Pt w/ x1 episode of trace, silent aspiration after large cup sip; trialed w/ effortful swallow and no aspiration. No other instances of penetration or aspiration w/ any consistencies, including thin liquids w/ cup and straw. Mild valleculae, pyriform, and PPW residue w/ all consistencies; cleared w/ secondary swallow. No build-up of residue.     Pain: Verbally denied    Current Diet : Regular + Thin; tube feeds    Treatment:  Pt seen bedside to address the following goals:  1- The patient/caregiver will demonstrate understanding of compensatory strategies for improved swallowing safety. 7/1: Reviewed results of MBS, including diet recommendation and compensations. Pt endorsed understanding of use of liquid wash and sequential swallow to assist w/ clearance of residue. Reported eating only puree consistencies last night. Encouraged to increase textures/consistencies to promote swallow physiology; pt endorsed understanding. Educated re: increasing PO to decrease tube feeds. Cont goal.    2- The patient will complete prophylactic swallow exercises to maintain swallow physiology  7/1: Introduced to prophylactic exercises consisting of effortful swallow, Kita, and CTAR. Pt demonstrated all three exercises x10. Noted to have dry heaving post-effortful swallow w/ small sips of water; endorsed general nausea in the morning and typically takes Zofran prior to any PO/tube feeds. Noted to have immediate cough x1; no other s/s of aspiration. Exercises written on white board; independent use of board to recall exercises and demonstrate w/o additional cues. Encouraged to complete each x20 daily. Cont goal.       Patient/Family/Caregiver Education:  See the above. Compensatory Strategies:  Upright during all meals  Oral care  Small sips/bites  Liquid wash  Sequential swallow       Plan:  Continued daily Dysphagia treatment with goals per plan of care. Diet recommendations: Regular + Thin (single sips)  OK recommendation: Recommend ongoing ST at [facility] vs Rehab)  Treatment: 13  D/W nursing, Robert Hutchison  Needs met prior to leaving room, call button in reach. Thank you,    Yakelin Ceron) [place], [place], [address] [facility]; [phone]  Speech-Language Pathologist  Pg.  # [phone]    If patient is discharged prior to next treatment, this note will serve as the discharge summary

## 2020-07-01 NOTE — CARE COORDINATION
KEDAR sent a PerfectServe to Dr. Cris Tesfaye with [email or web address] information that needs to be done today by Noon. Odean Morning, \[facility]\""  Case Management  [phone]    [id number]-Addendum  KEDAR spoke with Dr. Cris Tesfaye re: she called for [id number] and was told Pt's Precert has been approved for SNF.      JONATAN Clark

## 2020-07-01 NOTE — CARE COORDINATION
Patient has been approved for admit to [facility]. Patient is awaiting PICC line insertion before being discharged to [facility] today. Will have transport by [facility] at 6:00 pm today. Patient, in mean time, awaiting PICC line placement. Nurse Report [phone] (ask for [address])  Fax [phone]  Contacted facility and patient's [facility] nurse to let know what time of transport. Faxed discharge packet to facility.  Electronically signed by Giuseppe Shipman RN on 7/1/2020 at 5:19 PM

## 2020-07-01 NOTE — DISCHARGE SUMMARY
Hospital Medicine Discharge Summary    Patient ID: Quincy Robles      Patient's PCP: No primary care provider on file. Admit Date: 6/21/2020     Discharge Date:   07/01/20    Admitting Physician: Maria Elena Ferguson MD     Discharge Physician: Harlan Pereira MD     Discharge Diagnoses: Active Hospital Problems    Diagnosis Date Noted    Staphylococcus aureus bacteremia [R78.81] 06/25/2020    H/O mechanical aortic valve replacement [Z95.2] 06/25/2020    Tongue cancer ([address] [C02.9] 06/25/2020       The patient was seen and examined on day of discharge and this discharge summary is in conjunction with any daily progress note from day of discharge. Hospital Course:  Patient was admitted and treated for following:    # Severe sepsis due to multifocal PNA/MSSA bacteremia:  Cultures were sent. Patient was started on IV antibiotics and pressors. Cover test was sent. Cover test came back negative. ID was consulted. Repeat culture grew staph aureus. Patient was transitioned from vancomycin and cefepime to cefazolin. PICC line was removed and tip sent for culture on 06/29/1930. NATALIE to rule out endocarditis was ordered but plan to get it as an outpatient as patient had radiation therapy couple weeks ago. New PICC line placed. Patient to continue IV antibiotics till 7/20/20     #Chronic diastolic HF  Patient had elevated proBNP. Received fluids per sepsis protocol. Echo showed an EF of 50 to 55% with elevated RA pressure. No diuresis was done. Acute heart failure ruled out. proBNP was likely elevated due to sepsis with a component of chronic diastolic heart failure.     # AOC anemia, thrombocytopenia  Patient was given 1 unit of packed red blood cell transfusion. FOBT was positive. GI was consulted. H&H was monitored.  GI suggested that anemia was likely sec to hemolytic/consumtive process related to acute infection given low haptoglobin, increase LDH, increased AST, elevated bilirubin and dropping platelets. Hemoglobin and platelets remained stable.     # NSTEMI  Type II. Status post fluids and pressors.     # CAD s/p Stent 1997  # CVA w/ residual L hemiparesis, dysarthria, visual deficit   Aspirin was continued    # h/o Mechanical AV in 2000  Heparin drip was started as bridge to Coumadin. Patient INR was monitored. INR remains subtherapeutic. Patient to continue Lovenox twice daily until INR therapeutic. And continue Coumadin on discharge. # h/o stage 4 SCC tongue base  Patient on cisplatin and getting radiation therapy. Last radiation therapy was 2 weeks ago. Palliative care was consulted and followed the patient     # HLD  Lipitor was continued    # GERD  Protonix was continued     # BPH  Flomax was continued    # Overactive bladder  Oxybutynin was continued    # Constipation  Colace and senna was continued    Physical Exam Performed:     /60   Pulse 68   Temp 97.8 °F (36.6 °C) (Oral)   Resp 18   Ht 5' 10\" (1.778 m)   Wt 137 lb 9.1 oz (62.4 kg)   SpO2 96%   BMI 19.74 kg/m²     General appearance:  No apparent distress, appears stated age  Eyes: Pupils equal, round, reactive to light, conjunctiva/corneas clear  Ears/Nose/Mouth/Throat: No external lesions or scars, hearing intact to voice  Neck: Trachea midline, no masses noted  Respiratory:  Normal respiratory effort. Clear breath sounds bilaterally. Cardiovascular: Regular rate and rhythm, nl S1/S2 with murmur and mechanical click, no rubs or gallops, no lower extremity edema  Abdomen: Soft, non-tender, non-distended, no hepatosplenomegaly. PEG in place  Musculoskeletal: No cyanosis, clubbing or petechiae, no lower extremity misalignment, asymmetry, or crepitation  Skin: Normal skin color, texture, turgor. No rashes or lesions noted. Neuro/Psychiatric: Alert and oriented x4, good insight and judgment    Labs:  For convenience and continuity at follow-up the following most recent labs are provided:      CBC:    Lab Results   Component Value Date    WBC 4.0 07/01/2020    HGB 9.3 07/01/2020    HCT 27.4 07/01/2020     07/01/2020       Renal:    Lab Results   Component Value Date     07/01/2020    K 4.6 07/01/2020     07/01/2020    CO2 28 07/01/2020    BUN 13 07/01/2020    CREATININE 0.6 07/01/2020    CALCIUM 8.9 07/01/2020         Significant Diagnostic Studies    Radiology:   CT CHEST ABDOMEN PELVIS WO CONTRAST   Final Result      CHEST:      1.  Small right pleural effusion and multifocal pneumonia in the bilateral lungs with a light lower lobe predominance. 2.  Moderate cardiomegaly. ABDOMEN/PELVIS:      1. Rectal wall thickening representing proctitis. Rectum distended with stool. 2.  Colonic diverticulosis without evidence of diverticulitis. 3.  Cholelithiasis and mild gallbladder distention. 4.  Mild splenomegaly. 5.  Right nephrolithiasis. XR CHEST PORTABLE   Final Result      Bilateral airspace disease concerning for pneumonia; Viral pneumonia should be considered. FL MODIFIED BARIUM SWALLOW W VIDEO    (Results Pending)          Consults:     IP CONSULT TO CRITICAL CARE  IP CONSULT TO HOSPITALIST  IP CONSULT TO PHARMACY  IP CONSULT TO DIETITIAN  IP CONSULT TO CRITICAL CARE  IP CONSULT TO CRITICAL CARE  IP CONSULT TO PALLIATIVE CARE  PHARMACY TO DOSE WARFARIN  IP CONSULT TO GI  IP CONSULT TO INFECTIOUS DISEASES  PHARMACY TO DOSE WARFARIN  IP CONSULT TO CARDIOLOGY    Disposition: Skilled nursing facility    Condition at Discharge: Stable    Discharge Instructions/Follow-up:    -Follow up with PCP in 1 week for hospital follow up and to order transesophageal echo  -continue AB as ordered  -Continue lovenox BID until warfarin is therapeutic. Code Status:  Full Code     Activity: activity as tolerated    Diet: DIET TUBE FEEDING BOLUS NPO; 1.5 Calorie with Fiber (Jevity 1.5);  Gastrostomy; 1,185; 24      Discharge Medications:     Current Discharge Medication List Details   enoxaparin (LOVENOX) 100 MG/ML injection Inject 0.6 mLs into the skin 2 times daily Until INR reaches 2.0 and then discontinue order  Qty: 30 mL, Refills: 3              Details   acetaminophen (TYLENOL) 325 MG tablet Take 650 mg by mouth every 6 hours as needed for Pain      aspirin 81 MG chewable tablet Take 81 mg by mouth daily      Mouthwashes (BIOTENE) LIQD oral solution Swish and spit 15 mLs 3 times daily as needed      vitamin D 25 MCG (1000 UT) CAPS Take 1 capsule by mouth daily      docusate (COLACE) 50 MG/5ML liquid Take 50 mg by mouth 2 times daily as needed (constipation)      Promethazine HCl 6.25 MG/5ML SOLN Take 25 mg by mouth every 6 hours as needed (nausea)      senna (SENOKOT) 8.6 MG tablet Take 2 tablets by mouth as needed for Constipation      Multiple Vitamins-Minerals (THERAPEUTIC MULTIVITAMIN-MINERALS) tablet Take 1 tablet by mouth daily      carboxymethylcellulose (REFRESH TEARS) 0.5 % SOLN ophthalmic solution Place 2 drops into both eyes every 6 hours as needed (dry eyes)       atenolol (TENORMIN) 25 MG tablet TAKE 1 TABLET EVERY DAY      atorvastatin (LIPITOR) 40 MG tablet Take 40 mg by mouth daily      ondansetron (ZOFRAN) 4 MG/5ML solution 8 mg by Per NG tube route every 8 hours as needed       oxybutynin (DITROPAN-XL) 10 MG extended release tablet Take 10 mg by mouth daily      pantoprazole (PROTONIX) 40 MG tablet Take 40 mg by mouth daily      sertraline (ZOLOFT) 100 MG tablet TAKE 1 TABLET EVERY DAY      tamsulosin (FLOMAX) 0.4 MG capsule TAKE 1 CAPSULE BY MOUTH NIGHTLY AT BEDTIME.      traMADol (ULTRAM) 50 MG tablet Take 50 mg by mouth every 6 hours as needed for Pain.       warfarin (COUMADIN) 1 MG tablet Take 11 mg by mouth daily       melatonin 3 MG TABS tablet Take 3 mg by mouth nightly as needed             Time Spent on discharge is more than 30 minutes in the examination, evaluation, counseling and review of medications and discharge plan.       Signed:    Julian Vogt Paula Branch MD   7/1/2020

## 2020-07-01 NOTE — PROCEDURES
Small scab slight red area from prior PICC, upon ultrasound assessment appears to be brachial vein used and not able to compress well. Basilic vein only other vessel to attempt see site rite picture. Note below. PICC   PROCEDURE   NOTE  Chart reviewed for allergies, diagnosis, labs, known contraindications, reason for line placement and planned length of treatment. Informed consent noted to be signed and on chart. Insertion procedure discussed with patient/family member. Three patient identifiers - Patient name,   and MRN -  completed &  confirmed verbally. Time out performed Hat, mask and eye shield donned. PICC site scrubbed with Chloraprep  One-Step applicator for 30 seconds x 1. Hand Hygiene  performed with 3% Chlorhexidine surgical scrub x1 min prior to  Sterile gloves, sterile gown being donned. Patient draped using maximal sterile barrier technique ( head to toe ). PICC site scrubbed a 2nd time with Chloraprep One-Step applicator x 30 sec. Modified Seldinger  technique/ultrasound assisted and tip locating system utilized for insertion. 1% Lidocaine 5 ml injected interdermally pre-insertion. PICC tip location in the SVC confirmed by ECG technology Sherlock 3CG. Positive brisk blood return obtained from all lumens  and each lumen flushed with 10 mls  0.9% Sterile Sodium Chloride. All lumens flush easily with no resistance. Valve placed on all lumens followed by Alcohol Swab Caps on end of each. Bio-patch in place. Catheter secured with non-sutured locking device per hospital protocol. Sterile Tegaderm applied over PICC site. Sterile field maintained during procedure. Guide wire and positioning wire accounted for post procedure and disposed of in sharps. Appearance of site is Clean dry and intact without bleeding or edema. All edges of Tegaderm occlusive. Site marked with date and initials of RN placing line. Teaching performed to pt/family and noted in education section. Bed placed in low position post-procedure. Top 2 side rails in up position call button in reach. Pt. Response to procedure tolerated well. RN notified of all of the above. A Single Lumen Power PICC was trimmed at 44 CM and placed  DIAN basilic vein.

## 2020-07-01 NOTE — PROGRESS NOTES
Clinical Pharmacy Consult Note    Admit date: 6/21/2020    Interval Update: Plan to discharge patient to SNF today pending PICC placement. 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 He presented from [facility] with fever (Tmax 102.9F in ED), tachycardia, and hypotension. He was admitted for the treatment of sepsis with IV antibiotics; currently on Ancef for MSSA bacteremia. Warfarin has been on hold for low Hgb and concern for bleeding. GI consulted, but not concerned for active bleeding. Patient is now being restarted on warfarin. Pharmacy is consulted to dose warfarin per Dr. Welford Klinefelter anticoagulation regimen:  Warfarin 11 mg daily per medication list from [facility]  · Per chart review, patient's warfarin is managed by [facility]. His most recent INR check was 1.4 on 6/16, but unable to see instructions. He has been on and off enoxaparin (Lovenox). · Per note on 6/10, there has been difficulty keeping patient's INR therapeutic. RN noted that patient had PEG tube placed in the hospital and that tube feeds may have had vitamin K in it which could have caused INR to decrease. Date INR Warfarin Dose   6/25 1.26 11 mg   6/26 1.29 11 mg   6/27 1.62 11 mg   6/28 1.71 11 mg   6/29 1.68 11 mg + 3mg   6/30 1.69 11 mg   7/1 1.61 11 mg + 3 mg       Recent Labs     06/30/20  0638 07/01/20  0636    138   K 4.4 4.6    103   CO2 28 28   BUN 13 13   CREATININE 0.5* 0.6*   GLUCOSE 98 152*       Estimated Creatinine Clearance: 118 mL/min (A) (based on SCr of 0.6 mg/dL (L)).     Lab Results   Component Value Date    WBC 4.0 07/01/2020    HGB 9.3 (L) 07/01/2020    HCT 27.4 (L) 07/01/2020    MCV 90.7 07/01/2020     (L) 07/01/2020       Lab Results   Component Value Date    PROTIME 18.8 (H) 07/01/2020    INR 1.61 (H) 07/01/2020       Height:  5' 10\" (177.8 cm)  Weight:  137 lb 9.1 oz (62.4 kg)    Assessment/Plan:  1. Anticoagulation:  Mechanical Aortic Valve replacement (Goal INR: 2.5-3.5)   · Patient presented with a theraeputic INR of 3.44. · Per chart review, patient's INR has been labile recently and difficult to keep therapeutic. There was speculation that tube feeds recently started may have had vitamin K which caused INR to decrease. · His most recent INR check prior to admission per chart review was 1.26 on 6/16   · INR today = 1.61 (subtherapeutic) and slightly decreased from yesterday. · Will give another bolus dose of warfarin 14 mg today. · Patient is currently on a heparin gtt for bridging, held this AM for PICC line placement. Recommend to continue this until INR is > 2.5 and stable. · Medication profile reviewed for potential drug interactions. Significant drug interactions with warfarin noted include cefazolin which can increase INR. No empiric dose adjustments recommended. · Daily INR will be monitored and dose adjustments made as needed. Discharge recommendation: Give bolus dose of warfarin 14 mg tonight, and then resume warfarin 11 mg daily. Bridge with Lovenox 60mg SQ BID until INR > 2.5. Repeat INR later this week, Friday at the latest.      Please call with any questions.     Chantel Berrios PharmD, [facility]  Wireless: [phone]  7/1/2020 12:43 PM

## 2020-07-01 NOTE — PROGRESS NOTES
Palliative Care Chart Review  and Check in Note:     NAME:  Liya Ball  Admit Date: 6/21/2020  Hospital Day:  Hospital Day: 11   Current Code status: Full Code    Palliative care is continuing to following Mr. Kelsea Coronel for symptom management,  and goals of care discussion as needed. Patient's chart reviewed today 7/1/20. Pt is awake and pleasant, voice is strong today and pt denies any pain/discomfort. He's set to discharge today. The following are the currently established goals/code status, and Symptom management. Goals of care: Pt hopes to eventually return to [facility] and follow up with his oncologist and rad onc to continue treatment.     Code status: Full Code    Discharge plan: d/c to SNF today    Babita Kothari NP  [id number]  [phone]

## 2020-07-02 LAB — CULTURE CATHETER TIP: NORMAL

## 2020-07-02 NOTE — PROGRESS NOTES
Patient discharged to facility via transportation service. PIV and telemetry removed. Patient discharged with R upper arm PICC in place. Heparin gtt stopped prior to discharge and evening dose of Coumadin given (see eMAR). Report called to receiving RN, all questions answered and callback number given. Patient discharged with AVS and all personal belongings in hand.

## 2020-07-02 NOTE — CARE COORDINATION
HENS submitted-Document ID: [id number].   Electronically signed by Lucía Calderon RN on 7/2/2020 at 10:37 AM

## 2024-10-21 NOTE — PROCEDURES
Alane Fabry is a 64 y.o. male patient. 1. Sepsis, due to unspecified organism, unspecified whether acute organ dysfunction present ([facility] [address]    2. Pneumonia due to organism      Past Medical History:   Diagnosis Date    Adult failure to thrive     Dysphagia, unspecified     Encounter for attention to gastrostomy ([facility] [address]     GERD without esophagitis     Hyperlipidemia     Hypertension     Legal blindness, as defined in [address] term (current) use of anticoagulants     Malignant neoplasm of oropharynx, unspecified (HCC)     Muscle weakness (generalized)     Overactive bladder     Presence of prosthetic heart valve     Secondary and unspecified malignant neoplasm of lymph nodes of head, face and neck (HCC)     Unspecified severe protein-calorie malnutrition (HCC)     Unsteadiness on feet      Blood pressure (!) 128/48, pulse 90, temperature 99.5 °F (37.5 °C), temperature source Core, resp. rate (!) 38, height 5' 10\" (1.778 m), weight 136 lb 11 oz (62 kg), SpO2 94 %. Insert Arterial Line  Date/Time: 6/22/2020 3:07 PM  Performed by: Jules Mitchell MD  Authorized by: Jules Mitchell MD   Consent: Verbal consent obtained. Risks and benefits: risks, benefits and alternatives were discussed  Consent given by: patient  Patient understanding: patient states understanding of the procedure being performed  Patient consent: the patient's understanding of the procedure matches consent given  Procedure consent: procedure consent matches procedure scheduled  Relevant documents: relevant documents present and verified  Test results: test results available and properly labeled  Site marked: the operative site was marked  Imaging studies: imaging studies available  Patient identity confirmed: verbally with patient and arm band  Time out: Immediately prior to procedure a \"time out\" was called to verify the correct patient, procedure, equipment, support staff and site/side marked as required.   Preparation: Patient was Vasc band removed, area cleansed, arm board and christian applied. IV's removed. Discharge instructions given, patient verbalizes understanding. Patient discharged to home.